# Patient Record
Sex: FEMALE | Race: ASIAN | NOT HISPANIC OR LATINO | URBAN - METROPOLITAN AREA
[De-identification: names, ages, dates, MRNs, and addresses within clinical notes are randomized per-mention and may not be internally consistent; named-entity substitution may affect disease eponyms.]

---

## 2022-11-11 ENCOUNTER — INPATIENT (INPATIENT)
Facility: HOSPITAL | Age: 67
LOS: 5 days | Discharge: HOME | End: 2022-11-17
Attending: INTERNAL MEDICINE | Admitting: INTERNAL MEDICINE

## 2022-11-11 VITALS
HEART RATE: 74 BPM | RESPIRATION RATE: 16 BRPM | OXYGEN SATURATION: 100 % | DIASTOLIC BLOOD PRESSURE: 58 MMHG | SYSTOLIC BLOOD PRESSURE: 166 MMHG

## 2022-11-11 LAB
ALBUMIN SERPL ELPH-MCNC: 4.2 G/DL — SIGNIFICANT CHANGE UP (ref 3.3–5)
ALBUMIN SERPL ELPH-MCNC: 4.5 G/DL — SIGNIFICANT CHANGE UP (ref 3.3–5)
ALP SERPL-CCNC: 76 U/L — SIGNIFICANT CHANGE UP (ref 40–120)
ALP SERPL-CCNC: 79 U/L — SIGNIFICANT CHANGE UP (ref 40–120)
ALT FLD-CCNC: 29 U/L — SIGNIFICANT CHANGE UP (ref 4–33)
ALT FLD-CCNC: 34 U/L — HIGH (ref 4–33)
ANION GAP SERPL CALC-SCNC: 12 MMOL/L — SIGNIFICANT CHANGE UP (ref 7–14)
ANION GAP SERPL CALC-SCNC: 13 MMOL/L — SIGNIFICANT CHANGE UP (ref 7–14)
ANION GAP SERPL CALC-SCNC: 16 MMOL/L — HIGH (ref 7–14)
AST SERPL-CCNC: 28 U/L — SIGNIFICANT CHANGE UP (ref 4–32)
AST SERPL-CCNC: 30 U/L — SIGNIFICANT CHANGE UP (ref 4–32)
BASE EXCESS BLDV CALC-SCNC: -7.6 MMOL/L — LOW (ref -2–3)
BASOPHILS # BLD AUTO: 0.03 K/UL — SIGNIFICANT CHANGE UP (ref 0–0.2)
BASOPHILS NFR BLD AUTO: 0.1 % — SIGNIFICANT CHANGE UP (ref 0–2)
BILIRUB SERPL-MCNC: 0.3 MG/DL — SIGNIFICANT CHANGE UP (ref 0.2–1.2)
BILIRUB SERPL-MCNC: 0.4 MG/DL — SIGNIFICANT CHANGE UP (ref 0.2–1.2)
BLD GP AB SCN SERPL QL: NEGATIVE — SIGNIFICANT CHANGE UP
BLOOD GAS VENOUS COMPREHENSIVE RESULT: SIGNIFICANT CHANGE UP
BUN SERPL-MCNC: 39 MG/DL — HIGH (ref 7–23)
BUN SERPL-MCNC: 39 MG/DL — HIGH (ref 7–23)
BUN SERPL-MCNC: 42 MG/DL — HIGH (ref 7–23)
CALCIUM SERPL-MCNC: 8.3 MG/DL — LOW (ref 8.4–10.5)
CALCIUM SERPL-MCNC: 8.6 MG/DL — SIGNIFICANT CHANGE UP (ref 8.4–10.5)
CALCIUM SERPL-MCNC: 8.8 MG/DL — SIGNIFICANT CHANGE UP (ref 8.4–10.5)
CHLORIDE BLDV-SCNC: 87 MMOL/L — LOW (ref 96–108)
CHLORIDE SERPL-SCNC: 83 MMOL/L — LOW (ref 98–107)
CHLORIDE SERPL-SCNC: 83 MMOL/L — LOW (ref 98–107)
CHLORIDE SERPL-SCNC: 84 MMOL/L — LOW (ref 98–107)
CO2 BLDV-SCNC: 18.3 MMOL/L — LOW (ref 22–26)
CO2 SERPL-SCNC: 15 MMOL/L — LOW (ref 22–31)
CO2 SERPL-SCNC: 16 MMOL/L — LOW (ref 22–31)
CO2 SERPL-SCNC: 17 MMOL/L — LOW (ref 22–31)
CREAT SERPL-MCNC: 1.42 MG/DL — HIGH (ref 0.5–1.3)
CREAT SERPL-MCNC: 1.44 MG/DL — HIGH (ref 0.5–1.3)
CREAT SERPL-MCNC: 1.53 MG/DL — HIGH (ref 0.5–1.3)
EGFR: 37 ML/MIN/1.73M2 — LOW
EGFR: 40 ML/MIN/1.73M2 — LOW
EGFR: 41 ML/MIN/1.73M2 — LOW
EOSINOPHIL # BLD AUTO: 0 K/UL — SIGNIFICANT CHANGE UP (ref 0–0.5)
EOSINOPHIL NFR BLD AUTO: 0 % — SIGNIFICANT CHANGE UP (ref 0–6)
GAS PNL BLDV: 114 MMOL/L — CRITICAL LOW (ref 136–145)
GAS PNL BLDV: SIGNIFICANT CHANGE UP
GLUCOSE BLDV-MCNC: 196 MG/DL — HIGH (ref 70–99)
GLUCOSE SERPL-MCNC: 196 MG/DL — HIGH (ref 70–99)
GLUCOSE SERPL-MCNC: 197 MG/DL — HIGH (ref 70–99)
GLUCOSE SERPL-MCNC: 213 MG/DL — HIGH (ref 70–99)
HCO3 BLDV-SCNC: 17 MMOL/L — LOW (ref 22–29)
HCT VFR BLD CALC: 28.8 % — LOW (ref 34.5–45)
HCT VFR BLDA CALC: 30 % — LOW (ref 34.5–46.5)
HGB BLD CALC-MCNC: 10.1 G/DL — LOW (ref 11.5–15.5)
HGB BLD-MCNC: 9.7 G/DL — LOW (ref 11.5–15.5)
IANC: 20.11 K/UL — HIGH (ref 1.8–7.4)
IMM GRANULOCYTES NFR BLD AUTO: 1.6 % — HIGH (ref 0–0.9)
LACTATE BLDV-MCNC: 3.5 MMOL/L — HIGH (ref 0.5–2)
LYMPHOCYTES # BLD AUTO: 2.05 K/UL — SIGNIFICANT CHANGE UP (ref 1–3.3)
LYMPHOCYTES # BLD AUTO: 8.4 % — LOW (ref 13–44)
MCHC RBC-ENTMCNC: 28 PG — SIGNIFICANT CHANGE UP (ref 27–34)
MCHC RBC-ENTMCNC: 33.7 GM/DL — SIGNIFICANT CHANGE UP (ref 32–36)
MCV RBC AUTO: 83 FL — SIGNIFICANT CHANGE UP (ref 80–100)
MONOCYTES # BLD AUTO: 1.97 K/UL — HIGH (ref 0–0.9)
MONOCYTES NFR BLD AUTO: 8 % — SIGNIFICANT CHANGE UP (ref 2–14)
NEUTROPHILS # BLD AUTO: 20.11 K/UL — HIGH (ref 1.8–7.4)
NEUTROPHILS NFR BLD AUTO: 81.9 % — HIGH (ref 43–77)
NRBC # BLD: 0 /100 WBCS — SIGNIFICANT CHANGE UP (ref 0–0)
NRBC # FLD: 0 K/UL — SIGNIFICANT CHANGE UP (ref 0–0)
OSMOLALITY SERPL: 267 MOSM/KG — LOW (ref 275–295)
PCO2 BLDV: 32 MMHG — LOW (ref 39–42)
PH BLDV: 7.34 — SIGNIFICANT CHANGE UP (ref 7.32–7.43)
PLATELET # BLD AUTO: 225 K/UL — SIGNIFICANT CHANGE UP (ref 150–400)
PO2 BLDV: 40 MMHG — SIGNIFICANT CHANGE UP
POTASSIUM BLDV-SCNC: 6.5 MMOL/L — CRITICAL HIGH (ref 3.5–5.1)
POTASSIUM SERPL-MCNC: 6.4 MMOL/L — CRITICAL HIGH (ref 3.5–5.3)
POTASSIUM SERPL-MCNC: 6.5 MMOL/L — CRITICAL HIGH (ref 3.5–5.3)
POTASSIUM SERPL-MCNC: 7.4 MMOL/L — CRITICAL HIGH (ref 3.5–5.3)
POTASSIUM SERPL-SCNC: 6.4 MMOL/L — CRITICAL HIGH (ref 3.5–5.3)
POTASSIUM SERPL-SCNC: 6.5 MMOL/L — CRITICAL HIGH (ref 3.5–5.3)
POTASSIUM SERPL-SCNC: 7.4 MMOL/L — CRITICAL HIGH (ref 3.5–5.3)
PROT SERPL-MCNC: 7.6 G/DL — SIGNIFICANT CHANGE UP (ref 6–8.3)
PROT SERPL-MCNC: 8 G/DL — SIGNIFICANT CHANGE UP (ref 6–8.3)
RBC # BLD: 3.47 M/UL — LOW (ref 3.8–5.2)
RBC # FLD: 13.2 % — SIGNIFICANT CHANGE UP (ref 10.3–14.5)
RH IG SCN BLD-IMP: POSITIVE — SIGNIFICANT CHANGE UP
SAO2 % BLDV: 66.9 % — SIGNIFICANT CHANGE UP
SODIUM SERPL-SCNC: 112 MMOL/L — CRITICAL LOW (ref 135–145)
SODIUM SERPL-SCNC: 113 MMOL/L — CRITICAL LOW (ref 135–145)
SODIUM SERPL-SCNC: 114 MMOL/L — CRITICAL LOW (ref 135–145)
TROPONIN T, HIGH SENSITIVITY RESULT: 27 NG/L — SIGNIFICANT CHANGE UP
TROPONIN T, HIGH SENSITIVITY RESULT: 30 NG/L — SIGNIFICANT CHANGE UP
WBC # BLD: 24.55 K/UL — HIGH (ref 3.8–10.5)
WBC # FLD AUTO: 24.55 K/UL — HIGH (ref 3.8–10.5)

## 2022-11-11 PROCEDURE — 71045 X-RAY EXAM CHEST 1 VIEW: CPT | Mod: 26

## 2022-11-11 PROCEDURE — 99291 CRITICAL CARE FIRST HOUR: CPT

## 2022-11-11 PROCEDURE — 93010 ELECTROCARDIOGRAM REPORT: CPT

## 2022-11-11 RX ORDER — ALBUTEROL 90 UG/1
10 AEROSOL, METERED ORAL ONCE
Refills: 0 | Status: DISCONTINUED | OUTPATIENT
Start: 2022-11-11 | End: 2022-11-11

## 2022-11-11 RX ORDER — SODIUM ZIRCONIUM CYCLOSILICATE 10 G/10G
10 POWDER, FOR SUSPENSION ORAL ONCE
Refills: 0 | Status: COMPLETED | OUTPATIENT
Start: 2022-11-11 | End: 2022-11-11

## 2022-11-11 RX ORDER — PIPERACILLIN AND TAZOBACTAM 4; .5 G/20ML; G/20ML
3.38 INJECTION, POWDER, LYOPHILIZED, FOR SOLUTION INTRAVENOUS ONCE
Refills: 0 | Status: DISCONTINUED | OUTPATIENT
Start: 2022-11-11 | End: 2022-11-11

## 2022-11-11 RX ORDER — SODIUM CHLORIDE 9 MG/ML
500 INJECTION INTRAMUSCULAR; INTRAVENOUS; SUBCUTANEOUS ONCE
Refills: 0 | Status: DISCONTINUED | OUTPATIENT
Start: 2022-11-11 | End: 2022-11-11

## 2022-11-11 RX ORDER — DEXTROSE 50 % IN WATER 50 %
50 SYRINGE (ML) INTRAVENOUS ONCE
Refills: 0 | Status: COMPLETED | OUTPATIENT
Start: 2022-11-11 | End: 2022-11-11

## 2022-11-11 RX ORDER — CALCIUM GLUCONATE 100 MG/ML
1 VIAL (ML) INTRAVENOUS ONCE
Refills: 0 | Status: DISCONTINUED | OUTPATIENT
Start: 2022-11-11 | End: 2022-11-11

## 2022-11-11 RX ORDER — MECLIZINE HCL 12.5 MG
25 TABLET ORAL ONCE
Refills: 0 | Status: COMPLETED | OUTPATIENT
Start: 2022-11-11 | End: 2022-11-11

## 2022-11-11 RX ORDER — INSULIN HUMAN 100 [IU]/ML
10 INJECTION, SOLUTION SUBCUTANEOUS ONCE
Refills: 0 | Status: COMPLETED | OUTPATIENT
Start: 2022-11-11 | End: 2022-11-11

## 2022-11-11 RX ORDER — PIPERACILLIN AND TAZOBACTAM 4; .5 G/20ML; G/20ML
3.38 INJECTION, POWDER, LYOPHILIZED, FOR SOLUTION INTRAVENOUS ONCE
Refills: 0 | Status: COMPLETED | OUTPATIENT
Start: 2022-11-11 | End: 2022-11-11

## 2022-11-11 RX ORDER — CALCIUM GLUCONATE 100 MG/ML
2 VIAL (ML) INTRAVENOUS ONCE
Refills: 0 | Status: COMPLETED | OUTPATIENT
Start: 2022-11-11 | End: 2022-11-11

## 2022-11-11 RX ORDER — SODIUM CHLORIDE 9 MG/ML
500 INJECTION, SOLUTION INTRAVENOUS ONCE
Refills: 0 | Status: COMPLETED | OUTPATIENT
Start: 2022-11-11 | End: 2022-11-11

## 2022-11-11 RX ORDER — VANCOMYCIN HCL 1 G
1000 VIAL (EA) INTRAVENOUS ONCE
Refills: 0 | Status: COMPLETED | OUTPATIENT
Start: 2022-11-11 | End: 2022-11-11

## 2022-11-11 RX ORDER — PIPERACILLIN AND TAZOBACTAM 4; .5 G/20ML; G/20ML
3.38 INJECTION, POWDER, LYOPHILIZED, FOR SOLUTION INTRAVENOUS ONCE
Refills: 0 | Status: DISCONTINUED | OUTPATIENT
Start: 2022-11-12 | End: 2022-11-12

## 2022-11-11 RX ORDER — SODIUM CHLORIDE 5 G/100ML
150 INJECTION, SOLUTION INTRAVENOUS
Refills: 0 | Status: DISCONTINUED | OUTPATIENT
Start: 2022-11-11 | End: 2022-11-12

## 2022-11-11 RX ORDER — FUROSEMIDE 40 MG
20 TABLET ORAL ONCE
Refills: 0 | Status: DISCONTINUED | OUTPATIENT
Start: 2022-11-11 | End: 2022-11-11

## 2022-11-11 RX ADMIN — Medication 25 MILLIGRAM(S): at 20:47

## 2022-11-11 RX ADMIN — INSULIN HUMAN 10 UNIT(S): 100 INJECTION, SOLUTION SUBCUTANEOUS at 22:35

## 2022-11-11 RX ADMIN — SODIUM ZIRCONIUM CYCLOSILICATE 10 GRAM(S): 10 POWDER, FOR SUSPENSION ORAL at 22:39

## 2022-11-11 RX ADMIN — Medication 200 GRAM(S): at 22:39

## 2022-11-11 RX ADMIN — PIPERACILLIN AND TAZOBACTAM 200 GRAM(S): 4; .5 INJECTION, POWDER, LYOPHILIZED, FOR SOLUTION INTRAVENOUS at 23:13

## 2022-11-11 RX ADMIN — Medication 50 MILLILITER(S): at 22:39

## 2022-11-11 RX ADMIN — SODIUM CHLORIDE 500 MILLILITER(S): 9 INJECTION, SOLUTION INTRAVENOUS at 20:47

## 2022-11-11 RX ADMIN — SODIUM CHLORIDE 450 MILLILITER(S): 5 INJECTION, SOLUTION INTRAVENOUS at 23:06

## 2022-11-11 NOTE — H&P ADULT - ASSESSMENT
ASSESSMENT  67y female PMHx HTN, HLD, DM2, iron deficiency anemia (s/p iron transfusion yesterday) presenting with generalized weakness, dizziness and lower extremity heaviness admitted to the MICU for electrolyte disturbances (Na 112, K 7.4, Cl 83).     PLAN  =====Neurologic=====  Patient is AOx3 at baseline    #Seizure?  Unsure if seizure activity, patient had witnessed arm and eyelid twitching with unresponsiveness for 1-2 minutes. Potentially 2/2 to electrolyte abnormalities  - No EEG needed at this time  - Neuro checks q4h  - Correct electrolytes    =====Pulmonary=====  Patient breathing comfortably on room air. No active issues. Reported SOB resolved, will monitor.     =====Cardiovascular=====  Patient does not currently require vasopressors and is normotensive. No active issues    =====GI=====  #GI Prophylaxis  - Protonix 40mg IV QD    #Diet  - Advance as tolerated    #Diarrhea  Largely resolved  - Monitor  - Consider stool studies if worsening of diarrhea    =====Renal/=====  #Electrolytes  - Maintain K>4, Phos>3, Mag>2, iCal>1    #Hyponatremia (Na 112, 11/12)  Likely 2/2 decreased PO intact, volume loss from diarrhea and worsening SIADH?  - Hypertonic saline (150 mL) given in ED   - Do not correct Na too fast  - BMP q4h  - Lao for urine output, strict monitor I/Os   - Nephrology consulted, pending recs  - Will sent TSH, cortisol  - Will send urine osmolality and urine lytes    #Hyperkalemia (K 7.4 11/12)  Likely contributed by MAGUE and CKD. Possible side effect of new med started 9/2022 Kerendia. No EKG changes.   - In ED: Insulin, calcium gluconate, albuterol and furosemide given (K improved to 6.4)  - Nephrology consulted, pending recs  - In AM, obtain patient records for baseline kidney function from PCP  - Hold Kerendia and losartan   - BMP q4h     #MAGUE/CKD  Presented with BUN 42 and Cr 1.53, BUN/Cr ratio 27. Likely 2/2 decreased PO intake and fluid loss from diarrhea.  - Will obtain records in AM for baseline BUN and Cr    =====Endocrine=====  #DM2  - While NPO, FSBG and KARISHMA q6h  - Will obtain A1C    #Hypothyroidism  - C/w current dose of synthroid  - Will obtain TSH and T4      =====Infectious Disease=====  Patient is afebrile. Patient has leukocytosis, unlikely from infectious etiology. Likely 2/2 inflammation and recent diarrhea.     =====Heme/Onc=====  #DVT Prophylaxis  - Heparin 5000 subq     #Iron deficiency anemia  - Obtain outpatient records in AM from PCP    =====Ethics=====  FULL CODE  Speaks Vietnamese

## 2022-11-11 NOTE — ED ADULT NURSE NOTE - SUICIDE SCREENING QUESTION 3
Psychotherapy Provided: Group Therapy     Length of time in session: 2 hrs minutes, follow up in 1 week    Goals addressed in session: Goal 1     Pain:      none    0    Current suicide risk : Low     Goal 1, Week 2  D:  Enedina Corral  attended anger management group  Topics were:  reviewed homework assignment, what do I do when I get angry, understanding anger:  how I get angry and the anger cycle and the 3 Rs   A: Mild progress on goal   The group appears to be forming as a group  P:  Homework:  to apply coping skills learned in group today  Behavioral Health Treatment Plan [facility]: Diagnosis and Treatment Plan explained to Jazz Danitza relates understanding diagnosis and is agreeable to Treatment Plan   Yes
No

## 2022-11-11 NOTE — H&P ADULT - NSHPREVIEWOFSYSTEMS_GEN_ALL_CORE
CONSTITUTIONAL: No fever, weight loss. + FATIGUE   EYES: No eye pain, visual disturbances, or discharge  ENMT:  No difficulty hearing, tinnitus, vertigo; No sinus or throat pain. +DIZZINESS  RESPIRATORY: No wheezing, chills or hemoptysis; + COUGH and SOB   CARDIOVASCULAR: No chest pain, palpitations, or leg swelling  GASTROINTESTINAL: No epigastric pain. No vomiting, or hematemesis; No constipation. No melena or hematochezia. + NAUSEA, ABDOMINAL CRAMPS, DIARRHEA, STOOL INCONTINENCE  GENITOURINARY: No dysuria, frequency, hematuria, or urinary incontinence  NEUROLOGICAL: No headaches, loss of strength or tremors + DIZZINESS, NUMBNESS AND TINGLING IN B/L LE  SKIN: No itching, burning, rashes, or lesions   LYMPH NODES: No enlarged glands  ENDOCRINE: No heat or cold intolerance; No polydipsia or polyuria  MUSCULOSKELETAL: No swelling; + LEG CRAMPING AND HEAVINESS  PSYCHIATRIC: Denies depression, anxiety  HEME/LYMPH: No easy bruising, or bleeding gums + ANEMIA  ALLERGY AND IMMUNOLOGIC: No hives or eczema

## 2022-11-11 NOTE — ED PROVIDER NOTE - NS ED ROS FT
CONST: no fevers, no chills, no trauma  EYES: no pain, no visual disturbances  ENT: no sore throat, no epistaxis, no rhinorrhea, no hearing changes  CV: no chest pain, no palpitations, no orthopnea, no extremity pain or swelling  RESP: no shortness of breath, no cough, no sputum, no pleurisy, no wheezing  ABD: no abdominal pain, no nausea, no vomiting, no diarrhea, no black or bloody stool  : no dysuria, no hematuria, no frequency, no urgency  MSK: no back pain, no neck pain, + extremity pain  NEURO: no headache, no sensory disturbances, no focal weakness, + dizziness  HEME: no easy bleeding or bruising  SKIN: no diaphoresis, no rash

## 2022-11-11 NOTE — H&P ADULT - NSHPPHYSICALEXAM_GEN_ALL_CORE
T(C): 36.7 (11-11-22 @ 23:00), Max: 36.7 (11-11-22 @ 23:00)  HR: 78 (11-11-22 @ 23:00) (74 - 78)  BP: 171/67 (11-11-22 @ 23:00) (166/58 - 171/67)  RR: 20 (11-11-22 @ 23:00) (16 - 20)  SpO2: 100% (11-11-22 @ 23:00) (100% - 100%)    CONSTITUTIONAL: Well groomed, no apparent distress  EYES: PERRLA and symmetric, EOMI, No conjunctival or scleral injection, non-icteric  ENMT: Dry oral mucosa with moist membranes. Normal dentition; no pharyngeal injection or exudates             NECK: Supple, symmetric and without tracheal deviation   RESP: No respiratory distress, no use of accessory muscles; CTA b/l, no WRR  CV: RRR, +S1S2, no MRG; no JVD; no peripheral edema  GI: Soft, NT, ND, no rebound, no guarding; no palpable masses;   MSK: ; No digital clubbing or cyanosis; examination of the (head/neck/spine/ribs/pelvis, RUE, LUE, RLE, LLE) without misalignment, unable to assess gait due to patient dizziness.            Normal ROM without pain, no spinal tenderness, normal muscle strength/tone  SKIN: No rashes or ulcers noted  NEURO: AOx3, sensation intact in upper and lower extremities  PSYCH: Appropriate insight/judgment; A+O x 3, mood and affect appropriate, recent/remote memory intact

## 2022-11-11 NOTE — H&P ADULT - NSHPLABSRESULTS_GEN_ALL_CORE
CBC:             9.7    24.55 )-----------( 225      (  @ 20:30 )             28.8     Amarilis %: 81.9  / Lym %: 8.4   / Eos %: 0.0   / Band %: 1.6      WBC 72h Trend: 24.55 ()  ---------------------------------------------------------------------------  Hemoglobin 24h Trend:  9.7 (20:30)  ---------------------------------------------------------------------------  CMP:  114<LL>  |  83<L>  |  39<H>  ----------------------------( 197<H>     11 @ 22:31  6.4<HH>   |  15<L>  |  1.44<H>    Ca: 8.6   Phos: 3.4   M.70    TPro: 7.6 / Alb: 4.2 /  TBili 0.3 / DBili x  /  AST 28 /  ALT 29 /  AlkPhos  76  ---------------------------------------------------------------------------  Creatinine 48h Trend:  1.44 (), 1.53 ()  ---------------------------------------------------------------------------  Coags:    ---------------------------------------------------------------------------  ABG:    ---------------------------------------------------------------------------  UA:    ---------------------------------------------------------------------------  CSF:    ---------------------------------------------------------------------------  MICROBIOLOGY:    ---------------------------------------------------------------------------  IMAGING/OTHER TESTING:  Labs, imaging, EKG personally reviewed

## 2022-11-11 NOTE — ED PROVIDER NOTE - OBJECTIVE STATEMENT
66 yo F hx HTN, HLD, DM2, iron deficiency anemia (s/p iron transfusion yesterday), pw c/o intermittent dizziness described as room spinning, and sensation of heaviness and cramping to b/l LE. Denies cp/sob. No abdominal pain, no nv, or urinary sx. Endorses intermittent diarrhea (chronic), no diarrhea today.

## 2022-11-11 NOTE — ED ADULT NURSE NOTE - OBJECTIVE STATEMENT
No note done prior report received from MD Wall. Pt received to room 19. A&Ox4, amb at baseline. Pmh of HTN, HLD, iron deficiency anemia with recent iron transfusion given. Pt brought in with family members at bedside c/o room spinning sensation and dizziness beginning today. Per pt's family members, pt has experienced this in the past, but symptoms have subsided. Pt not officially diagnosed with vertigo. Pt also c/o bilateral lower extremity weakness and heaviness. Pt ambulatory at baseline, but per family pt had experienced a seizure around 4pm. Pt also c/o chronic intermittent diarrhea nonbloody as well, MD made aware. Pt remembered episode and awake and alert during episode. Pt vitally stable, noted to have elevated BP, NSR on the monitor, 100% room air. Pt denies chest pain, SOB, n/v, numbness/tingling to hands/feet. Resp even unlabored, abd soft nontender, pedal pulses 2+ bilaterally. Bilateral 20G placed, labs sent, awaiting orders, pt medicated for electrolytes

## 2022-11-11 NOTE — ED PROVIDER NOTE - CLINICAL SUMMARY MEDICAL DECISION MAKING FREE TEXT BOX
68 yo F presenting with dizziness- r/o atypical ACS vs metabolic disturbance vs vertigo. Pt describes sx as room spinning typical of her vertiginous sx in past. Plan to tx with meclizine, 500 cc fluid bolus, reassess

## 2022-11-11 NOTE — ED PROVIDER NOTE - PHYSICAL EXAMINATION
VITALS: reviewed  GEN: NAD, A & O x 4  HEAD/EYES: NCAT, EOMI, anicteric sclerae, no nystagmus  ENT: mucus membranes moist, oropharynx WNL, trachea midline,  RESP: lungs CTA with equal breath sounds bilaterally, chest wall nontender and atraumatic  CV: heart with reg rhythm S1, S2, distal pulses intact and symmetric bilaterally  ABDOMEN: normoactive bowel sounds, soft, nondistended, nontender, no palpable masses  : no CVAT  MSK: extremities atraumatic and nontender, no edema, no asymmetry.  SKIN: warm, dry, no rash, no bruising, no cyanosis. color appropriate for ethnicity  NEURO: alert, mentating appropriately, no facial asymmetry. Strength and sensation intact, gait steady  PSYCH: Affect appropriate

## 2022-11-11 NOTE — H&P ADULT - HISTORY OF PRESENT ILLNESS
66 yo F hx HTN, HLD, DM2, iron deficiency anemia (s/p iron transfusion yesterday), pw c/o intermittent dizziness described as room spinning, and sensation of heaviness and cramping to bilateral lower extremities. Patient states leg heaviness has been going on for a few months. Patient states for 2 weeks developed diarrhea, 3-4 watery BMs per day. Diarrhea has mostly resolved in the last 2 days. She starting antidiarrhea and antispasmodic. Patient has been having worsening dizziness and weakness for the last 2 weeks. Patient states leg cramps have been getting worse for the last 5 days with intermittent numbness and tingling b/l. Earlier today, patient was eating and went unresponsive with associated arm and eye twitching for 1-2 minutes according to family. Patient was stool incontinent during episode but has hx of stool incontinence at times. Patient also complaining of intermittent SOB this morning that has since resolved. Denies history of seizures or seizure disorders. Patient endorses history of hyponatremia in past after diarrhea. Of note, patient also started on Kerendia in September for CKD, baseline renal function unknown. In addition, patient received iron transfusion yesterday for iron deficiency anemia diagnosed a few months ago, unknown etiology or baseline.     ([facility]  [id number])

## 2022-11-11 NOTE — H&P ADULT - NSHPSOCIALHISTORY_GEN_ALL_CORE
Lives with 2 daughters, has 3 children total. Primary contact is daughter Cecilia [phone]. Secondary contact [facility] [phone].

## 2022-11-11 NOTE — H&P ADULT - ATTENDING COMMENTS
Patient is a 68 yo F w/ HTN, HLD, T2DM, ZIGGY (s/p iron infusion 11/10) who p/w complaints of intermittent dizziness for several months as well as LE heaviness/cramps and diarrhea for the past 2 weeks. As per patient and daughters at bedside, endorses 3 - 4 watery BMs per day for the last 2 to 3 weeks, improved over the last 2 days accompanied with decreased PO intake ever since eating [employer] about 3 to 4 weeks ago. Family brought patient to ED after witness episode of unresponsiveness with body twitching for several minutes with noted fecal incontinence. Endorses prior episode of Hyponatremia in setting of diarrhea in 2015, requiring hospital admission. Denies any recent medication changes but noted to have been started on Finerenone 8 weeks PTA. Denies any fevers, chills, sick contacts.     Labs notable for leukocytosis to WBC 24, Na 112, K 7.4, Bicarb 16, Creatinine 1.5 (unknown baseline), Trop 30. CXR clear lungs    #Severe Hyponatremia - Na 112 on admission with possible seizure PTA at 11/11 4PM at home. Likely multifactorial in setting of recent diarrhea, decreased PO intake, SIADH and recently started Finerenone in setting of MAGUE on CKD. Received LR 500cc bolus in ED and 3% 150cc bolus.  - Lao catheter placement for strict i/os with hourly UOP  - Trend serum osm, BMP, urine osm/lytes q4h for now  - Na correction with IVF as needed based on labs  - Fluid restriction  - Hold ARB and Finerenone  - Check TSH, Am cortisol    #Hyperkalemia - Likely 2/2 MAGUE on CKD and Finerenone use. No EKG changes  - Hold ARB and Finerenone  - Medical management  - Monitor K    #MAGUE on CKD   - Obtain collateral  - Monitor BMP    #Leukocytosis - Likely stress reaction in setting of possible seizure. No signs or symptoms of infection currently  - Monitor    #Diarrhea - Reportedly improved  - Stool studies if continues    #POCUS - Not in shock state    #GOC - Full code    #DVT ppx - HSQ    Chidi Mora MD  Pulmonary & Critical Care

## 2022-11-11 NOTE — ED PROVIDER NOTE - CRITICAL CARE ATTENDING CONTRIBUTION TO CARE
66 yo F presenting with hyperkalemia and hyponatremia, requiring calcium, cardiac monitor, insulin/dextrose, albuterol, furosemide. Plan for seum osm/urine osm, nephroloy c/s, CT abd/pelv (leukocytosis 23), tba 68 yo F presenting with hyperkalemia and hyponatremia, requiring calcium, cardiac monitor, insulin/dextrose, albuterol,Plan for seum osm/urine osm, nephroloy c/s, CT abd/pelv (leukocytosis 23), tba

## 2022-11-11 NOTE — ED PROVIDER NOTE - PROGRESS NOTE DETAILS
[facility]: hyperkalemic to 7.5-- wrote for calcium, hyperkalemia cocktail, cardiac monitor, consulted nephrology, signed out to main ED-- Dr. Bolivar. MD CHO:  I received s/o on this pt from Dr. Wall.  Pt with seizure in context of diarrhea, na 112.  Non-hemolyzed hyper-k.  Pt remains drowsy.  I spoke with daughters who are aware of critical nature of pt's condition.  I spoke with MICU attg Dr. Thang Mora, he will see the pt, agrees with plan for hypertonic.

## 2022-11-12 DIAGNOSIS — N18.30 CHRONIC KIDNEY DISEASE, STAGE 3 UNSPECIFIED: ICD-10-CM

## 2022-11-12 DIAGNOSIS — N17.9 ACUTE KIDNEY FAILURE, UNSPECIFIED: ICD-10-CM

## 2022-11-12 DIAGNOSIS — E87.1 HYPO-OSMOLALITY AND HYPONATREMIA: ICD-10-CM

## 2022-11-12 DIAGNOSIS — R56.9 UNSPECIFIED CONVULSIONS: ICD-10-CM

## 2022-11-12 DIAGNOSIS — E87.5 HYPERKALEMIA: ICD-10-CM

## 2022-11-12 LAB
A1C WITH ESTIMATED AVERAGE GLUCOSE RESULT: 7.9 % — HIGH (ref 4–5.6)
ANION GAP SERPL CALC-SCNC: 13 MMOL/L — SIGNIFICANT CHANGE UP (ref 7–14)
ANION GAP SERPL CALC-SCNC: 14 MMOL/L — SIGNIFICANT CHANGE UP (ref 7–14)
ANION GAP SERPL CALC-SCNC: 14 MMOL/L — SIGNIFICANT CHANGE UP (ref 7–14)
ANION GAP SERPL CALC-SCNC: 15 MMOL/L — HIGH (ref 7–14)
ANION GAP SERPL CALC-SCNC: 16 MMOL/L — HIGH (ref 7–14)
ANION GAP SERPL CALC-SCNC: 17 MMOL/L — HIGH (ref 7–14)
APPEARANCE UR: CLEAR — SIGNIFICANT CHANGE UP
B PERT DNA SPEC QL NAA+PROBE: SIGNIFICANT CHANGE UP
B PERT+PARAPERT DNA PNL SPEC NAA+PROBE: SIGNIFICANT CHANGE UP
BACTERIA # UR AUTO: NEGATIVE — SIGNIFICANT CHANGE UP
BASE EXCESS BLDV CALC-SCNC: -6.5 MMOL/L — LOW (ref -2–3)
BASOPHILS # BLD AUTO: 0.01 K/UL — SIGNIFICANT CHANGE UP (ref 0–0.2)
BASOPHILS NFR BLD AUTO: 0.1 % — SIGNIFICANT CHANGE UP (ref 0–2)
BILIRUB UR-MCNC: NEGATIVE — SIGNIFICANT CHANGE UP
BLD GP AB SCN SERPL QL: NEGATIVE — SIGNIFICANT CHANGE UP
BLOOD GAS VENOUS COMPREHENSIVE RESULT: SIGNIFICANT CHANGE UP
BORDETELLA PARAPERTUSSIS (RAPRVP): SIGNIFICANT CHANGE UP
BUN SERPL-MCNC: 23 MG/DL — SIGNIFICANT CHANGE UP (ref 7–23)
BUN SERPL-MCNC: 24 MG/DL — HIGH (ref 7–23)
BUN SERPL-MCNC: 28 MG/DL — HIGH (ref 7–23)
BUN SERPL-MCNC: 29 MG/DL — HIGH (ref 7–23)
BUN SERPL-MCNC: 31 MG/DL — HIGH (ref 7–23)
BUN SERPL-MCNC: 34 MG/DL — HIGH (ref 7–23)
C PNEUM DNA SPEC QL NAA+PROBE: SIGNIFICANT CHANGE UP
CALCIUM SERPL-MCNC: 8.3 MG/DL — LOW (ref 8.4–10.5)
CALCIUM SERPL-MCNC: 8.4 MG/DL — SIGNIFICANT CHANGE UP (ref 8.4–10.5)
CALCIUM SERPL-MCNC: 8.5 MG/DL — SIGNIFICANT CHANGE UP (ref 8.4–10.5)
CALCIUM SERPL-MCNC: 8.8 MG/DL — SIGNIFICANT CHANGE UP (ref 8.4–10.5)
CALCIUM SERPL-MCNC: 9.3 MG/DL — SIGNIFICANT CHANGE UP (ref 8.4–10.5)
CALCIUM SERPL-MCNC: 9.8 MG/DL — SIGNIFICANT CHANGE UP (ref 8.4–10.5)
CHLORIDE BLDV-SCNC: 90 MMOL/L — LOW (ref 96–108)
CHLORIDE SERPL-SCNC: 83 MMOL/L — LOW (ref 98–107)
CHLORIDE SERPL-SCNC: 84 MMOL/L — LOW (ref 98–107)
CHLORIDE SERPL-SCNC: 85 MMOL/L — LOW (ref 98–107)
CHLORIDE SERPL-SCNC: 85 MMOL/L — LOW (ref 98–107)
CHLORIDE SERPL-SCNC: 87 MMOL/L — LOW (ref 98–107)
CHLORIDE SERPL-SCNC: 87 MMOL/L — LOW (ref 98–107)
CHLORIDE UR-SCNC: 56 MMOL/L — SIGNIFICANT CHANGE UP
CK SERPL-CCNC: 296 U/L — HIGH (ref 25–170)
CO2 BLDV-SCNC: 18.9 MMOL/L — LOW (ref 22–26)
CO2 SERPL-SCNC: 16 MMOL/L — LOW (ref 22–31)
CO2 SERPL-SCNC: 17 MMOL/L — LOW (ref 22–31)
CO2 SERPL-SCNC: 17 MMOL/L — LOW (ref 22–31)
CO2 SERPL-SCNC: 18 MMOL/L — LOW (ref 22–31)
CO2 SERPL-SCNC: 19 MMOL/L — LOW (ref 22–31)
CO2 SERPL-SCNC: 20 MMOL/L — LOW (ref 22–31)
COLOR SPEC: COLORLESS — SIGNIFICANT CHANGE UP
CREAT ?TM UR-MCNC: 11 MG/DL — SIGNIFICANT CHANGE UP
CREAT SERPL-MCNC: 1.07 MG/DL — SIGNIFICANT CHANGE UP (ref 0.5–1.3)
CREAT SERPL-MCNC: 1.12 MG/DL — SIGNIFICANT CHANGE UP (ref 0.5–1.3)
CREAT SERPL-MCNC: 1.2 MG/DL — SIGNIFICANT CHANGE UP (ref 0.5–1.3)
CREAT SERPL-MCNC: 1.31 MG/DL — HIGH (ref 0.5–1.3)
CREAT SERPL-MCNC: 1.43 MG/DL — HIGH (ref 0.5–1.3)
CREAT SERPL-MCNC: 1.45 MG/DL — HIGH (ref 0.5–1.3)
DIFF PNL FLD: ABNORMAL
EGFR: 40 ML/MIN/1.73M2 — LOW
EGFR: 40 ML/MIN/1.73M2 — LOW
EGFR: 45 ML/MIN/1.73M2 — LOW
EGFR: 50 ML/MIN/1.73M2 — LOW
EGFR: 54 ML/MIN/1.73M2 — LOW
EGFR: 57 ML/MIN/1.73M2 — LOW
EOSINOPHIL # BLD AUTO: 0 K/UL — SIGNIFICANT CHANGE UP (ref 0–0.5)
EOSINOPHIL NFR BLD AUTO: 0 % — SIGNIFICANT CHANGE UP (ref 0–6)
EPI CELLS # UR: 1 /HPF — SIGNIFICANT CHANGE UP (ref 0–5)
ESTIMATED AVERAGE GLUCOSE: 180 — SIGNIFICANT CHANGE UP
FLUAV SUBTYP SPEC NAA+PROBE: SIGNIFICANT CHANGE UP
FLUBV RNA SPEC QL NAA+PROBE: SIGNIFICANT CHANGE UP
GAS PNL BLDV: 117 MMOL/L — CRITICAL LOW (ref 136–145)
GLUCOSE BLDC GLUCOMTR-MCNC: 145 MG/DL — HIGH (ref 70–99)
GLUCOSE BLDC GLUCOMTR-MCNC: 149 MG/DL — HIGH (ref 70–99)
GLUCOSE BLDC GLUCOMTR-MCNC: 204 MG/DL — HIGH (ref 70–99)
GLUCOSE BLDC GLUCOMTR-MCNC: 222 MG/DL — HIGH (ref 70–99)
GLUCOSE BLDV-MCNC: 248 MG/DL — HIGH (ref 70–99)
GLUCOSE SERPL-MCNC: 137 MG/DL — HIGH (ref 70–99)
GLUCOSE SERPL-MCNC: 197 MG/DL — HIGH (ref 70–99)
GLUCOSE SERPL-MCNC: 226 MG/DL — HIGH (ref 70–99)
GLUCOSE SERPL-MCNC: 231 MG/DL — HIGH (ref 70–99)
GLUCOSE SERPL-MCNC: 232 MG/DL — HIGH (ref 70–99)
GLUCOSE SERPL-MCNC: 254 MG/DL — HIGH (ref 70–99)
GLUCOSE UR QL: NEGATIVE — SIGNIFICANT CHANGE UP
HADV DNA SPEC QL NAA+PROBE: SIGNIFICANT CHANGE UP
HCO3 BLDV-SCNC: 18 MMOL/L — LOW (ref 22–29)
HCOV 229E RNA SPEC QL NAA+PROBE: SIGNIFICANT CHANGE UP
HCOV HKU1 RNA SPEC QL NAA+PROBE: SIGNIFICANT CHANGE UP
HCOV NL63 RNA SPEC QL NAA+PROBE: SIGNIFICANT CHANGE UP
HCOV OC43 RNA SPEC QL NAA+PROBE: SIGNIFICANT CHANGE UP
HCT VFR BLD CALC: 28 % — LOW (ref 34.5–45)
HCT VFR BLDA CALC: 30 % — LOW (ref 34.5–46.5)
HCV AB S/CO SERPL IA: 0.06 S/CO — SIGNIFICANT CHANGE UP (ref 0–0.99)
HCV AB SERPL-IMP: SIGNIFICANT CHANGE UP
HGB BLD CALC-MCNC: 9.9 G/DL — LOW (ref 11.5–15.5)
HGB BLD-MCNC: 9.7 G/DL — LOW (ref 11.5–15.5)
HMPV RNA SPEC QL NAA+PROBE: SIGNIFICANT CHANGE UP
HPIV1 RNA SPEC QL NAA+PROBE: SIGNIFICANT CHANGE UP
HPIV2 RNA SPEC QL NAA+PROBE: SIGNIFICANT CHANGE UP
HPIV3 RNA SPEC QL NAA+PROBE: SIGNIFICANT CHANGE UP
HPIV4 RNA SPEC QL NAA+PROBE: SIGNIFICANT CHANGE UP
IANC: 14.22 K/UL — HIGH (ref 1.8–7.4)
IMM GRANULOCYTES NFR BLD AUTO: 1.3 % — HIGH (ref 0–0.9)
KETONES UR-MCNC: NEGATIVE — SIGNIFICANT CHANGE UP
LACTATE BLDV-MCNC: 3.7 MMOL/L — HIGH (ref 0.5–2)
LEUKOCYTE ESTERASE UR-ACNC: NEGATIVE — SIGNIFICANT CHANGE UP
LYMPHOCYTES # BLD AUTO: 1.9 K/UL — SIGNIFICANT CHANGE UP (ref 1–3.3)
LYMPHOCYTES # BLD AUTO: 10.5 % — LOW (ref 13–44)
M PNEUMO DNA SPEC QL NAA+PROBE: SIGNIFICANT CHANGE UP
MAGNESIUM SERPL-MCNC: 1.4 MG/DL — LOW (ref 1.6–2.6)
MAGNESIUM SERPL-MCNC: 1.7 MG/DL — SIGNIFICANT CHANGE UP (ref 1.6–2.6)
MAGNESIUM SERPL-MCNC: 1.7 MG/DL — SIGNIFICANT CHANGE UP (ref 1.6–2.6)
MAGNESIUM SERPL-MCNC: 1.9 MG/DL — SIGNIFICANT CHANGE UP (ref 1.6–2.6)
MAGNESIUM SERPL-MCNC: 1.9 MG/DL — SIGNIFICANT CHANGE UP (ref 1.6–2.6)
MAGNESIUM SERPL-MCNC: 2.1 MG/DL — SIGNIFICANT CHANGE UP (ref 1.6–2.6)
MCHC RBC-ENTMCNC: 28.1 PG — SIGNIFICANT CHANGE UP (ref 27–34)
MCHC RBC-ENTMCNC: 34.6 GM/DL — SIGNIFICANT CHANGE UP (ref 32–36)
MCV RBC AUTO: 81.2 FL — SIGNIFICANT CHANGE UP (ref 80–100)
MONOCYTES # BLD AUTO: 1.66 K/UL — HIGH (ref 0–0.9)
MONOCYTES NFR BLD AUTO: 9.2 % — SIGNIFICANT CHANGE UP (ref 2–14)
NEUTROPHILS # BLD AUTO: 14.22 K/UL — HIGH (ref 1.8–7.4)
NEUTROPHILS NFR BLD AUTO: 78.9 % — HIGH (ref 43–77)
NITRITE UR-MCNC: NEGATIVE — SIGNIFICANT CHANGE UP
NRBC # BLD: 0 /100 WBCS — SIGNIFICANT CHANGE UP (ref 0–0)
NRBC # FLD: 0 K/UL — SIGNIFICANT CHANGE UP (ref 0–0)
NT-PROBNP SERPL-SCNC: 1324 PG/ML — HIGH
OSMOLALITY SERPL: 257 MOSM/KG — LOW (ref 275–295)
OSMOLALITY SERPL: 263 MOSM/KG — LOW (ref 275–295)
OSMOLALITY SERPL: 271 MOSM/KG — LOW (ref 275–295)
OSMOLALITY SERPL: 272 MOSM/KG — LOW (ref 275–295)
OSMOLALITY UR: 209 MOSM/KG — SIGNIFICANT CHANGE UP (ref 50–1200)
OSMOLALITY UR: 396 MOSM/KG — SIGNIFICANT CHANGE UP (ref 50–1200)
OSMOLALITY UR: 543 MOSM/KG — SIGNIFICANT CHANGE UP (ref 50–1200)
OSMOLALITY UR: 544 MOSM/KG — SIGNIFICANT CHANGE UP (ref 50–1200)
PCO2 BLDV: 31 MMHG — LOW (ref 39–42)
PH BLDV: 7.37 — SIGNIFICANT CHANGE UP (ref 7.32–7.43)
PH UR: 6.5 — SIGNIFICANT CHANGE UP (ref 5–8)
PHOSPHATE SERPL-MCNC: 4.2 MG/DL — SIGNIFICANT CHANGE UP (ref 2.5–4.5)
PHOSPHATE SERPL-MCNC: 4.6 MG/DL — HIGH (ref 2.5–4.5)
PHOSPHATE SERPL-MCNC: 4.6 MG/DL — HIGH (ref 2.5–4.5)
PHOSPHATE SERPL-MCNC: 4.8 MG/DL — HIGH (ref 2.5–4.5)
PHOSPHATE SERPL-MCNC: 4.9 MG/DL — HIGH (ref 2.5–4.5)
PHOSPHATE SERPL-MCNC: 5 MG/DL — HIGH (ref 2.5–4.5)
PLATELET # BLD AUTO: 189 K/UL — SIGNIFICANT CHANGE UP (ref 150–400)
PO2 BLDV: 72 MMHG — SIGNIFICANT CHANGE UP
POTASSIUM BLDV-SCNC: 5.7 MMOL/L — HIGH (ref 3.5–5.1)
POTASSIUM SERPL-MCNC: 4.4 MMOL/L — SIGNIFICANT CHANGE UP (ref 3.5–5.3)
POTASSIUM SERPL-MCNC: 4.6 MMOL/L — SIGNIFICANT CHANGE UP (ref 3.5–5.3)
POTASSIUM SERPL-MCNC: 4.7 MMOL/L — SIGNIFICANT CHANGE UP (ref 3.5–5.3)
POTASSIUM SERPL-MCNC: 4.8 MMOL/L — SIGNIFICANT CHANGE UP (ref 3.5–5.3)
POTASSIUM SERPL-MCNC: 5.2 MMOL/L — SIGNIFICANT CHANGE UP (ref 3.5–5.3)
POTASSIUM SERPL-MCNC: 5.4 MMOL/L — HIGH (ref 3.5–5.3)
POTASSIUM SERPL-SCNC: 4.4 MMOL/L — SIGNIFICANT CHANGE UP (ref 3.5–5.3)
POTASSIUM SERPL-SCNC: 4.6 MMOL/L — SIGNIFICANT CHANGE UP (ref 3.5–5.3)
POTASSIUM SERPL-SCNC: 4.7 MMOL/L — SIGNIFICANT CHANGE UP (ref 3.5–5.3)
POTASSIUM SERPL-SCNC: 4.8 MMOL/L — SIGNIFICANT CHANGE UP (ref 3.5–5.3)
POTASSIUM SERPL-SCNC: 5.2 MMOL/L — SIGNIFICANT CHANGE UP (ref 3.5–5.3)
POTASSIUM SERPL-SCNC: 5.4 MMOL/L — HIGH (ref 3.5–5.3)
POTASSIUM UR-SCNC: 8.8 MMOL/L — SIGNIFICANT CHANGE UP
PROT UR-MCNC: NEGATIVE — SIGNIFICANT CHANGE UP
RAPID RVP RESULT: SIGNIFICANT CHANGE UP
RBC # BLD: 3.45 M/UL — LOW (ref 3.8–5.2)
RBC # FLD: 13.3 % — SIGNIFICANT CHANGE UP (ref 10.3–14.5)
RBC CASTS # UR COMP ASSIST: 16 /HPF — HIGH (ref 0–4)
RH IG SCN BLD-IMP: POSITIVE — SIGNIFICANT CHANGE UP
RSV RNA SPEC QL NAA+PROBE: SIGNIFICANT CHANGE UP
RV+EV RNA SPEC QL NAA+PROBE: SIGNIFICANT CHANGE UP
SAO2 % BLDV: 95.4 % — SIGNIFICANT CHANGE UP
SARS-COV-2 RNA SPEC QL NAA+PROBE: SIGNIFICANT CHANGE UP
SODIUM SERPL-SCNC: 116 MMOL/L — CRITICAL LOW (ref 135–145)
SODIUM SERPL-SCNC: 117 MMOL/L — CRITICAL LOW (ref 135–145)
SODIUM SERPL-SCNC: 118 MMOL/L — CRITICAL LOW (ref 135–145)
SODIUM SERPL-SCNC: 118 MMOL/L — CRITICAL LOW (ref 135–145)
SODIUM SERPL-SCNC: 119 MMOL/L — CRITICAL LOW (ref 135–145)
SODIUM SERPL-SCNC: 119 MMOL/L — CRITICAL LOW (ref 135–145)
SODIUM UR-SCNC: 101 MMOL/L — SIGNIFICANT CHANGE UP
SODIUM UR-SCNC: 106 MMOL/L — SIGNIFICANT CHANGE UP
SODIUM UR-SCNC: 122 MMOL/L — SIGNIFICANT CHANGE UP
SODIUM UR-SCNC: 130 MMOL/L — SIGNIFICANT CHANGE UP
SODIUM UR-SCNC: 54 MMOL/L — SIGNIFICANT CHANGE UP
SODIUM UR-SCNC: 64 MMOL/L — SIGNIFICANT CHANGE UP
SODIUM UR-SCNC: 73 MMOL/L — SIGNIFICANT CHANGE UP
SP GR SPEC: 1.02 — SIGNIFICANT CHANGE UP (ref 1.01–1.05)
T3 SERPL-MCNC: 84 NG/DL — SIGNIFICANT CHANGE UP (ref 80–200)
T4 AB SER-ACNC: 6.16 UG/DL — SIGNIFICANT CHANGE UP (ref 5.1–13)
T4 FREE SERPL-MCNC: 1.2 NG/DL — SIGNIFICANT CHANGE UP (ref 0.9–1.8)
T4 FREE SERPL-MCNC: 1.3 NG/DL — SIGNIFICANT CHANGE UP (ref 0.9–1.8)
TSH SERPL-MCNC: 13.13 UIU/ML — HIGH (ref 0.27–4.2)
TSH SERPL-MCNC: 8.25 UIU/ML — HIGH (ref 0.27–4.2)
UROBILINOGEN FLD QL: SIGNIFICANT CHANGE UP
UUN UR-MCNC: 163.8 MG/DL — SIGNIFICANT CHANGE UP
WBC # BLD: 18.02 K/UL — HIGH (ref 3.8–10.5)
WBC # FLD AUTO: 18.02 K/UL — HIGH (ref 3.8–10.5)
WBC UR QL: 1 /HPF — SIGNIFICANT CHANGE UP (ref 0–5)

## 2022-11-12 PROCEDURE — 99291 CRITICAL CARE FIRST HOUR: CPT | Mod: GC

## 2022-11-12 PROCEDURE — 70450 CT HEAD/BRAIN W/O DYE: CPT | Mod: 26

## 2022-11-12 PROCEDURE — 99223 1ST HOSP IP/OBS HIGH 75: CPT

## 2022-11-12 PROCEDURE — 74177 CT ABD & PELVIS W/CONTRAST: CPT | Mod: 26

## 2022-11-12 RX ORDER — INSULIN LISPRO 100/ML
VIAL (ML) SUBCUTANEOUS AT BEDTIME
Refills: 0 | Status: DISCONTINUED | OUTPATIENT
Start: 2022-11-12 | End: 2022-11-17

## 2022-11-12 RX ORDER — SODIUM CHLORIDE 9 MG/ML
1000 INJECTION, SOLUTION INTRAVENOUS
Refills: 0 | Status: DISCONTINUED | OUTPATIENT
Start: 2022-11-12 | End: 2022-11-17

## 2022-11-12 RX ORDER — LEVOTHYROXINE SODIUM 125 MCG
25 TABLET ORAL DAILY
Refills: 0 | Status: DISCONTINUED | OUTPATIENT
Start: 2022-11-12 | End: 2022-11-17

## 2022-11-12 RX ORDER — FOLIC ACID 0.8 MG
1 TABLET ORAL DAILY
Refills: 0 | Status: DISCONTINUED | OUTPATIENT
Start: 2022-11-12 | End: 2022-11-17

## 2022-11-12 RX ORDER — GLUCAGON INJECTION, SOLUTION 0.5 MG/.1ML
1 INJECTION, SOLUTION SUBCUTANEOUS ONCE
Refills: 0 | Status: DISCONTINUED | OUTPATIENT
Start: 2022-11-12 | End: 2022-11-17

## 2022-11-12 RX ORDER — CHLORHEXIDINE GLUCONATE 213 G/1000ML
1 SOLUTION TOPICAL
Refills: 0 | Status: DISCONTINUED | OUTPATIENT
Start: 2022-11-12 | End: 2022-11-17

## 2022-11-12 RX ORDER — METFORMIN HYDROCHLORIDE 850 MG/1
1 TABLET ORAL
Qty: 0 | Refills: 0 | DISCHARGE

## 2022-11-12 RX ORDER — INSULIN LISPRO 100/ML
VIAL (ML) SUBCUTANEOUS
Refills: 0 | Status: DISCONTINUED | OUTPATIENT
Start: 2022-11-12 | End: 2022-11-17

## 2022-11-12 RX ORDER — DEXTROSE 50 % IN WATER 50 %
25 SYRINGE (ML) INTRAVENOUS ONCE
Refills: 0 | Status: DISCONTINUED | OUTPATIENT
Start: 2022-11-12 | End: 2022-11-17

## 2022-11-12 RX ORDER — LANOLIN ALCOHOL/MO/W.PET/CERES
3 CREAM (GRAM) TOPICAL AT BEDTIME
Refills: 0 | Status: DISCONTINUED | OUTPATIENT
Start: 2022-11-12 | End: 2022-11-12

## 2022-11-12 RX ORDER — DEXTROSE 50 % IN WATER 50 %
12.5 SYRINGE (ML) INTRAVENOUS ONCE
Refills: 0 | Status: DISCONTINUED | OUTPATIENT
Start: 2022-11-12 | End: 2022-11-17

## 2022-11-12 RX ORDER — SODIUM CHLORIDE 9 MG/ML
1000 INJECTION, SOLUTION INTRAVENOUS
Refills: 0 | Status: DISCONTINUED | OUTPATIENT
Start: 2022-11-12 | End: 2022-11-12

## 2022-11-12 RX ORDER — MAGNESIUM SULFATE 500 MG/ML
2 VIAL (ML) INJECTION ONCE
Refills: 0 | Status: COMPLETED | OUTPATIENT
Start: 2022-11-12 | End: 2022-11-12

## 2022-11-12 RX ORDER — DESMOPRESSIN ACETATE 0.1 MG/1
1 TABLET ORAL ONCE
Refills: 0 | Status: COMPLETED | OUTPATIENT
Start: 2022-11-12 | End: 2022-11-12

## 2022-11-12 RX ORDER — MAGNESIUM SULFATE 500 MG/ML
2 VIAL (ML) INJECTION ONCE
Refills: 0 | Status: DISCONTINUED | OUTPATIENT
Start: 2022-11-12 | End: 2022-11-12

## 2022-11-12 RX ORDER — SODIUM CHLORIDE 9 MG/ML
500 INJECTION, SOLUTION INTRAVENOUS
Refills: 0 | Status: DISCONTINUED | OUTPATIENT
Start: 2022-11-12 | End: 2022-11-12

## 2022-11-12 RX ORDER — ATENOLOL 25 MG/1
50 TABLET ORAL DAILY
Refills: 0 | Status: DISCONTINUED | OUTPATIENT
Start: 2022-11-12 | End: 2022-11-17

## 2022-11-12 RX ORDER — SIMVASTATIN 20 MG/1
40 TABLET, FILM COATED ORAL AT BEDTIME
Refills: 0 | Status: DISCONTINUED | OUTPATIENT
Start: 2022-11-12 | End: 2022-11-17

## 2022-11-12 RX ORDER — ROPINIROLE 8 MG/1
1 TABLET, FILM COATED, EXTENDED RELEASE ORAL
Qty: 0 | Refills: 0 | DISCHARGE

## 2022-11-12 RX ORDER — LANOLIN ALCOHOL/MO/W.PET/CERES
9 CREAM (GRAM) TOPICAL AT BEDTIME
Refills: 0 | Status: DISCONTINUED | OUTPATIENT
Start: 2022-11-12 | End: 2022-11-17

## 2022-11-12 RX ORDER — DEXTROSE 50 % IN WATER 50 %
15 SYRINGE (ML) INTRAVENOUS ONCE
Refills: 0 | Status: DISCONTINUED | OUTPATIENT
Start: 2022-11-12 | End: 2022-11-17

## 2022-11-12 RX ORDER — HEPARIN SODIUM 5000 [USP'U]/ML
5000 INJECTION INTRAVENOUS; SUBCUTANEOUS EVERY 8 HOURS
Refills: 0 | Status: DISCONTINUED | OUTPATIENT
Start: 2022-11-12 | End: 2022-11-17

## 2022-11-12 RX ADMIN — HEPARIN SODIUM 5000 UNIT(S): 5000 INJECTION INTRAVENOUS; SUBCUTANEOUS at 22:03

## 2022-11-12 RX ADMIN — Medication 25 MICROGRAM(S): at 04:33

## 2022-11-12 RX ADMIN — Medication 9 MILLIGRAM(S): at 03:23

## 2022-11-12 RX ADMIN — SODIUM CHLORIDE 100 MILLILITER(S): 9 INJECTION, SOLUTION INTRAVENOUS at 12:53

## 2022-11-12 RX ADMIN — Medication 2: at 17:28

## 2022-11-12 RX ADMIN — Medication 2: at 08:14

## 2022-11-12 RX ADMIN — Medication 250 MILLIGRAM(S): at 00:57

## 2022-11-12 RX ADMIN — SODIUM CHLORIDE 100 MILLILITER(S): 9 INJECTION, SOLUTION INTRAVENOUS at 06:32

## 2022-11-12 RX ADMIN — Medication 1 TABLET(S): at 12:53

## 2022-11-12 RX ADMIN — Medication 1 MILLIGRAM(S): at 12:53

## 2022-11-12 RX ADMIN — HEPARIN SODIUM 5000 UNIT(S): 5000 INJECTION INTRAVENOUS; SUBCUTANEOUS at 04:42

## 2022-11-12 RX ADMIN — SODIUM CHLORIDE 100 MILLILITER(S): 9 INJECTION, SOLUTION INTRAVENOUS at 08:28

## 2022-11-12 RX ADMIN — CHLORHEXIDINE GLUCONATE 1 APPLICATION(S): 213 SOLUTION TOPICAL at 05:28

## 2022-11-12 RX ADMIN — SODIUM CHLORIDE 75 MILLILITER(S): 9 INJECTION, SOLUTION INTRAVENOUS at 19:52

## 2022-11-12 RX ADMIN — HEPARIN SODIUM 5000 UNIT(S): 5000 INJECTION INTRAVENOUS; SUBCUTANEOUS at 13:05

## 2022-11-12 RX ADMIN — DESMOPRESSIN ACETATE 1 MICROGRAM(S): 0.1 TABLET ORAL at 04:33

## 2022-11-12 RX ADMIN — Medication 25 GRAM(S): at 12:53

## 2022-11-12 RX ADMIN — SODIUM CHLORIDE 500 MILLILITER(S): 9 INJECTION, SOLUTION INTRAVENOUS at 04:37

## 2022-11-12 RX ADMIN — Medication 9 MILLIGRAM(S): at 22:04

## 2022-11-12 RX ADMIN — SIMVASTATIN 40 MILLIGRAM(S): 20 TABLET, FILM COATED ORAL at 22:03

## 2022-11-12 NOTE — CONSULT NOTE ADULT - PROBLEM SELECTOR RECOMMENDATION 2
Pt with hyperkalemia in the setting of CKD, losartan, fineronone, and hyperglycemia. Upon review of [facility], serum potassium was initially 7.4 on 11/11. Pt received calcium gluconate, Insulin/D50, and Lokelma. Serum potassium is elevated/improved to 5.4 this morning. Recommend to repeat BMP. Consider and additional dose of Lokelma if potassium remains elevated. Continue to hold losartan and fineronone. Hyperglycemia management per primary team Monitor serum potassium. Pt with hyperkalemia in the setting of CKD, Losartan, Fineronone, hyperglycemia and likely urine retention. Serum potassium elevated at 7.4 on 11/11. Pt received calcium gluconate, Insulin/D50, and Lokelma. Serum potassium is elevated/improved to 5.4 this morning. Recommend to repeat BMP. Consider and additional dose of Lokelma if potassium remains elevated. Continue to hold Losartan and Fineronone. Hyperglycemia management per primary team Monitor serum potassium.

## 2022-11-12 NOTE — PROGRESS NOTE ADULT - SUBJECTIVE AND OBJECTIVE BOX
Patient is a 67y old  Female who presents with a chief complaint of Hyponatremia (2022 06:04)      INTERVAL HPI/OVERNIGHT EVENTS:   No overnight events   Afebrile, hemodynamically stable     ICU Vital Signs Last 24 Hrs  T(C): 36.2 (2022 04:00), Max: 36.7 (2022 23:00)  T(F): 97.1 (2022 04:00), Max: 98 (2022 23:00)  HR: 65 (:00) (60 - 78)  BP: 113/51 (:) (112/47 - 171/67)  BP(mean): 63 (:00) (58 - 80)  ABP: --  ABP(mean): --  RR: 15 (:00) (12 - 20)  SpO2: 100% (:) (99% - 100%)    O2 Parameters below as of 2022 06:00  Patient On (Oxygen Delivery Method): room air          I&O's Summary    2022 07:01  -  2022 07:00  --------------------------------------------------------  IN: 600 mL / OUT: 2675 mL / NET: -2075 mL          LABS:                        9.7    18.02 )-----------( 189      ( 2022 02:40 )             28.0     12    x   |  x   |  31<H>  ----------------------------<  231<H>  x    |  17<L>  |  1.43<H>    Ca    9.3      2022 06:20  Phos  4.9     12  Mg     1.70     12    TPro  7.6  /  Alb  4.2  /  TBili  0.3  /  DBili  x   /  AST  28  /  ALT  29  /  AlkPhos  76  11-11      Urinalysis Basic - ( 2022 02:45 )    Color: Colorless / Appearance: Clear / S.018 / pH: x  Gluc: x / Ketone: Negative  / Bili: Negative / Urobili: <2 mg/dL   Blood: x / Protein: Negative / Nitrite: Negative   Leuk Esterase: Negative / RBC: 16 /HPF / WBC 1 /HPF   Sq Epi: x / Non Sq Epi: 1 /HPF / Bacteria: Negative      CAPILLARY BLOOD GLUCOSE      POCT Blood Glucose.: 243 mg/dL (2022 17:02)        RADIOLOGY & ADDITIONAL TESTS:    Consultant(s) Notes Reviewed:  [x ] YES  [ ] NO    MEDICATIONS  (STANDING):  atenolol  Tablet 50 milliGRAM(s) Oral daily  chlorhexidine 2% Cloths 1 Application(s) Topical <User Schedule>  dextrose 5%. 500 milliLiter(s) (500 mL/Hr) IV Continuous <Continuous>  dextrose 5%. 1000 milliLiter(s) (50 mL/Hr) IV Continuous <Continuous>  dextrose 5%. 1000 milliLiter(s) (100 mL/Hr) IV Continuous <Continuous>  dextrose 5%. 1000 milliLiter(s) (100 mL/Hr) IV Continuous <Continuous>  dextrose 50% Injectable 25 Gram(s) IV Push once  dextrose 50% Injectable 12.5 Gram(s) IV Push once  dextrose 50% Injectable 25 Gram(s) IV Push once  folic acid 1 milliGRAM(s) Oral daily  glucagon  Injectable 1 milliGRAM(s) IntraMuscular once  heparin   Injectable 5000 Unit(s) SubCutaneous every 8 hours  insulin lispro (ADMELOG) corrective regimen sliding scale   SubCutaneous three times a day before meals  insulin lispro (ADMELOG) corrective regimen sliding scale   SubCutaneous at bedtime  levothyroxine 25 MICROGram(s) Oral daily  melatonin 9 milliGRAM(s) Oral at bedtime  multivitamin 1 Tablet(s) Oral daily  simvastatin 40 milliGRAM(s) Oral at bedtime    MEDICATIONS  (PRN):  dextrose Oral Gel 15 Gram(s) Oral once PRN Blood Glucose LESS THAN 70 milliGRAM(s)/deciliter      PHYSICAL EXAM:  GENERAL:   HEAD:  Atraumatic, Normocephalic  EYES: EOMI, PERRLA, conjunctiva and sclera clear  NECK: Supple, No JVD, Normal thyroid, no enlarged nodes  NERVOUS SYSTEM:  Alert & Awake.   CHEST/LUNG: B/L good air entry; No rales, rhonchi, or wheezing  HEART: S1S2 normal, no S3, Regular rate and rhythm; No murmurs  ABDOMEN: Soft, Nontender, Nondistended; Bowel sounds present  EXTREMITIES:  2+ Peripheral Pulses, No clubbing, cyanosis, or edema  LYMPH: No lymphadenopathy noted  SKIN: No rashes or lesions    Care Discussed with Consultants/Other Providers [ x] YES  [ ] NO Patient is a 67y old  Female who presents with a chief complaint of Hyponatremia (2022 06:04)      INTERVAL HPI/OVERNIGHT EVENTS:   Given 3% NaCl bolus O/N, then D5 and Desmopressin to halt rapid correction. Patient examined with family bedside, no complaints today.     ICU Vital Signs Last 24 Hrs  T(C): 36.2 (2022 04:00), Max: 36.7 (2022 23:00)  T(F): 97.1 (2022 04:00), Max: 98 (2022 23:00)  HR: 65 (:) (60 - 78)  BP: 113/51 (:) (112/47 - 171/67)  BP(mean): 63 (:) (58 - 80)  ABP: --  ABP(mean): --  RR: 15 (:) (12 - 20)  SpO2: 100% (:) (99% - 100%)    O2 Parameters below as of 2022 06:00  Patient On (Oxygen Delivery Method): room air          I&O's Summary    2022 07:01  -  2022 07:00  --------------------------------------------------------  IN: 600 mL / OUT: 2675 mL / NET: -2075 mL          LABS:                        9.7    18.02 )-----------( 189      ( 2022 02:40 )             28.0     12    x   |  x   |  31<H>  ----------------------------<  231<H>  x    |  17<L>  |  1.43<H>    Ca    9.3      2022 06:20  Phos  4.9     -12  Mg     1.70     12    TPro  7.6  /  Alb  4.2  /  TBili  0.3  /  DBili  x   /  AST  28  /  ALT  29  /  AlkPhos  76  11-11      Urinalysis Basic - ( 2022 02:45 )    Color: Colorless / Appearance: Clear / S.018 / pH: x  Gluc: x / Ketone: Negative  / Bili: Negative / Urobili: <2 mg/dL   Blood: x / Protein: Negative / Nitrite: Negative   Leuk Esterase: Negative / RBC: 16 /HPF / WBC 1 /HPF   Sq Epi: x / Non Sq Epi: 1 /HPF / Bacteria: Negative      CAPILLARY BLOOD GLUCOSE      POCT Blood Glucose.: 243 mg/dL (2022 17:02)        RADIOLOGY & ADDITIONAL TESTS:    Consultant(s) Notes Reviewed:  [x ] YES  [ ] NO    MEDICATIONS  (STANDING):  atenolol  Tablet 50 milliGRAM(s) Oral daily  chlorhexidine 2% Cloths 1 Application(s) Topical <User Schedule>  dextrose 5%. 500 milliLiter(s) (500 mL/Hr) IV Continuous <Continuous>  dextrose 5%. 1000 milliLiter(s) (50 mL/Hr) IV Continuous <Continuous>  dextrose 5%. 1000 milliLiter(s) (100 mL/Hr) IV Continuous <Continuous>  dextrose 5%. 1000 milliLiter(s) (100 mL/Hr) IV Continuous <Continuous>  dextrose 50% Injectable 25 Gram(s) IV Push once  dextrose 50% Injectable 12.5 Gram(s) IV Push once  dextrose 50% Injectable 25 Gram(s) IV Push once  folic acid 1 milliGRAM(s) Oral daily  glucagon  Injectable 1 milliGRAM(s) IntraMuscular once  heparin   Injectable 5000 Unit(s) SubCutaneous every 8 hours  insulin lispro (ADMELOG) corrective regimen sliding scale   SubCutaneous three times a day before meals  insulin lispro (ADMELOG) corrective regimen sliding scale   SubCutaneous at bedtime  levothyroxine 25 MICROGram(s) Oral daily  melatonin 9 milliGRAM(s) Oral at bedtime  multivitamin 1 Tablet(s) Oral daily  simvastatin 40 milliGRAM(s) Oral at bedtime    MEDICATIONS  (PRN):  dextrose Oral Gel 15 Gram(s) Oral once PRN Blood Glucose LESS THAN 70 milliGRAM(s)/deciliter      PHYSICAL EXAM:  GENERAL:   HEAD:  Atraumatic, Normocephalic  EYES: EOMI, PERRLA, conjunctiva and sclera clear  NECK: Supple, No JVD, Normal thyroid, no enlarged nodes  NERVOUS SYSTEM:  Alert & Awake.   CHEST/LUNG: B/L good air entry; No rales, rhonchi, or wheezing  HEART: S1S2 normal, no S3, Regular rate and rhythm; No murmurs  ABDOMEN: Soft, Nontender, Nondistended; Bowel sounds present  EXTREMITIES:  2+ Peripheral Pulses, No clubbing, cyanosis, or edema  LYMPH: No lymphadenopathy noted  SKIN: No rashes or lesions    Care Discussed with Consultants/Other Providers [ x] YES  [ ] NO

## 2022-11-12 NOTE — CONSULT NOTE ADULT - PROBLEM SELECTOR RECOMMENDATION 3
Pt with likely CKD. [facility] [place] was intially 1.53 on 11/11, and improved to 1.45 today. No prior labs available for review. Exact duration of SCr elevation remains unclear although pt was told of kidney injury by PCP ~2 months ago and is currently on fineronone, and losartan. Pt is polyuric, documented urine output is 2.6L cc over the past 24 hours. UA showing hematuria. Obtain records for baseline SCr. Obtain renal US. Monitor labs and urine output. Avoid nephrotoxins. Dose medications as per eGFR.    If you have any questions, please feel free to contact me  Mark Valdez  Nephrology Fellow  [phone] / [employer] Teams(Preferred)  (After 5pm or on weekends please page the on-call fellow) Pt. with history of CKD of unknown duration. Upon review of [place], Scr was 1.53 on 11/11, and improved to 1.45 today. No prior labs available for review. Pt with increased UOP after Lao catheter placement, suggesting of urine retention. Pt. with MAGUE on CKD in setting of recent diarrhea and likely urine retention. Obtain outpatient Scr level from PCP's office. Check renal US. Monitor labs and urine output. Avoid nephrotoxins. Dose medications as per eGFR.    If you have any questions, please feel free to contact me  Mark Valdez  Nephrology Fellow  [phone] / [employer] Teams(Preferred)  (After 5pm or on weekends please page the on-call fellow)

## 2022-11-12 NOTE — CONSULT NOTE ADULT - PROBLEM SELECTOR RECOMMENDATION 9
Pt with symptomatic hypo-osmolar euvolemic hyponatremia in the setting of diarrhea. Pt received 150 cc bolus of 3% hypertonic saline on 11/11. SNa remains low but improved to 116 today. Pt received desmopressin and D50 by primary team for high urine output. Pt with known prior h/o hyponatremia, was admitted to [facility] in ~2015 for hyponatremia. No prior labs available for review. Exact duration of hyponatremia remains unclear. Serum osm is 267, urine osm is 209, and urine Na is 54. Pt with hyponatremia likely due to GI losses vs SIADH. Recommend to recheck BMP now. Pt may require additional hypertonic saline. Monitor SNa q2h. Avoid overcorrection. Goal for SNa increase is 6-8 mEq/L within the first 24 hours and 12-14 mEq/L within the first 48 hours. Pt with symptomatic hypo-osmolar euvolemic hyponatremia in the setting of diarrhea and ? urine retention. Pt. received 150 cc bolus of 3% hypertonic saline on 11/11. SNa remains low but improved to 116 today. Pt. received desmopressin and D50 by primary medical/ICU team for high urine output. Pt. with history of hyponatremia in the past (2015). No prior labs available for review. Exact duration of hyponatremia remains unclear. Serum osm is 267, urine osm is 209, and urine Na is 54. Pt with increased UOP after Lao catheter placement, suggesting of urine retention. Recommend to recheck BMP now. Pt may require additional hypertonic saline. Monitor SNa q2h. Avoid overcorrection (not more than 6-8 mEq/L in 24 hours).

## 2022-11-12 NOTE — PROGRESS NOTE ADULT - ATTENDING COMMENTS
67 F with htn, hld, DM2 here with weakness, ?seizure found to have acute hyponatremia and hyperkalemia    no seizures this AM, no shaking    # acute hyponatremia  # acute metabolic encephalopathy  # MAGUE  - unclear etiology, could be due to urinary retention vs medication use  - goal Na today is ~119, will c/w d5w IV for now, no more need for DDAVP  - serial BMPs, urine osms/na  - monitor uop for MAGUE  - patient more alert    high risk for decompensation given acute hyponatremia    DVT ppx: hsq  GOC: full code as per daughters at bedside    Critically ill patient requiring frequent bedside visits with therapy changes.

## 2022-11-12 NOTE — CONSULT NOTE ADULT - SUBJECTIVE AND OBJECTIVE BOX
[facility] DIVISION OF KIDNEY DISEASES AND HYPERTENSION -- [phone]  -- INITIAL CONSULT NOTE  --------------------------------------------------------------------------------  HPI: 66 yo F with h/o ?CKD, HTN, DM, HLD, and iron deficiency anemia presented with weakness and dizziness. Nephrology consulted for hyponatremia. Upon review of [place]IE, SNa was initially 112 on 11/11. Pt received 150 cc bolus of 3% hypertonic saline on 11/11. SNa remains low but improved to 116 today. Pt received desmopressin and D50 by primary team for high urine output.    Pt seen and evaluated with daughters bedside in the MICU. Pt reports having watery diarrhea for the past ~2 weeks with 3-4 BM per day. Diarrhea improved over the past 2 days with anti-diarrheal medication. Pt has not been eating or drinking much over the past 2 weeks. Pt endorses intermittent dizziness and BL LE heaviness during this 2 week period. Yesterday (11/11), pt had an episode of unresponsiveness with fecal incontinence which prompted family to bring her to the ER. Pt was admitted ~2015 to [facility] for hyponatremia. Family states that pt was told that she has baseline kidney damage ~2 months ago by PCP. Family is not aware of baseline SCr and pt has never seen a nephrologist before. Denies any headaches, nausea, vomiting, fevers/chills, chest pain, palpitations, SOB, abdominal pain, and leg swelling.     PAST HISTORY  --------------------------------------------------------------------------------  PAST MEDICAL & SURGICAL HISTORY:    FAMILY HISTORY: No significant family history    PAST SOCIAL HISTORY: Denies ETOH and tobacco use    ALLERGIES & MEDICATIONS  --------------------------------------------------------------------------------  Allergies    No Known Allergies    Intolerances      Standing Inpatient Medications  chlorhexidine 2% Cloths 1 Application(s) Topical <User Schedule>  dextrose 5%. 500 milliLiter(s) IV Continuous <Continuous>  dextrose 5%. 1000 milliLiter(s) IV Continuous <Continuous>  dextrose 5%. 1000 milliLiter(s) IV Continuous <Continuous>  dextrose 50% Injectable 25 Gram(s) IV Push once  dextrose 50% Injectable 12.5 Gram(s) IV Push once  dextrose 50% Injectable 25 Gram(s) IV Push once  folic acid 1 milliGRAM(s) Oral daily  glucagon  Injectable 1 milliGRAM(s) IntraMuscular once  heparin   Injectable 5000 Unit(s) SubCutaneous every 8 hours  insulin lispro (ADMELOG) corrective regimen sliding scale   SubCutaneous three times a day before meals  insulin lispro (ADMELOG) corrective regimen sliding scale   SubCutaneous at bedtime  levothyroxine 25 MICROGram(s) Oral daily  melatonin 9 milliGRAM(s) Oral at bedtime  multivitamin 1 Tablet(s) Oral daily  simvastatin 40 milliGRAM(s) Oral at bedtime    PRN Inpatient Medications  dextrose Oral Gel 15 Gram(s) Oral once PRN      REVIEW OF SYSTEMS  --------------------------------------------------------------------------------  Gen: +weakness  Skin: No rashes  Head/Eyes/Ears/Mouth: No headache  Respiratory: No dyspnea  CV: No chest pain  GI: +diarrhea and decreases PO intake  : No increased frequency  MSK: No joint pain/swelling; no edema  Neuro: +dizziness and episode of unresponsiveness    All other systems were reviewed and are negative, except as noted.     VITALS/PHYSICAL EXAM  --------------------------------------------------------------------------------  T(C): 36.2 (11-12-22 @ 04:00), Max: 36.7 (11-11-22 @ 23:00)  HR: 60 (11-12-22 @ 05:00) (60 - 78)  BP: 112/47 (11-12-22 @ 05:00) (112/47 - 171/67)  RR: 17 (11-12-22 @ 05:00) (12 - 20)  SpO2: 100% (11-12-22 @ 05:00) (99% - 100%)  Wt(kg): --  Height (cm): 152.4 (11-12-22 @ 02:08)  Weight (kg): 60.2 (11-12-22 @ 02:08)  BMI (kg/m2): 25.9 (11-12-22 @ 02:08)  BSA (m2): 1.57 (11-12-22 @ 02:08)      11-11-22 @ 07:01  -  11-12-22 @ 06:05  --------------------------------------------------------  IN: 500 mL / OUT: 2350 mL / NET: -1850 mL      Physical Exam:  Gen: NAD  HEENT: MMM  Pulm: CTA B/L  CV: S1S2  Abd: Soft, +BS   Ext: No LE edema B/L  Neuro: Awake  Skin: Warm and dry  Vascular access: Peripheral IV    LABS/STUDIES  --------------------------------------------------------------------------------              9.7    18.02 >-----------<  189      [11-12-22 @ 02:40]              28.0     116  |  83  |  34  ----------------------------<  232      [11-12-22 @ 02:40]  5.4   |  16  |  1.45        Ca     9.8     [11-12-22 @ 02:40]      Mg     1.70     [11-12-22 @ 02:40]      Phos  4.6     [11-12-22 @ 02:40]    TPro  7.6  /  Alb  4.2  /  TBili  0.3  /  DBili  x   /  AST  28  /  ALT  29  /  AlkPhos  76  [11-11-22 @ 22:31]            [11-12-22 @ 02:40]  Serum Osmolality 267      [11-11-22 @ 22:31]    Creatinine Trend:  SCr 1.45 [11-12 @ 02:40]  SCr 1.44 [11-11 @ 22:31]  SCr 1.53 [11-11 @ 20:30]    Urinalysis - [11-12-22 @ 02:45]      Color Colorless / Appearance Clear / SG 1.018 / pH 6.5      Gluc Negative / Ketone Negative  / Bili Negative / Urobili <2 mg/dL       Blood Small / Protein Negative / Leuk Est Negative / Nitrite Negative      RBC 16 / WBC 1 / Hyaline  / Gran  / Sq Epi  / Non Sq Epi 1 / Bacteria Negative    Urine Creatinine 11      [11-12-22 @ 02:45]  Urine Sodium 54      [11-12-22 @ 02:45]  Urine Urea Nitrogen 163.8      [11-12-22 @ 02:45]  Urine Potassium 8.8      [11-11-22 @ 23:40]  Urine Chloride 56      [11-11-22 @ 23:40]  Urine Osmolality 209      [11-12-22 @ 02:45]    TSH 13.13      [11-12-22 @ 02:40] [facility] DIVISION OF KIDNEY DISEASES AND HYPERTENSION -- [phone]  -- INITIAL CONSULT NOTE  --------------------------------------------------------------------------------  HPI: 67-year-old female with history of CKD, HTN, DM, HLD, and iron deficiency anemia presented with weakness and dizziness. Nephrology consulted for hyponatremia, hyperkalemia and elevated Scr level. Upon review of labs on [place], SNa was initially 112 on 11/11. Pt received 150 cc bolus of 3% hypertonic saline on 11/11. SNa improved to 116 today. Pt. received desmopressin and D50 by primary medical team for high urine output.    Pt. seen and evaluated in MICU with family/daughters at bedside. Pt reports having watery diarrhea for the past ~2 weeks with 3-4 BMs per day. Diarrhea improved over the past 2 days with anti-diarrheal medication. Pt has not been eating or drinking much over the past 2 weeks. Pt endorses intermittent dizziness and B/L LE heaviness during this 2 week period. As per daughter, pt. with decreased UOP since last 2 days. Yesterday (11/11), pt had an episode of unresponsiveness with fecal incontinence which prompted family to bring her to the ER. Pt. was admitted to [facility] in 2015 for hyponatremia (as per family). As per daughters, pt. was made aware of her kidney disease by her PCP since last ~2 months. Baseline Scr level however unknown and pt has not seen a nephrologist as outpatient in the past. No fever, chest pain, SOB, abdominal pain or HA at time of evaluation.     PAST HISTORY  --------------------------------------------------------------------------------  PAST MEDICAL & SURGICAL HISTORY:    FAMILY HISTORY: No significant family history    PAST SOCIAL HISTORY: Denies ETOH and tobacco use    ALLERGIES & MEDICATIONS  --------------------------------------------------------------------------------  Allergies    No Known Allergies    Intolerances    Standing Inpatient Medications  chlorhexidine 2% Cloths 1 Application(s) Topical <User Schedule>  dextrose 5%. 500 milliLiter(s) IV Continuous <Continuous>  dextrose 5%. 1000 milliLiter(s) IV Continuous <Continuous>  dextrose 5%. 1000 milliLiter(s) IV Continuous <Continuous>  dextrose 50% Injectable 25 Gram(s) IV Push once  dextrose 50% Injectable 12.5 Gram(s) IV Push once  dextrose 50% Injectable 25 Gram(s) IV Push once  folic acid 1 milliGRAM(s) Oral daily  glucagon  Injectable 1 milliGRAM(s) IntraMuscular once  heparin   Injectable 5000 Unit(s) SubCutaneous every 8 hours  insulin lispro (ADMELOG) corrective regimen sliding scale   SubCutaneous three times a day before meals  insulin lispro (ADMELOG) corrective regimen sliding scale   SubCutaneous at bedtime  levothyroxine 25 MICROGram(s) Oral daily  melatonin 9 milliGRAM(s) Oral at bedtime  multivitamin 1 Tablet(s) Oral daily  simvastatin 40 milliGRAM(s) Oral at bedtime    PRN Inpatient Medications  dextrose Oral Gel 15 Gram(s) Oral once PRN    REVIEW OF SYSTEMS  --------------------------------------------------------------------------------  Gen: See HPI, generalized weakness, decreased PO intake  Skin: No rash  Head/Eyes/Ears/Mouth: No headache  Respiratory: No dyspnea  CV: No chest pain  GI: See HPI, +diarrhea   : See HPI, decreased UOP last 2 days  MSK: No joint pain/swelling; no edema  Neuro: See HPI, +dizziness and episode of unresponsiveness    All other systems were reviewed and are negative, except as noted.     VITALS/PHYSICAL EXAM  --------------------------------------------------------------------------------  T(C): 36.2 (11-12-22 @ 04:00), Max: 36.7 (11-11-22 @ 23:00)  HR: 60 (11-12-22 @ 05:00) (60 - 78)  BP: 112/47 (11-12-22 @ 05:00) (112/47 - 171/67)  RR: 17 (11-12-22 @ 05:00) (12 - 20)  SpO2: 100% (11-12-22 @ 05:00) (99% - 100%)  Wt(kg): --  Height (cm): 152.4 (11-12-22 @ 02:08)  Weight (kg): 60.2 (11-12-22 @ 02:08)  BMI (kg/m2): 25.9 (11-12-22 @ 02:08)  BSA (m2): 1.57 (11-12-22 @ 02:08)    11-11-22 @ 07:01  -  11-12-22 @ 06:05  --------------------------------------------------------  IN: 500 mL / OUT: 2350 mL / NET: -1850 mL    Physical Exam:    Gen: resting  HEENT: MMM  Pulm: CTA B/L  CV: S1S2+  Abd: Soft, +BS   Ext: No LE edema B/L  Neuro: Awake  Skin: Warm and dry  Vascular access: Peripheral IV line    LABS/STUDIES  --------------------------------------------------------------------------------              9.7    18.02 >-----------<  189      [11-12-22 @ 02:40]              28.0     116  |  83  |  34  ----------------------------<  232      [11-12-22 @ 02:40]  5.4   |  16  |  1.45        Ca     9.8     [11-12-22 @ 02:40]      Mg     1.70     [11-12-22 @ 02:40]      Phos  4.6     [11-12-22 @ 02:40]    TPro  7.6  /  Alb  4.2  /  TBili  0.3  /  DBili  x   /  AST  28  /  ALT  29  /  AlkPhos  76  [11-11-22 @ 22:31]    11-12    119<LL>  |  87<L>  |  29<H>  ----------------------------<  197<H>  4.4   |  18<L>  |  1.31<H>    Ca    8.8      12 Nov 2022 10:35  Phos  4.8     11-12  Mg     1.40     11-12    TPro  7.6  /  Alb  4.2  /  TBili  0.3  /  DBili  x   /  AST  28  /  ALT  29  /  AlkPhos  76  11-11          [11-12-22 @ 02:40]  Serum Osmolality 267      [11-11-22 @ 22:31]    Creatinine Trend:  SCr 1.45 [11-12 @ 02:40]  SCr 1.44 [11-11 @ 22:31]  SCr 1.53 [11-11 @ 20:30]    Urinalysis - [11-12-22 @ 02:45]      Color Colorless / Appearance Clear / SG 1.018 / pH 6.5      Gluc Negative / Ketone Negative  / Bili Negative / Urobili <2 mg/dL       Blood Small / Protein Negative / Leuk Est Negative / Nitrite Negative      RBC 16 / WBC 1 / Hyaline  / Gran  / Sq Epi  / Non Sq Epi 1 / Bacteria Negative    Urine Creatinine 11      [11-12-22 @ 02:45]  Urine Sodium 54      [11-12-22 @ 02:45]  Urine Urea Nitrogen 163.8      [11-12-22 @ 02:45]  Urine Potassium 8.8      [11-11-22 @ 23:40]  Urine Chloride 56      [11-11-22 @ 23:40]  Urine Osmolality 209      [11-12-22 @ 02:45]    TSH 13.13      [11-12-22 @ 02:40]

## 2022-11-12 NOTE — PATIENT PROFILE ADULT - FALL HARM RISK - HARM RISK INTERVENTIONS
Assistance with ambulation/Assistance OOB with selected safe patient handling equipment/Communicate Risk of Fall with Harm to all staff/Discuss with provider need for PT consult/Monitor gait and stability/Reinforce activity limits and safety measures with patient and family/Tailored Fall Risk Interventions/Visual Cue: Yellow wristband and red socks/Bed in lowest position, wheels locked, appropriate side rails in place/Call bell, personal items and telephone in reach/Instruct patient to call for assistance before getting out of bed or chair/Non-slip footwear when patient is out of bed/[place] to call system/Physically safe environment - no spills, clutter or unnecessary equipment/Purposeful Proactive Rounding/Room/bathroom lighting operational, light cord in reach

## 2022-11-12 NOTE — CHART NOTE - NSCHARTNOTEFT_GEN_A_CORE
:  Oral Aguirre Fellow    INDICATION:    [x] Acute Hyponatremia    [ ] Other:       PROCEDURE:    [x] LIMITED ECHO    [x] LIMITED CHEST    [ ] LIMITED ABDOMINAL    [ ] OTHER      FINDINGS:  Cardiac:   LV: Grossly Normal LV Function.   RV: Normal RV size.   Pericardium: No pericardial effusion.      Pulmonary: A lines throughout. No pleural Effusion     INTERPRETATION:  Grossly Normal cardiac function.   Normal aeration pattern of the lung.    Images stored on [facility]

## 2022-11-12 NOTE — CONSULT NOTE ADULT - ATTENDING COMMENTS
Pt. with hyponatremia, hyperkalemia and MAGUE on CKD. Last SNa increased to 119. Pt. currently on IV D5W for overcorrection. Scr decreased to 1.31 today. Hyperkalemia resolved with medical management. Assessment and plan for hyponatremia, hyperkalemia and MAGUE on CKD as outlined above. Monitor labs and urine output. Avoid any potential nephrotoxins. Dose medications as per eGFR. Avoid overcorrection of SNa.

## 2022-11-13 LAB
ANION GAP SERPL CALC-SCNC: 11 MMOL/L — SIGNIFICANT CHANGE UP (ref 7–14)
ANION GAP SERPL CALC-SCNC: 14 MMOL/L — SIGNIFICANT CHANGE UP (ref 7–14)
ANION GAP SERPL CALC-SCNC: 14 MMOL/L — SIGNIFICANT CHANGE UP (ref 7–14)
ANION GAP SERPL CALC-SCNC: 15 MMOL/L — HIGH (ref 7–14)
BUN SERPL-MCNC: 16 MG/DL — SIGNIFICANT CHANGE UP (ref 7–23)
BUN SERPL-MCNC: 16 MG/DL — SIGNIFICANT CHANGE UP (ref 7–23)
BUN SERPL-MCNC: 18 MG/DL — SIGNIFICANT CHANGE UP (ref 7–23)
BUN SERPL-MCNC: 20 MG/DL — SIGNIFICANT CHANGE UP (ref 7–23)
BUN SERPL-MCNC: 22 MG/DL — SIGNIFICANT CHANGE UP (ref 7–23)
CALCIUM SERPL-MCNC: 8 MG/DL — LOW (ref 8.4–10.5)
CALCIUM SERPL-MCNC: 8.2 MG/DL — LOW (ref 8.4–10.5)
CALCIUM SERPL-MCNC: 8.3 MG/DL — LOW (ref 8.4–10.5)
CALCIUM SERPL-MCNC: 8.3 MG/DL — LOW (ref 8.4–10.5)
CALCIUM SERPL-MCNC: 8.6 MG/DL — SIGNIFICANT CHANGE UP (ref 8.4–10.5)
CHLORIDE SERPL-SCNC: 83 MMOL/L — LOW (ref 98–107)
CHLORIDE SERPL-SCNC: 84 MMOL/L — LOW (ref 98–107)
CHLORIDE SERPL-SCNC: 85 MMOL/L — LOW (ref 98–107)
CHLORIDE SERPL-SCNC: 85 MMOL/L — LOW (ref 98–107)
CO2 SERPL-SCNC: 18 MMOL/L — LOW (ref 22–31)
CO2 SERPL-SCNC: 18 MMOL/L — LOW (ref 22–31)
CO2 SERPL-SCNC: 19 MMOL/L — LOW (ref 22–31)
CO2 SERPL-SCNC: 20 MMOL/L — LOW (ref 22–31)
CO2 SERPL-SCNC: 21 MMOL/L — LOW (ref 22–31)
CREAT SERPL-MCNC: 0.98 MG/DL — SIGNIFICANT CHANGE UP (ref 0.5–1.3)
CREAT SERPL-MCNC: 1.02 MG/DL — SIGNIFICANT CHANGE UP (ref 0.5–1.3)
CREAT SERPL-MCNC: 1.06 MG/DL — SIGNIFICANT CHANGE UP (ref 0.5–1.3)
CREAT SERPL-MCNC: 1.06 MG/DL — SIGNIFICANT CHANGE UP (ref 0.5–1.3)
CREAT SERPL-MCNC: 1.08 MG/DL — SIGNIFICANT CHANGE UP (ref 0.5–1.3)
CULTURE RESULTS: NO GROWTH — SIGNIFICANT CHANGE UP
EGFR: 56 ML/MIN/1.73M2 — LOW
EGFR: 58 ML/MIN/1.73M2 — LOW
EGFR: 58 ML/MIN/1.73M2 — LOW
EGFR: 63 ML/MIN/1.73M2 — SIGNIFICANT CHANGE UP
GLUCOSE BLDC GLUCOMTR-MCNC: 136 MG/DL — HIGH (ref 70–99)
GLUCOSE BLDC GLUCOMTR-MCNC: 140 MG/DL — HIGH (ref 70–99)
GLUCOSE BLDC GLUCOMTR-MCNC: 154 MG/DL — HIGH (ref 70–99)
GLUCOSE BLDC GLUCOMTR-MCNC: 204 MG/DL — HIGH (ref 70–99)
GLUCOSE SERPL-MCNC: 132 MG/DL — HIGH (ref 70–99)
GLUCOSE SERPL-MCNC: 139 MG/DL — HIGH (ref 70–99)
GLUCOSE SERPL-MCNC: 154 MG/DL — HIGH (ref 70–99)
GLUCOSE SERPL-MCNC: 166 MG/DL — HIGH (ref 70–99)
GLUCOSE SERPL-MCNC: 224 MG/DL — HIGH (ref 70–99)
HCT VFR BLD CALC: 26.6 % — LOW (ref 34.5–45)
HGB BLD-MCNC: 9.2 G/DL — LOW (ref 11.5–15.5)
MAGNESIUM SERPL-MCNC: 1.9 MG/DL — SIGNIFICANT CHANGE UP (ref 1.6–2.6)
MAGNESIUM SERPL-MCNC: 1.9 MG/DL — SIGNIFICANT CHANGE UP (ref 1.6–2.6)
MAGNESIUM SERPL-MCNC: 2 MG/DL — SIGNIFICANT CHANGE UP (ref 1.6–2.6)
MAGNESIUM SERPL-MCNC: 2.1 MG/DL — SIGNIFICANT CHANGE UP (ref 1.6–2.6)
MAGNESIUM SERPL-MCNC: 2.2 MG/DL — SIGNIFICANT CHANGE UP (ref 1.6–2.6)
MCHC RBC-ENTMCNC: 28.2 PG — SIGNIFICANT CHANGE UP (ref 27–34)
MCHC RBC-ENTMCNC: 34.6 GM/DL — SIGNIFICANT CHANGE UP (ref 32–36)
MCV RBC AUTO: 81.6 FL — SIGNIFICANT CHANGE UP (ref 80–100)
NRBC # BLD: 0 /100 WBCS — SIGNIFICANT CHANGE UP (ref 0–0)
NRBC # FLD: 0 K/UL — SIGNIFICANT CHANGE UP (ref 0–0)
OSMOLALITY SERPL: 257 MOSM/KG — LOW (ref 275–295)
OSMOLALITY SERPL: 258 MOSM/KG — LOW (ref 275–295)
OSMOLALITY SERPL: 259 MOSM/KG — LOW (ref 275–295)
OSMOLALITY SERPL: 259 MOSM/KG — LOW (ref 275–295)
OSMOLALITY SERPL: 260 MOSM/KG — LOW (ref 275–295)
OSMOLALITY UR: 241 MOSM/KG — SIGNIFICANT CHANGE UP (ref 50–1200)
OSMOLALITY UR: 354 MOSM/KG — SIGNIFICANT CHANGE UP (ref 50–1200)
OSMOLALITY UR: 518 MOSM/KG — SIGNIFICANT CHANGE UP (ref 50–1200)
OSMOLALITY UR: 521 MOSM/KG — SIGNIFICANT CHANGE UP (ref 50–1200)
OSMOLALITY UR: 560 MOSM/KG — SIGNIFICANT CHANGE UP (ref 50–1200)
PHOSPHATE SERPL-MCNC: 4.1 MG/DL — SIGNIFICANT CHANGE UP (ref 2.5–4.5)
PHOSPHATE SERPL-MCNC: 4.5 MG/DL — SIGNIFICANT CHANGE UP (ref 2.5–4.5)
PHOSPHATE SERPL-MCNC: 4.5 MG/DL — SIGNIFICANT CHANGE UP (ref 2.5–4.5)
PHOSPHATE SERPL-MCNC: 4.8 MG/DL — HIGH (ref 2.5–4.5)
PHOSPHATE SERPL-MCNC: 5 MG/DL — HIGH (ref 2.5–4.5)
PLATELET # BLD AUTO: 194 K/UL — SIGNIFICANT CHANGE UP (ref 150–400)
POTASSIUM SERPL-MCNC: 4.3 MMOL/L — SIGNIFICANT CHANGE UP (ref 3.5–5.3)
POTASSIUM SERPL-MCNC: 4.7 MMOL/L — SIGNIFICANT CHANGE UP (ref 3.5–5.3)
POTASSIUM SERPL-MCNC: 4.9 MMOL/L — SIGNIFICANT CHANGE UP (ref 3.5–5.3)
POTASSIUM SERPL-MCNC: 4.9 MMOL/L — SIGNIFICANT CHANGE UP (ref 3.5–5.3)
POTASSIUM SERPL-SCNC: 4.3 MMOL/L — SIGNIFICANT CHANGE UP (ref 3.5–5.3)
POTASSIUM SERPL-SCNC: 4.7 MMOL/L — SIGNIFICANT CHANGE UP (ref 3.5–5.3)
POTASSIUM SERPL-SCNC: 4.9 MMOL/L — SIGNIFICANT CHANGE UP (ref 3.5–5.3)
POTASSIUM SERPL-SCNC: 4.9 MMOL/L — SIGNIFICANT CHANGE UP (ref 3.5–5.3)
RBC # BLD: 3.26 M/UL — LOW (ref 3.8–5.2)
RBC # FLD: 13.2 % — SIGNIFICANT CHANGE UP (ref 10.3–14.5)
SODIUM SERPL-SCNC: 116 MMOL/L — CRITICAL LOW (ref 135–145)
SODIUM SERPL-SCNC: 117 MMOL/L — CRITICAL LOW (ref 135–145)
SODIUM SERPL-SCNC: 117 MMOL/L — CRITICAL LOW (ref 135–145)
SODIUM SERPL-SCNC: 118 MMOL/L — CRITICAL LOW (ref 135–145)
SODIUM UR-SCNC: 122 MMOL/L — SIGNIFICANT CHANGE UP
SODIUM UR-SCNC: 139 MMOL/L — SIGNIFICANT CHANGE UP
SODIUM UR-SCNC: 51 MMOL/L — SIGNIFICANT CHANGE UP
SPECIMEN SOURCE: SIGNIFICANT CHANGE UP
WBC # BLD: 11.9 K/UL — HIGH (ref 3.8–10.5)
WBC # FLD AUTO: 11.9 K/UL — HIGH (ref 3.8–10.5)

## 2022-11-13 PROCEDURE — 99291 CRITICAL CARE FIRST HOUR: CPT | Mod: GC

## 2022-11-13 PROCEDURE — 99233 SBSQ HOSP IP/OBS HIGH 50: CPT | Mod: GC

## 2022-11-13 RX ORDER — ACETAMINOPHEN 500 MG
1000 TABLET ORAL ONCE
Refills: 0 | Status: COMPLETED | OUTPATIENT
Start: 2022-11-13 | End: 2022-11-13

## 2022-11-13 RX ADMIN — Medication 2: at 12:03

## 2022-11-13 RX ADMIN — CHLORHEXIDINE GLUCONATE 1 APPLICATION(S): 213 SOLUTION TOPICAL at 05:14

## 2022-11-13 RX ADMIN — HEPARIN SODIUM 5000 UNIT(S): 5000 INJECTION INTRAVENOUS; SUBCUTANEOUS at 05:12

## 2022-11-13 RX ADMIN — HEPARIN SODIUM 5000 UNIT(S): 5000 INJECTION INTRAVENOUS; SUBCUTANEOUS at 21:53

## 2022-11-13 RX ADMIN — Medication 1 TABLET(S): at 12:06

## 2022-11-13 RX ADMIN — HEPARIN SODIUM 5000 UNIT(S): 5000 INJECTION INTRAVENOUS; SUBCUTANEOUS at 13:43

## 2022-11-13 RX ADMIN — Medication 400 MILLIGRAM(S): at 23:52

## 2022-11-13 RX ADMIN — SIMVASTATIN 40 MILLIGRAM(S): 20 TABLET, FILM COATED ORAL at 21:53

## 2022-11-13 RX ADMIN — Medication 25 MICROGRAM(S): at 05:12

## 2022-11-13 RX ADMIN — Medication 1 MILLIGRAM(S): at 12:05

## 2022-11-13 NOTE — PROGRESS NOTE ADULT - ASSESSMENT
Pt with hyponatremia, hyperkalemia, and MAGUE on CKD. Pt. with hyponatremia, hyperkalemia, and MAGUE on CKD.

## 2022-11-13 NOTE — PROGRESS NOTE ADULT - PROBLEM SELECTOR PLAN 1
Pt with symptomatic hypo-osmolar euvolemic hyponatremia in the setting of diarrhea and ? urine retention. Pt. received 150 cc bolus of 3% hypertonic saline on 11/11. SNa remains low but improved to 116 on AM labs done on 11/12/22 Pt. received desmopressin and D50 by primary medical/ICU team for high urine output. Pt. with history of hyponatremia in the past (2015). No prior labs available for review. Exact duration of hyponatremia remains unclear. Serum osm is 267, urine osm is 209, and urine Na is 54. Pt with increased UOP after Lao catheter placement, suggesting of urine retention. Pt received D5W on 11/12/22 with concerns for rapid correction. SNa low at 118 on today's labs. Recommend 2% HTS at 30 cc/hr x 4 hours. Monitor SNa q8 hours. Avoid hyptonic fluids. Fluid restriction < 1L. Avoid overcorrection (not more than 6-8 mEq/L in 24 hours). Pt admitted with symptomatic hypo-osmolar euvolemic hyponatremia in the setting of diarrhea and urine retention. Pt. received IV HTS 3% on 11/11. SNa improved to 116 on AM labs done on 11/12/22. Pt. received desmopressin by primary medical/ICU team for high urine output (after Lao catheter placement). Pt. with history of hyponatremia in the past (2015). SNa however WNL on outpatient labs done in May and August 2022. SOsm was low at 267, UOsm was 209, and [place] inappropriately elevated at 54. Pt with increased UOP after Lao catheter placement, suggesting of urine retention. Pt received D5W on 11/12/22 with concerns for rapid correction. SNa low at 118 on today's labs. Recommend 2% HTS at 30 cc/hr x 4 hours. Monitor SNa q8 hours. Avoid hypotonic fluids. Fluid restriction < 1L. Avoid overcorrection (not more than 6-8 mEq/L in 24 hours).

## 2022-11-13 NOTE — PROGRESS NOTE ADULT - SUBJECTIVE AND OBJECTIVE BOX
Alfonso Blackman MD  Internal Medicine   PGY2     OVERNIGHT EVENTS:    SUBJECTIVE: Patient seen and examined at bedside. Patient denies fever, chills, SOB, chest pain, N/V/D.    OBJECTIVE:    VITAL SIGNS:  ICU Vital Signs Last 24 Hrs  T(C): 36.3 (2022 04:00), Max: 36.4 (2022 12:00)  T(F): 97.4 (2022 04:00), Max: 97.6 (2022 12:00)  HR: 63 (2022 06:00) (52 - 74)  BP: 140/68 (2022 06:00) (99/52 - 143/60)  BP(mean): 85 (2022 06:00) (54 - 101)  ABP: --  ABP(mean): --  RR: 20 (2022 06:00) (12 - 21)  SpO2: 100% (2022 06:00) (99% - 100%)    O2 Parameters below as of 2022 04:00  Patient On (Oxygen Delivery Method): room air              -12 @ 07:01  -   @ 07:00  --------------------------------------------------------  IN: 1075 mL / OUT: 1600 mL / NET: -525 mL        =================PHYSICAL EXAM=================    GENERAL: Laying comfortably, NAD  EYES: EOMI, PERRL, no scleral icterus  NECK: No JVD  LUNG: Clear to auscultation bilaterally; No wheeze, crackles or rhonci  HEART: Regular rate and rhythm; No murmurs, rubs, or gallops  ABDOMEN: Soft, Nontender, Nondistended  EXTREMITIES:  No LE edema, 2+ Peripheral Pulses, No clubbing, cyanosis, or edema  PSYCH: AAOx3  NEUROLOGY: non-focal, strength 5/5 in all extremities, sensation intact  SKIN: No rashes or lesions    =================================================    LABS:                        9.2    11.90 )-----------( 194      ( 2022 02:15 )             26.6         118<LL>  |  84<L>  |  20  ----------------------------<  132<H>  4.7   |  20<L>  |  1.08    Ca    8.3<L>      2022 05:30  Phos  4.5     -  Mg     2.10         TPro  7.6  /  Alb  4.2  /  TBili  0.3  /  DBili  x   /  AST  28  /  ALT  29  /  AlkPhos  76  11-11      Urinalysis Basic - ( 2022 02:45 )    Color: Colorless / Appearance: Clear / S.018 / pH: x  Gluc: x / Ketone: Negative  / Bili: Negative / Urobili: <2 mg/dL   Blood: x / Protein: Negative / Nitrite: Negative   Leuk Esterase: Negative / RBC: 16 /HPF / WBC 1 /HPF   Sq Epi: x / Non Sq Epi: 1 /HPF / Bacteria: Negative        CAPILLARY BLOOD GLUCOSE      POCT Blood Glucose.: 145 mg/dL (2022 22:12)    CARDIAC MARKERS ( 2022 02:40 )  x     / x     / 296 U/L / x     / x                   MEDICATIONS:  MEDICATIONS  (STANDING):  atenolol  Tablet 50 milliGRAM(s) Oral daily  chlorhexidine 2% Cloths 1 Application(s) Topical <User Schedule>  dextrose 5%. 1000 milliLiter(s) (50 mL/Hr) IV Continuous <Continuous>  dextrose 5%. 1000 milliLiter(s) (100 mL/Hr) IV Continuous <Continuous>  dextrose 50% Injectable 25 Gram(s) IV Push once  dextrose 50% Injectable 12.5 Gram(s) IV Push once  dextrose 50% Injectable 25 Gram(s) IV Push once  folic acid 1 milliGRAM(s) Oral daily  glucagon  Injectable 1 milliGRAM(s) IntraMuscular once  heparin   Injectable 5000 Unit(s) SubCutaneous every 8 hours  insulin lispro (ADMELOG) corrective regimen sliding scale   SubCutaneous three times a day before meals  insulin lispro (ADMELOG) corrective regimen sliding scale   SubCutaneous at bedtime  levothyroxine 25 MICROGram(s) Oral daily  melatonin 9 milliGRAM(s) Oral at bedtime  multivitamin 1 Tablet(s) Oral daily  simvastatin 40 milliGRAM(s) Oral at bedtime    MEDICATIONS  (PRN):  dextrose Oral Gel 15 Gram(s) Oral once PRN Blood Glucose LESS THAN 70 milliGRAM(s)/deciliter      ALLERGIES:  Allergies    No Known Allergies    Intolerances            RADIOLOGY & ADDITIONAL TESTS: Reviewed. Alfonso Blackman MD  Internal Medicine   PGY2     OVERNIGHT EVENTS:  - no overnight events     SUBJECTIVE: Patient seen and examined at bedside, No acute complaints, at baseline mental status. She is eating.     OBJECTIVE:    VITAL SIGNS:  ICU Vital Signs Last 24 Hrs  T(C): 36.3 (2022 04:00), Max: 36.4 (2022 12:00)  T(F): 97.4 (2022 04:00), Max: 97.6 (2022 12:00)  HR: 63 (2022 06:00) (52 - 74)  BP: 140/68 (2022 06:00) (99/52 - 143/60)  BP(mean): 85 (2022 06:00) (54 - 101)  ABP: --  ABP(mean): --  RR: 20 (2022 06:00) (12 - 21)  SpO2: 100% (2022 06:00) (99% - 100%)    O2 Parameters below as of 2022 04:00  Patient On (Oxygen Delivery Method): room air              -12 @ 07:01  -  13 @ 07:00  --------------------------------------------------------  IN: 1075 mL / OUT: 1600 mL / NET: -525 mL        =================PHYSICAL EXAM=================    Constitutional: NAD; at baseline mental status   HEAD:  Atraumatic, Normocephalic  EYES: EOMI, PERRLA, conjunctiva and sclera clear  NECK: Supple, No JVD, Normal thyroid, no enlarged nodes  NERVOUS SYSTEM:  Alert & Awake.   CHEST/LUNG: B/L good air entry; No rales, rhonchi, or wheezing  HEART: S1S2 normal, no S3, Regular rate and rhythm; No murmurs  ABDOMEN: Soft, Nontender, Nondistended; Bowel sounds present  EXTREMITIES:  2+ Peripheral Pulses, No clubbing, cyanosis, or edema  LYMPH: No lymphadenopathy noted  SKIN: No rashes or lesions    =================================================    LABS:                        9.2    11.90 )-----------( 194      ( 2022 02:15 )             26.6     11-    118<LL>  |  84<L>  |  20  ----------------------------<  132<H>  4.7   |  20<L>  |  1.08    Ca    8.3<L>      2022 05:30  Phos  4.5       Mg     2.10         TPro  7.6  /  Alb  4.2  /  TBili  0.3  /  DBili  x   /  AST  28  /  ALT  29  /  AlkPhos  76  11-      Urinalysis Basic - ( 2022 02:45 )    Color: Colorless / Appearance: Clear / S.018 / pH: x  Gluc: x / Ketone: Negative  / Bili: Negative / Urobili: <2 mg/dL   Blood: x / Protein: Negative / Nitrite: Negative   Leuk Esterase: Negative / RBC: 16 /HPF / WBC 1 /HPF   Sq Epi: x / Non Sq Epi: 1 /HPF / Bacteria: Negative        CAPILLARY BLOOD GLUCOSE      POCT Blood Glucose.: 145 mg/dL (2022 22:12)    CARDIAC MARKERS ( 2022 02:40 )  x     / x     / 296 U/L / x     / x                   MEDICATIONS:  MEDICATIONS  (STANDING):  atenolol  Tablet 50 milliGRAM(s) Oral daily  chlorhexidine 2% Cloths 1 Application(s) Topical <User Schedule>  dextrose 5%. 1000 milliLiter(s) (50 mL/Hr) IV Continuous <Continuous>  dextrose 5%. 1000 milliLiter(s) (100 mL/Hr) IV Continuous <Continuous>  dextrose 50% Injectable 25 Gram(s) IV Push once  dextrose 50% Injectable 12.5 Gram(s) IV Push once  dextrose 50% Injectable 25 Gram(s) IV Push once  folic acid 1 milliGRAM(s) Oral daily  glucagon  Injectable 1 milliGRAM(s) IntraMuscular once  heparin   Injectable 5000 Unit(s) SubCutaneous every 8 hours  insulin lispro (ADMELOG) corrective regimen sliding scale   SubCutaneous three times a day before meals  insulin lispro (ADMELOG) corrective regimen sliding scale   SubCutaneous at bedtime  levothyroxine 25 MICROGram(s) Oral daily  melatonin 9 milliGRAM(s) Oral at bedtime  multivitamin 1 Tablet(s) Oral daily  simvastatin 40 milliGRAM(s) Oral at bedtime    MEDICATIONS  (PRN):  dextrose Oral Gel 15 Gram(s) Oral once PRN Blood Glucose LESS THAN 70 milliGRAM(s)/deciliter      ALLERGIES:  Allergies    No Known Allergies    Intolerances            RADIOLOGY & ADDITIONAL TESTS: Reviewed.

## 2022-11-13 NOTE — PROGRESS NOTE ADULT - SUBJECTIVE AND OBJECTIVE BOX
Problem: Skin Integrity:  Goal: Will show no infection signs and symptoms  Description: Will show no infection signs and symptoms  4/3/2021 1447 by Phoebe Blood RN  Outcome: Ongoing  Note: Will monitor skin and mucous membranes. Will turn patient every 2 hours, monitor for friction and sheering, and change dressings as needed. Will preform skin assessment every shift. Electronically signed by Phoebe Blood RN on 4/3/2021 at 2:47 PM      Problem: Skin Integrity:  Goal: Absence of new skin breakdown  Description: Absence of new skin breakdown  Outcome: Ongoing  Note: J Carlos score assessed. . Repositioned patient Q2H and assessed skin. Educated patient on importance of repositioning to prevent skin issues. Problem: Falls - Risk of:  Goal: Will remain free from falls  Description: Will remain free from falls  4/3/2021 2016 by Corinne Paredes RN  Outcome: Ongoing  Note: Patient educated on fall prevention. Call light is within reach, bed locked in lowest position, personal items within reach, and bed alarm is on. Will round on patient per unit guidelines. Problem: Falls - Risk of:  Goal: Absence of physical injury  Description: Absence of physical injury  Outcome: Ongoing  Note: Pt is free of injury. No injury noted. Fall precautions in place. Call light within reach. Will monitor.         Problem: OXYGENATION/RESPIRATORY FUNCTION  Goal: Patient will maintain patent airway  4/3/2021 2016 by Corinne Paredes RN  Outcome: Ongoing     Problem: OXYGENATION/RESPIRATORY FUNCTION  Goal: Patient will achieve/maintain normal respiratory rate/effort  Description: Respiratory rate and effort will be within normal limits for the patient  Outcome: Ongoing     Problem: HEMODYNAMIC STATUS  Goal: Patient has stable vital signs and fluid balance  4/3/2021 2016 by Corinne Paredes RN  Outcome: Ongoing     Problem: FLUID AND ELECTROLYTE IMBALANCE  Goal: Fluid and electrolyte balance are achieved/maintained  4/3/2021 2016 by Molly Soto RN  Outcome: Ongoing     Problem: ACTIVITY INTOLERANCE/IMPAIRED MOBILITY  Goal: Mobility/activity is maintained at optimum level for patient  Outcome: Ongoing     Problem: Nutrition  Goal: Optimal nutrition therapy  Outcome: Ongoing [facility] DIVISION OF KIDNEY DISEASES AND HYPERTENSION   FOLLOW UP NOTE    --------------------------------------------------------------------------------  HPI: 67-year-old female with history of CKD, HTN, DM, HLD, and iron deficiency anemia presented with weakness and dizziness. Pt being seen for hyponatremia, hyperkalemia and elevated Scr level.     Upon review of labs on [place], SNa was initially 112 on 11/11. Pt received 150 cc bolus of 3% hypertonic saline on 11/11. SNa improved to 116 on early AM labs done on 11/12/22. Pt. received desmopressin and D50 by primary medical team for high urine output.     Pt admitted with diarrhea, decreased PO intake, intermittent dizziness on presentation. As per review of records on daughter phone, Scr was 1.2 on 5/5/22 and was 1.21 on 8/18/22. SNa was WNLa t 140 on 8/18/22. Pt has had hx of hyponatremia in 2015 during her hospitalization at [facility]. As per daughters, pt. was made aware of her kidney disease by her PCP since last ~2 months. Baseline Scr level however unknown and pt has not seen a nephrologist as outpatient in the past. Pt received hypotonic fluids (D5W) on 11/12/22.     Pt seen and examined today in MICU, daughter at bedside. Pt complaining of sleep problems overnight and decreased Po intake. No fever, chest pain, SOB, abdominal pain or HA at time of evaluation.     PAST HISTORY  --------------------------------------------------------------------------------  No significant changes to PMH, PSH, FHx, SHx, unless otherwise noted    ALLERGIES & MEDICATIONS  --------------------------------------------------------------------------------  Allergies  No Known Allergies    Intolerances    Standing Inpatient Medications  atenolol  Tablet 50 milliGRAM(s) Oral daily  chlorhexidine 2% Cloths 1 Application(s) Topical <User Schedule>  dextrose 5%. 1000 milliLiter(s) IV Continuous <Continuous>  dextrose 5%. 1000 milliLiter(s) IV Continuous <Continuous>  dextrose 50% Injectable 25 Gram(s) IV Push once  dextrose 50% Injectable 12.5 Gram(s) IV Push once  dextrose 50% Injectable 25 Gram(s) IV Push once  folic acid 1 milliGRAM(s) Oral daily  glucagon  Injectable 1 milliGRAM(s) IntraMuscular once  heparin   Injectable 5000 Unit(s) SubCutaneous every 8 hours  insulin lispro (ADMELOG) corrective regimen sliding scale   SubCutaneous three times a day before meals  insulin lispro (ADMELOG) corrective regimen sliding scale   SubCutaneous at bedtime  levothyroxine 25 MICROGram(s) Oral daily  melatonin 9 milliGRAM(s) Oral at bedtime  multivitamin 1 Tablet(s) Oral daily  simvastatin 40 milliGRAM(s) Oral at bedtime    PRN Inpatient Medications  dextrose Oral Gel 15 Gram(s) Oral once PRN    REVIEW OF SYSTEMS  --------------------------------------------------------------------------------  Gen: See HPI, generalized weakness,   Skin: No rash  Head/Eyes/Ears/Mouth: No headache  Respiratory: No dyspnea  CV: No chest pain  GI: See HPI, decreased PO intake  : See HPI,  MSK: No joint pain/swelling; no edema  Neuro: See HPI,     All other systems were reviewed and are negative, except as noted.    VITALS/PHYSICAL EXAM  --------------------------------------------------------------------------------  T(C): 36.3 (11-13-22 @ 04:00), Max: 36.4 (11-12-22 @ 12:00)  HR: 66 (11-13-22 @ 09:00) (52 - 66)  BP: 113/50 (11-13-22 @ 09:00) (99/52 - 143/60)  RR: 21 (11-13-22 @ 09:00) (12 - 21)  SpO2: 100% (11-13-22 @ 09:00) (99% - 100%)  Wt(kg): --  Height (cm): 152.4 (11-12-22 @ 02:08)  Weight (kg): 60.2 (11-12-22 @ 02:08)  BMI (kg/m2): 25.9 (11-12-22 @ 02:08)  BSA (m2): 1.57 (11-12-22 @ 02:08)      11-12-22 @ 07:01  -  11-13-22 @ 07:00  --------------------------------------------------------  IN: 1075 mL / OUT: 1600 mL / NET: -525 mL    11-13-22 @ 07:01  -  11-13-22 @ 09:44  --------------------------------------------------------  IN: 0 mL / OUT: 75 mL / NET: -75 mL      Physical Exam:  Gen: resting  HEENT: MMM  Pulm: CTA B/L  CV: S1S2+  Abd: Soft, +BS   Ext: No LE edema B/L  Neuro: Awake  : Lao catheter with dark urine+  Skin: Warm and dry    LABS/STUDIES  --------------------------------------------------------------------------------              9.2    11.90 >-----------<  194      [11-13-22 @ 02:15]              26.6     118  |  84  |  20  ----------------------------<  132      [11-13-22 @ 05:30]  4.7   |  20  |  1.08        Ca     8.3     [11-13-22 @ 05:30]      Mg     2.10     [11-13-22 @ 05:30]      Phos  4.5     [11-13-22 @ 05:30]          [11-12-22 @ 02:40]  Serum Osmolality 260      [11-13-22 @ 05:30]    Creatinine Trend:  SCr 1.08 [11-13 @ 05:30]  SCr 1.06 [11-13 @ 02:15]  SCr 1.07 [11-12 @ 22:15]  SCr 1.12 [11-12 @ 18:26]  SCr 1.20 [11-12 @ 14:15]    Urinalysis - [11-12-22 @ 02:45]      Color Colorless / Appearance Clear / SG 1.018 / pH 6.5      Gluc Negative / Ketone Negative  / Bili Negative / Urobili <2 mg/dL       Blood Small / Protein Negative / Leuk Est Negative / Nitrite Negative      RBC 16 / WBC 1 / Hyaline  / Gran  / Sq Epi  / Non Sq Epi 1 / Bacteria Negative    Urine Creatinine 11      [11-12-22 @ 02:45]  Urine Sodium 122      [11-13-22 @ 05:30]  Urine Urea Nitrogen 163.8      [11-12-22 @ 02:45]  Urine Potassium 8.8      [11-11-22 @ 23:40]  Urine Chloride 56      [11-11-22 @ 23:40]  Urine Osmolality 560      [11-13-22 @ 05:30]    HCV 0.06, Nonreact      [11-12-22 @ 02:40] [facility] DIVISION OF KIDNEY DISEASES AND HYPERTENSION   FOLLOW UP NOTE    --------------------------------------------------------------------------------  HPI: 67-year-old female with history of CKD, HTN, DM, HLD, and iron deficiency anemia presented with weakness and dizziness. Pt. being seen for hyponatremia, hyperkalemia and elevated Scr level.     Upon review of labs on [place], SNa was initially 112 on 11/11. Pt received 150 cc bolus of 3% hypertonic saline on 11/11. SNa improved to 116 on early AM labs done on 11/12/22. Pt. received desmopressin by primary medical team for high urine output.     Pt. with history of diarrhea, decreased PO intake, intermittent dizziness on presentation. As per review of previous labs (provided by daughter), Scr was 1.2 on 5/5/22 and was 1.21 on 8/18/22. SNa was WNL at 140 on 8/18/22. Pt has had history of hyponatremia in 2015 during hospitalization at [facility]. As per daughters, pt. was made aware of her kidney disease by her PCP since last ~2 months. Baseline Scr level however unknown and pt has not seen a nephrologist as outpatient in the past. Pt received hypotonic fluids (D5W) on 11/12/22 for overcorrection of SNa.     Pt seen and examined today in MICU, daughter at bedside. Pt complaining of sleep problems overnight and decreased oral intake. No fever, chest pain, SOB, abdominal pain or HA at time of evaluation.     PAST HISTORY  --------------------------------------------------------------------------------  No significant changes to PMH, PSH, FHx, SHx, unless otherwise noted    ALLERGIES & MEDICATIONS  --------------------------------------------------------------------------------  Allergies  No Known Allergies    Intolerances    Standing Inpatient Medications  atenolol  Tablet 50 milliGRAM(s) Oral daily  chlorhexidine 2% Cloths 1 Application(s) Topical <User Schedule>  dextrose 5%. 1000 milliLiter(s) IV Continuous <Continuous>  dextrose 5%. 1000 milliLiter(s) IV Continuous <Continuous>  dextrose 50% Injectable 25 Gram(s) IV Push once  dextrose 50% Injectable 12.5 Gram(s) IV Push once  dextrose 50% Injectable 25 Gram(s) IV Push once  folic acid 1 milliGRAM(s) Oral daily  glucagon  Injectable 1 milliGRAM(s) IntraMuscular once  heparin   Injectable 5000 Unit(s) SubCutaneous every 8 hours  insulin lispro (ADMELOG) corrective regimen sliding scale   SubCutaneous three times a day before meals  insulin lispro (ADMELOG) corrective regimen sliding scale   SubCutaneous at bedtime  levothyroxine 25 MICROGram(s) Oral daily  melatonin 9 milliGRAM(s) Oral at bedtime  multivitamin 1 Tablet(s) Oral daily  simvastatin 40 milliGRAM(s) Oral at bedtime    PRN Inpatient Medications  dextrose Oral Gel 15 Gram(s) Oral once PRN    REVIEW OF SYSTEMS  --------------------------------------------------------------------------------  Gen: See HPI, generalized weakness+   Skin: No rash  Head/Eyes/Ears/Mouth: No headache  Respiratory: No dyspnea  CV: No chest pain  GI: See HPI, decreased PO intake  : See HPI,  MSK: No LE edema  Neuro: See HPI     All other systems were reviewed and are negative, except as noted.    VITALS/PHYSICAL EXAM  --------------------------------------------------------------------------------  T(C): 36.3 (11-13-22 @ 04:00), Max: 36.4 (11-12-22 @ 12:00)  HR: 66 (11-13-22 @ 09:00) (52 - 66)  BP: 113/50 (11-13-22 @ 09:00) (99/52 - 143/60)  RR: 21 (11-13-22 @ 09:00) (12 - 21)  SpO2: 100% (11-13-22 @ 09:00) (99% - 100%)  Wt(kg): --  Height (cm): 152.4 (11-12-22 @ 02:08)  Weight (kg): 60.2 (11-12-22 @ 02:08)  BMI (kg/m2): 25.9 (11-12-22 @ 02:08)  BSA (m2): 1.57 (11-12-22 @ 02:08)    11-12-22 @ 07:01  -  11-13-22 @ 07:00  --------------------------------------------------------  IN: 1075 mL / OUT: 1600 mL / NET: -525 mL    11-13-22 @ 07:01  -  11-13-22 @ 09:44  --------------------------------------------------------  IN: 0 mL / OUT: 75 mL / NET: -75 mL    Physical Exam:    Gen: resting  HEENT: MMM  Pulm: CTA B/L  CV: S1S2+  Abd: Soft, +BS   Ext: No LE edema B/L  Neuro: Awake, alert  : Lao catheter with dark urine+  Skin: Warm and dry    LABS/STUDIES  --------------------------------------------------------------------------------              9.2    11.90 >-----------<  194      [11-13-22 @ 02:15]              26.6     118  |  84  |  20  ----------------------------<  132      [11-13-22 @ 05:30]  4.7   |  20  |  1.08        Ca     8.3     [11-13-22 @ 05:30]      Mg     2.10     [11-13-22 @ 05:30]      Phos  4.5     [11-13-22 @ 05:30]          [11-12-22 @ 02:40]  Serum Osmolality 260      [11-13-22 @ 05:30]    Creatinine Trend:  SCr 1.08 [11-13 @ 05:30]  SCr 1.06 [11-13 @ 02:15]  SCr 1.07 [11-12 @ 22:15]  SCr 1.12 [11-12 @ 18:26]  SCr 1.20 [11-12 @ 14:15]    Urinalysis - [11-12-22 @ 02:45]      Color Colorless / Appearance Clear / SG 1.018 / pH 6.5      Gluc Negative / Ketone Negative  / Bili Negative / Urobili <2 mg/dL       Blood Small / Protein Negative / Leuk Est Negative / Nitrite Negative      RBC 16 / WBC 1 / Hyaline  / Gran  / Sq Epi  / Non Sq Epi 1 / Bacteria Negative    Urine Creatinine 11      [11-12-22 @ 02:45]  Urine Sodium 122      [11-13-22 @ 05:30]  Urine Urea Nitrogen 163.8      [11-12-22 @ 02:45]  Urine Potassium 8.8      [11-11-22 @ 23:40]  Urine Chloride 56      [11-11-22 @ 23:40]  Urine Osmolality 560      [11-13-22 @ 05:30]    HCV 0.06, Nonreact      [11-12-22 @ 02:40]

## 2022-11-13 NOTE — PROGRESS NOTE ADULT - ATTENDING COMMENTS
Pt. with hyponatremia, hyperkalemia and MAGUE on CKD. Scr decreased to 1.08 and SNa low/stable at 118 today. Assessment and plan for hyponatremia, hyperkalemia and AMGUE on CKD as outlined above. Monitor labs and urine output. Avoid any potential nephrotoxins. Dose medications as per eGFR. Avoid overcorrection of SNa.

## 2022-11-13 NOTE — PROGRESS NOTE ADULT - ASSESSMENT
67y female PMHx HTN, HLD, DM2, iron deficiency anemia (s/p iron transfusion yesterday) presenting with generalized weakness, dizziness and lower extremity heaviness admitted to the MICU for hyponatremia.     PLAN  =====Neurologic=====  Patient is AOx3 at baseline      =====Pulmonary=====  Patient breathing comfortably on room air. No active issues. Reported SOB resolved, will monitor.     =====Cardiovascular=====  Patient does not currently require vasopressors and is normotensive. No active issues    =====GI=====  No acute issues     #Diet  - Soft and bite sized (with 1L fluid restriction)       =====Renal/=====  #Hyponatremia (Na 112, 11/12)  Likely 2/2 SIADH   - Started on 2% HTN 30cc for 4 hours on 11/13 as per nephro   - BMP q4h, Serum Sosm/Uosm   - F/u Nephro reccs   - strict monitor I/Os   - Avoid overcorrection last Na 116 on 11/13. Aim for 6-8mEq in 24 hours   - On fluid restriction diet.      =====Endocrine=====  #DM2  - ISS   - On a diet     #Hypothyroidism  - C/w current dose of synthroid    =====Infectious Disease=====  Patient is afebrile. Patient has leukocytosis, unlikely from infectious etiology. Likely 2/2 inflammation and recent diarrhea.     =====Heme/Onc=====  #DVT Prophylaxis  - Heparin 5000 subq     #Iron deficiency anemia  - Hgb stable , no active signs of bleeding   - Patient gets IV Iron outpatient as per daughter. In addition, she has tried oral Iron in past but did not tolerate it.     =====Ethics=====  FULL CODE  Speaks [place]

## 2022-11-13 NOTE — PROGRESS NOTE ADULT - ATTENDING COMMENTS
67 F with htn, hld, DM2 here with weakness, ?seizure found to have acute hyponatremia and hyperkalemia    no seizures this AM, no shaking  sodium decreased, started on 2%    # acute hyponatremia  # acute metabolic encephalopathy  # MAGUE  - unclear etiology, could be due to urinary retention vs medication use  - sodium dropped again off d5, will follow up after 4 hours of nacl 2%  - serial BMPs, urine osms/na  - monitor uop for MAGUE  - patient more alert    high risk for decompensation given acute hyponatremia    Critically ill patient requiring frequent bedside visits with therapy changes. 67 F with htn, hld, DM2 here with weakness, ?seizure found to have acute hyponatremia and hyperkalemia    no seizures this AM, no shaking  sodium decreased, started on 2%    # acute hyponatremia  # acute metabolic encephalopathy  # MAGUE  - unclear etiology, could be due to urinary retention vs medication use  - sodium dropped again off d5, will follow up after 4 hours of nacl 2%  - serial BMPs, urine osms/na  - monitor uop for MAGUE  - patient more alert, suspect metabolic encephalopathy     high risk for decompensation given acute hyponatremia    Critically ill patient requiring frequent bedside visits with therapy changes.

## 2022-11-13 NOTE — PROGRESS NOTE ADULT - PROBLEM SELECTOR PLAN 3
Pt. with history of CKD of unknown duration.  As per review of records on daughter phone, Scr was 1.2 on 5/5/22 and was 1.21 on 8/18/22. [facility] [place], Scr was 1.53 on 11/11. Pt with increased UOP after Lao catheter placement, suggesting of urine retention. Pt. with MAGUE on CKD in setting of recent diarrhea and likely urine retention. SCr improved to 1.08 today. Check renal US. Monitor labs and urine output. Avoid nephrotoxins. Dose medications as per eGFR. Pt. with history of CKD. As per review of previous outpatient labs, Scr was elevated at 1.2 on 5/5/22 and was 1.21 on 8/18/22. [facility] [place], Scr was 1.53 on 11/11. Pt with increased UOP after Lao catheter placement, suggesting of urine retention. Pt. with MAGUE on CKD in setting of recent diarrhea and urine retention. Scr improved to 1.08 today. Check renal US. Monitor labs and urine output. Avoid nephrotoxins. Dose medications as per eGFR.

## 2022-11-14 LAB
ANION GAP SERPL CALC-SCNC: 11 MMOL/L — SIGNIFICANT CHANGE UP (ref 7–14)
ANION GAP SERPL CALC-SCNC: 12 MMOL/L — SIGNIFICANT CHANGE UP (ref 7–14)
ANION GAP SERPL CALC-SCNC: 13 MMOL/L — SIGNIFICANT CHANGE UP (ref 7–14)
ANION GAP SERPL CALC-SCNC: 14 MMOL/L — SIGNIFICANT CHANGE UP (ref 7–14)
APPEARANCE UR: ABNORMAL
BACTERIA # UR AUTO: NEGATIVE — SIGNIFICANT CHANGE UP
BILIRUB UR-MCNC: NEGATIVE — SIGNIFICANT CHANGE UP
BUN SERPL-MCNC: 14 MG/DL — SIGNIFICANT CHANGE UP (ref 7–23)
BUN SERPL-MCNC: 15 MG/DL — SIGNIFICANT CHANGE UP (ref 7–23)
BUN SERPL-MCNC: 16 MG/DL — SIGNIFICANT CHANGE UP (ref 7–23)
BUN SERPL-MCNC: 19 MG/DL — SIGNIFICANT CHANGE UP (ref 7–23)
BUN SERPL-MCNC: 20 MG/DL — SIGNIFICANT CHANGE UP (ref 7–23)
CALCIUM SERPL-MCNC: 8.3 MG/DL — LOW (ref 8.4–10.5)
CALCIUM SERPL-MCNC: 8.4 MG/DL — SIGNIFICANT CHANGE UP (ref 8.4–10.5)
CALCIUM SERPL-MCNC: 8.4 MG/DL — SIGNIFICANT CHANGE UP (ref 8.4–10.5)
CALCIUM SERPL-MCNC: 8.5 MG/DL — SIGNIFICANT CHANGE UP (ref 8.4–10.5)
CALCIUM SERPL-MCNC: 8.6 MG/DL — SIGNIFICANT CHANGE UP (ref 8.4–10.5)
CHLORIDE SERPL-SCNC: 86 MMOL/L — LOW (ref 98–107)
CHLORIDE SERPL-SCNC: 89 MMOL/L — LOW (ref 98–107)
CHLORIDE SERPL-SCNC: 92 MMOL/L — LOW (ref 98–107)
CHLORIDE SERPL-SCNC: 93 MMOL/L — LOW (ref 98–107)
CHLORIDE SERPL-SCNC: 94 MMOL/L — LOW (ref 98–107)
CHLORIDE SERPL-SCNC: 94 MMOL/L — LOW (ref 98–107)
CO2 SERPL-SCNC: 18 MMOL/L — LOW (ref 22–31)
CO2 SERPL-SCNC: 18 MMOL/L — LOW (ref 22–31)
CO2 SERPL-SCNC: 20 MMOL/L — LOW (ref 22–31)
CO2 SERPL-SCNC: 20 MMOL/L — LOW (ref 22–31)
CO2 SERPL-SCNC: 21 MMOL/L — LOW (ref 22–31)
COLOR SPEC: ABNORMAL
CORTIS F PM SERPL-MCNC: 3.6 UG/DL — SIGNIFICANT CHANGE UP (ref 2.7–10.5)
CREAT SERPL-MCNC: 1.09 MG/DL — SIGNIFICANT CHANGE UP (ref 0.5–1.3)
CREAT SERPL-MCNC: 1.1 MG/DL — SIGNIFICANT CHANGE UP (ref 0.5–1.3)
CREAT SERPL-MCNC: 1.11 MG/DL — SIGNIFICANT CHANGE UP (ref 0.5–1.3)
CREAT SERPL-MCNC: 1.11 MG/DL — SIGNIFICANT CHANGE UP (ref 0.5–1.3)
CREAT SERPL-MCNC: 1.15 MG/DL — SIGNIFICANT CHANGE UP (ref 0.5–1.3)
DIFF PNL FLD: ABNORMAL
EGFR: 52 ML/MIN/1.73M2 — LOW
EGFR: 54 ML/MIN/1.73M2 — LOW
EGFR: 54 ML/MIN/1.73M2 — LOW
EGFR: 55 ML/MIN/1.73M2 — LOW
EGFR: 56 ML/MIN/1.73M2 — LOW
EGFR: 60 ML/MIN/1.73M2 — SIGNIFICANT CHANGE UP
EPI CELLS # UR: 3 /HPF — SIGNIFICANT CHANGE UP (ref 0–5)
GLUCOSE BLDC GLUCOMTR-MCNC: 167 MG/DL — HIGH (ref 70–99)
GLUCOSE BLDC GLUCOMTR-MCNC: 174 MG/DL — HIGH (ref 70–99)
GLUCOSE BLDC GLUCOMTR-MCNC: 212 MG/DL — HIGH (ref 70–99)
GLUCOSE BLDC GLUCOMTR-MCNC: 220 MG/DL — HIGH (ref 70–99)
GLUCOSE SERPL-MCNC: 159 MG/DL — HIGH (ref 70–99)
GLUCOSE SERPL-MCNC: 174 MG/DL — HIGH (ref 70–99)
GLUCOSE SERPL-MCNC: 219 MG/DL — HIGH (ref 70–99)
GLUCOSE SERPL-MCNC: 227 MG/DL — HIGH (ref 70–99)
GLUCOSE SERPL-MCNC: 235 MG/DL — HIGH (ref 70–99)
GLUCOSE UR QL: NEGATIVE — SIGNIFICANT CHANGE UP
HCT VFR BLD CALC: 28.7 % — LOW (ref 34.5–45)
HGB BLD-MCNC: 9.8 G/DL — LOW (ref 11.5–15.5)
HYALINE CASTS # UR AUTO: 1 /LPF — SIGNIFICANT CHANGE UP (ref 0–7)
KETONES UR-MCNC: NEGATIVE — SIGNIFICANT CHANGE UP
LEUKOCYTE ESTERASE UR-ACNC: ABNORMAL
MAGNESIUM SERPL-MCNC: 2 MG/DL — SIGNIFICANT CHANGE UP (ref 1.6–2.6)
MAGNESIUM SERPL-MCNC: 2 MG/DL — SIGNIFICANT CHANGE UP (ref 1.6–2.6)
MAGNESIUM SERPL-MCNC: 2.1 MG/DL — SIGNIFICANT CHANGE UP (ref 1.6–2.6)
MAGNESIUM SERPL-MCNC: 2.1 MG/DL — SIGNIFICANT CHANGE UP (ref 1.6–2.6)
MCHC RBC-ENTMCNC: 28.2 PG — SIGNIFICANT CHANGE UP (ref 27–34)
MCHC RBC-ENTMCNC: 34.1 GM/DL — SIGNIFICANT CHANGE UP (ref 32–36)
MCV RBC AUTO: 82.5 FL — SIGNIFICANT CHANGE UP (ref 80–100)
NITRITE UR-MCNC: NEGATIVE — SIGNIFICANT CHANGE UP
NRBC # BLD: 0 /100 WBCS — SIGNIFICANT CHANGE UP (ref 0–0)
NRBC # FLD: 0 K/UL — SIGNIFICANT CHANGE UP (ref 0–0)
OSMOLALITY SERPL: 269 MOSM/KG — LOW (ref 275–295)
OSMOLALITY SERPL: 275 MOSM/KG — SIGNIFICANT CHANGE UP (ref 275–295)
OSMOLALITY SERPL: 281 MOSM/KG — SIGNIFICANT CHANGE UP (ref 275–295)
OSMOLALITY UR: 164 MOSM/KG — SIGNIFICANT CHANGE UP (ref 50–1200)
OSMOLALITY UR: 194 MOSM/KG — SIGNIFICANT CHANGE UP (ref 50–1200)
OSMOLALITY UR: 221 MOSM/KG — SIGNIFICANT CHANGE UP (ref 50–1200)
PH UR: 6.5 — SIGNIFICANT CHANGE UP (ref 5–8)
PHOSPHATE SERPL-MCNC: 4.2 MG/DL — SIGNIFICANT CHANGE UP (ref 2.5–4.5)
PHOSPHATE SERPL-MCNC: 4.4 MG/DL — SIGNIFICANT CHANGE UP (ref 2.5–4.5)
PLATELET # BLD AUTO: 215 K/UL — SIGNIFICANT CHANGE UP (ref 150–400)
POTASSIUM SERPL-MCNC: 4.6 MMOL/L — SIGNIFICANT CHANGE UP (ref 3.5–5.3)
POTASSIUM SERPL-MCNC: 4.7 MMOL/L — SIGNIFICANT CHANGE UP (ref 3.5–5.3)
POTASSIUM SERPL-MCNC: 4.9 MMOL/L — SIGNIFICANT CHANGE UP (ref 3.5–5.3)
POTASSIUM SERPL-MCNC: 4.9 MMOL/L — SIGNIFICANT CHANGE UP (ref 3.5–5.3)
POTASSIUM SERPL-MCNC: 5 MMOL/L — SIGNIFICANT CHANGE UP (ref 3.5–5.3)
POTASSIUM SERPL-MCNC: 5.1 MMOL/L — SIGNIFICANT CHANGE UP (ref 3.5–5.3)
POTASSIUM SERPL-SCNC: 4.6 MMOL/L — SIGNIFICANT CHANGE UP (ref 3.5–5.3)
POTASSIUM SERPL-SCNC: 4.7 MMOL/L — SIGNIFICANT CHANGE UP (ref 3.5–5.3)
POTASSIUM SERPL-SCNC: 4.9 MMOL/L — SIGNIFICANT CHANGE UP (ref 3.5–5.3)
POTASSIUM SERPL-SCNC: 4.9 MMOL/L — SIGNIFICANT CHANGE UP (ref 3.5–5.3)
POTASSIUM SERPL-SCNC: 5 MMOL/L — SIGNIFICANT CHANGE UP (ref 3.5–5.3)
POTASSIUM SERPL-SCNC: 5.1 MMOL/L — SIGNIFICANT CHANGE UP (ref 3.5–5.3)
PROT UR-MCNC: ABNORMAL
RBC # BLD: 3.48 M/UL — LOW (ref 3.8–5.2)
RBC # FLD: 13.2 % — SIGNIFICANT CHANGE UP (ref 10.3–14.5)
RBC CASTS # UR COMP ASSIST: 470 /HPF — HIGH (ref 0–4)
SODIUM SERPL-SCNC: 116 MMOL/L — CRITICAL LOW (ref 135–145)
SODIUM SERPL-SCNC: 120 MMOL/L — CRITICAL LOW (ref 135–145)
SODIUM SERPL-SCNC: 124 MMOL/L — LOW (ref 135–145)
SODIUM SERPL-SCNC: 124 MMOL/L — LOW (ref 135–145)
SODIUM SERPL-SCNC: 125 MMOL/L — LOW (ref 135–145)
SODIUM SERPL-SCNC: 127 MMOL/L — LOW (ref 135–145)
SODIUM UR-SCNC: 36 MMOL/L — SIGNIFICANT CHANGE UP
SODIUM UR-SCNC: 40 MMOL/L — SIGNIFICANT CHANGE UP
SP GR SPEC: 1.01 — LOW (ref 1.01–1.05)
TRIGL SERPL-MCNC: 212 MG/DL — HIGH
URATE SERPL-MCNC: 4.4 MG/DL — SIGNIFICANT CHANGE UP (ref 2.5–7)
UROBILINOGEN FLD QL: SIGNIFICANT CHANGE UP
WBC # BLD: 11.19 K/UL — HIGH (ref 3.8–10.5)
WBC # FLD AUTO: 11.19 K/UL — HIGH (ref 3.8–10.5)
WBC UR QL: 9 /HPF — HIGH (ref 0–5)

## 2022-11-14 PROCEDURE — 76770 US EXAM ABDO BACK WALL COMP: CPT | Mod: 26

## 2022-11-14 PROCEDURE — 99232 SBSQ HOSP IP/OBS MODERATE 35: CPT

## 2022-11-14 PROCEDURE — 99233 SBSQ HOSP IP/OBS HIGH 50: CPT | Mod: GC

## 2022-11-14 RX ORDER — DESMOPRESSIN ACETATE 0.1 MG/1
2 TABLET ORAL ONCE
Refills: 0 | Status: COMPLETED | OUTPATIENT
Start: 2022-11-14 | End: 2022-11-14

## 2022-11-14 RX ORDER — SODIUM CHLORIDE 5 G/100ML
500 INJECTION, SOLUTION INTRAVENOUS
Refills: 0 | Status: DISCONTINUED | OUTPATIENT
Start: 2022-11-14 | End: 2022-11-14

## 2022-11-14 RX ORDER — ROPINIROLE 8 MG/1
1 TABLET, FILM COATED, EXTENDED RELEASE ORAL AT BEDTIME
Refills: 0 | Status: DISCONTINUED | OUTPATIENT
Start: 2022-11-14 | End: 2022-11-15

## 2022-11-14 RX ADMIN — SIMVASTATIN 40 MILLIGRAM(S): 20 TABLET, FILM COATED ORAL at 22:12

## 2022-11-14 RX ADMIN — DESMOPRESSIN ACETATE 2 MICROGRAM(S): 0.1 TABLET ORAL at 15:03

## 2022-11-14 RX ADMIN — Medication 2: at 12:24

## 2022-11-14 RX ADMIN — HEPARIN SODIUM 5000 UNIT(S): 5000 INJECTION INTRAVENOUS; SUBCUTANEOUS at 14:06

## 2022-11-14 RX ADMIN — CHLORHEXIDINE GLUCONATE 1 APPLICATION(S): 213 SOLUTION TOPICAL at 05:17

## 2022-11-14 RX ADMIN — Medication 1000 MILLIGRAM(S): at 00:30

## 2022-11-14 RX ADMIN — ATENOLOL 50 MILLIGRAM(S): 25 TABLET ORAL at 08:13

## 2022-11-14 RX ADMIN — Medication 25 MICROGRAM(S): at 05:16

## 2022-11-14 RX ADMIN — Medication 1: at 08:13

## 2022-11-14 RX ADMIN — HEPARIN SODIUM 5000 UNIT(S): 5000 INJECTION INTRAVENOUS; SUBCUTANEOUS at 05:19

## 2022-11-14 RX ADMIN — SODIUM CHLORIDE 30 MILLILITER(S): 5 INJECTION, SOLUTION INTRAVENOUS at 00:55

## 2022-11-14 RX ADMIN — Medication 1 TABLET(S): at 12:24

## 2022-11-14 RX ADMIN — Medication 1 MILLIGRAM(S): at 12:24

## 2022-11-14 RX ADMIN — Medication 1: at 17:26

## 2022-11-14 NOTE — PROGRESS NOTE ADULT - ATTENDING COMMENTS
67 year old woman with HTN, DM, HLD and RLS  presented with weakness, AMS and questionable seizures found to have hyponatremia to 112, sodium now corrected to 120 after 3% infusion currently not on any     - unclear etiology, could be due to urinary retention and medication effect  - serial BMPs, urine osms/na  - MAGUE improving   - patient is alert and awake   - will restart Ropinirole for RLS    Critically ill patient requiring frequent bedside visits with therapy changes.

## 2022-11-14 NOTE — PROGRESS NOTE ADULT - PROBLEM SELECTOR PLAN 1
Pt admitted with symptomatic hypo-osmolar euvolemic hyponatremia in the setting of diarrhea and urine retention. Pt. received IV HTS 3% on 11/11. SNa improved to 116 on AM labs done on 11/12/22. Pt. received desmopressin by primary medical/ICU team for high urine output (after Lao catheter placement). Pt. with history of hyponatremia in the past (2015). SNa however WNL on outpatient labs done in May and August 2022. SOsm was low at 267, UOsm was 209, and Tyler inappropriately elevated at 54. Pt with increased UOP after Lao catheter placement, suggesting of urine retention. Pt received D5W on 11/12/22 with concerns for rapid correction. SNa was low at 118 on 11/13/22. Received  2% HTS at 30 cc/hr x 4 hours. Sna is low/stable at 120 today (11/14/22). Recommend 2%HTS 30cc/hr for 4 hrs today. Monitor SNa q8 hours. Avoid hypotonic fluids. Fluid restriction < 1L. Avoid overcorrection (not more than 6-8 mEq/L in 24 hours). Pt admitted with symptomatic hypo-osmolar euvolemic hyponatremia in the setting of diarrhea and urine retention. Pt. received IV HTS 3% on 11/11. SNa improved to 116 on AM labs done on 11/12/22. Pt. received desmopressin by primary medical/ICU team for high urine output (after Lao catheter placement). Pt. with history of hyponatremia in the past (2015). SNa however WNL on outpatient labs done in May and August 2022. SOsm was low at 267, UOsm was 209, and Tyler inappropriately elevated at 54. Pt with increased UOP after Lao catheter placement, suggesting of urine retention. Pt received D5W on 11/12/22 with concerns for rapid correction. SNa was low at 118 on 11/13/22. Received  2% HTS at 30 cc/hr x 4 hours. Sna is low/stable at 120 today (11/14/22). Repeat SNa improved to 125 at 10AM. Recommend repeat labs in 6 hrs. Monitor SNa q8 hours. Avoid hypotonic fluids. Fluid restriction < 1L. Avoid overcorrection (not more than 6-8 mEq/L in 24 hours).

## 2022-11-14 NOTE — CHART NOTE - [id number]_GEN_A_CORE
MICU Transfer Note  ---------------------------    Transfer from: MICU  Transfer to:  (  ) Medicine    (  ) Telemetry    (  ) RCU    (  ) Palliative    (  ) Stroke Unit    (  ) _______________  [facility]      For Follow-Up:  [ ]   [ ]    OBJECTIVE --  Vital Signs Last 24 Hrs  T(C): 36.1 (14 Nov 2022 08:00), Max: 36.5 (13 Nov 2022 12:00)  T(F): 96.9 (14 Nov 2022 08:00), Max: 97.7 (13 Nov 2022 12:00)  HR: 57 (14 Nov 2022 11:00) (56 - 85)  BP: 139/106 (14 Nov 2022 11:00) (97/63 - 156/133)  BP(mean): 115 (14 Nov 2022 11:00) (60 - 140)  RR: 15 (14 Nov 2022 11:00) (10 - 22)  SpO2: 100% (14 Nov 2022 11:00) (96% - 100%)    Parameters below as of 14 Nov 2022 11:00  Patient On (Oxygen Delivery Method): room air        I&O's Summary    13 Nov 2022 07:01  -  14 Nov 2022 07:00  --------------------------------------------------------  IN: 980 mL / OUT: 2010 mL / NET: -1030 mL    14 Nov 2022 07:01  -  14 Nov 2022 11:51  --------------------------------------------------------  IN: 0 mL / OUT: 300 mL / NET: -300 mL        MEDICATIONS  (STANDING):  atenolol  Tablet 50 milliGRAM(s) Oral daily  chlorhexidine 2% Cloths 1 Application(s) Topical <User Schedule>  dextrose 5%. 1000 milliLiter(s) (50 mL/Hr) IV Continuous <Continuous>  dextrose 5%. 1000 milliLiter(s) (100 mL/Hr) IV Continuous <Continuous>  dextrose 50% Injectable 25 Gram(s) IV Push once  dextrose 50% Injectable 12.5 Gram(s) IV Push once  dextrose 50% Injectable 25 Gram(s) IV Push once  folic acid 1 milliGRAM(s) Oral daily  glucagon  Injectable 1 milliGRAM(s) IntraMuscular once  heparin   Injectable 5000 Unit(s) SubCutaneous every 8 hours  insulin lispro (ADMELOG) corrective regimen sliding scale   SubCutaneous three times a day before meals  insulin lispro (ADMELOG) corrective regimen sliding scale   SubCutaneous at bedtime  levothyroxine 25 MICROGram(s) Oral daily  melatonin 9 milliGRAM(s) Oral at bedtime  multivitamin 1 Tablet(s) Oral daily  rOPINIRole 1 milliGRAM(s) Oral at bedtime  simvastatin 40 milliGRAM(s) Oral at bedtime    MEDICATIONS  (PRN):  dextrose Oral Gel 15 Gram(s) Oral once PRN Blood Glucose LESS THAN 70 milliGRAM(s)/deciliter        LABS                                            9.8                   Neurophils% (auto):   x      (11-14 @ 05:19):    11.19)-----------(215          Lymphocytes% (auto):  x                                             28.7                   Eosinphils% (auto):   x        Manual%: Neutrophils x    ; Lymphocytes x    ; Eosinophils x    ; Bands%: x    ; Blasts x                                    125    |  93     |  14                  Calcium: 8.4   / iCa: x      (11-14 @ 10:02)    ----------------------------<  174       Magnesium: 2.00                             5.1     |  21     |  1.09             Phosphorous: 4.2                ASSESSMENT & PLAN: MICU Transfer Note  ---------------------------    Transfer from: MICU  Transfer to:  ( X ) Medicine    (  ) Telemetry    (  ) RCU    (  ) Palliative    (  ) Stroke Unit    (  ) _______________  Accepting Physician:      MICU COURSE      For Follow-Up:  [ ] BMP q8  [ ] F/u renal U/S  [ ] Renal recs    OBJECTIVE --  Vital Signs Last 24 Hrs  T(C): 36.1 (14 Nov 2022 08:00), Max: 36.5 (13 Nov 2022 12:00)  T(F): 96.9 (14 Nov 2022 08:00), Max: 97.7 (13 Nov 2022 12:00)  HR: 57 (14 Nov 2022 11:00) (56 - 85)  BP: 139/106 (14 Nov 2022 11:00) (97/63 - 156/133)  BP(mean): 115 (14 Nov 2022 11:00) (60 - 140)  RR: 15 (14 Nov 2022 11:00) (10 - 22)  SpO2: 100% (14 Nov 2022 11:00) (96% - 100%)    Parameters below as of 14 Nov 2022 11:00  Patient On (Oxygen Delivery Method): room air        I&O's Summary    13 Nov 2022 07:01  -  14 Nov 2022 07:00  --------------------------------------------------------  IN: 980 mL / OUT: 2010 mL / NET: -1030 mL    14 Nov 2022 07:01  -  14 Nov 2022 11:51  --------------------------------------------------------  IN: 0 mL / OUT: 300 mL / NET: -300 mL        MEDICATIONS  (STANDING):  atenolol  Tablet 50 milliGRAM(s) Oral daily  chlorhexidine 2% Cloths 1 Application(s) Topical <User Schedule>  dextrose 5%. 1000 milliLiter(s) (50 mL/Hr) IV Continuous <Continuous>  dextrose 5%. 1000 milliLiter(s) (100 mL/Hr) IV Continuous <Continuous>  dextrose 50% Injectable 25 Gram(s) IV Push once  dextrose 50% Injectable 12.5 Gram(s) IV Push once  dextrose 50% Injectable 25 Gram(s) IV Push once  folic acid 1 milliGRAM(s) Oral daily  glucagon  Injectable 1 milliGRAM(s) IntraMuscular once  heparin   Injectable 5000 Unit(s) SubCutaneous every 8 hours  insulin lispro (ADMELOG) corrective regimen sliding scale   SubCutaneous three times a day before meals  insulin lispro (ADMELOG) corrective regimen sliding scale   SubCutaneous at bedtime  levothyroxine 25 MICROGram(s) Oral daily  melatonin 9 milliGRAM(s) Oral at bedtime  multivitamin 1 Tablet(s) Oral daily  rOPINIRole 1 milliGRAM(s) Oral at bedtime  simvastatin 40 milliGRAM(s) Oral at bedtime    MEDICATIONS  (PRN):  dextrose Oral Gel 15 Gram(s) Oral once PRN Blood Glucose LESS THAN 70 milliGRAM(s)/deciliter        LABS                                            9.8                   Neurophils% (auto):   x      (11-14 @ 05:19):    11.19)-----------(215          Lymphocytes% (auto):  x                                             28.7                   Eosinphils% (auto):   x        Manual%: Neutrophils x    ; Lymphocytes x    ; Eosinophils x    ; Bands%: x    ; Blasts x                                    125    |  93     |  14                  Calcium: 8.4   / iCa: x      (11-14 @ 10:02)    ----------------------------<  174       Magnesium: 2.00                             5.1     |  21     |  1.09             Phosphorous: 4.2                ASSESSMENT & PLAN:  67y female PMHx HTN, HLD, DM2, iron deficiency anemia (s/p iron transfusion yesterday) presenting with generalized weakness, dizziness and lower extremity heaviness admitted to the MICU for hyponatremia.     PLAN  =====Neurologic=====  Patient is AOx3, at baseline    #Restless Leg Syndrome  -Likely i/s/o known iron deficiency anemia  -Starting home ropinirole 1 mg at bedtime    =====Pulmonary=====  Patient breathing comfortably on room air. No active issues. Reported SOB resolved, will monitor.     =====Cardiovascular=====  Patient does not currently require vasopressors and is normotensive. No active issues    =====GI=====  No acute issues     #Diet  - Soft and bite sized (with 1L fluid restriction)       =====Renal/=====  #Hyponatremia (Na 112, 11/12)  Likely 2/2 diarrhea, diuretic use, poor PO intake, urinary retention and SIADH   - Started on 2% and 3% NaCl boluses (30cc for 4 hours) on 11/13 as per nephro  - Noted overcorrection with increased UOP, s/p D5 fluid and desmopressin   - BMP q4h, Serum Sosm/Uosm   - F/u Nephro recs   - strict monitor I/Os   - Aim for 6-8mEq in 24 hours   -Elevated TSH with normal T3,T4. Normal cortisol. Mildly elevated TGs  - On fluid restriction diet.      #Hyperkalemia  -K 7.4 on admission, s/p Insulin, D50, Lokelma  -K normalized  -Low K diet    #CKD  -Unknown prior baseline  -F/u renal U/S  =====Endocrine=====  #DM2  - ISS   - On a CC diet     #Hypothyroidism  -Elevated TSH with normal T3, T4  - C/w current dose of synthroid      =====Infectious Disease=====  Patient is afebrile. Patient has leukocytosis, unlikely from infectious etiology. Likely 2/2 inflammation and recent diarrhea.     =====Heme/Onc=====  #DVT Prophylaxis  - Heparin 5000 subq     #Iron deficiency anemia  - Hgb stable , no active signs of bleeding   - Patient gets IV Iron outpatient as per daughter. In addition, she has tried oral Iron in past but did not tolerate it.     =====Ethics=====  FULL CODE  Speaks [place] MICU Transfer Note  ---------------------------    Transfer from: MICU  Transfer to:  ( X ) Medicine    (  ) Telemetry    (  ) RCU    (  ) Palliative    (  ) Stroke Unit    (  ) _______________  Accepting Physician:      MICU COURSE  67y F PMHx HTN, HLD, DM2, iron deficiency anemia (s/p iron transfusion yesterday), CKD (unknown baseline) presenting with generalized weakness, dizziness and lower extremity heaviness as well as diarrhea for 2 weeks admitted to the MICU for electrolyte disturbances (Na 112, K 7.4, Cl 83). Of note, patient also started finerenone for CKD in September. For elevated K+, patient given Insulin, D50 and Lokelma with improvement in K+. Hypo-osmolar euvolemic hyponatremia believed to be 2/2 diarrhea, urinary retention, diuretic use. For hyponatremia, started on 3% NaCl with improvement of Na to 116. Patient also received D5 and desmopressin due to increased UOP after Lao placement. Patient given additional 2% and 3% saline boluses with Na corrected to 125.     For Follow-Up:  [ ] BMP q8  [ ] F/u renal U/S  [ ] Hold Cozaar and finerenone  [ ] Renal recs    OBJECTIVE --  Vital Signs Last 24 Hrs  T(C): 36.1 (14 Nov 2022 08:00), Max: 36.5 (13 Nov 2022 12:00)  T(F): 96.9 (14 Nov 2022 08:00), Max: 97.7 (13 Nov 2022 12:00)  HR: 57 (14 Nov 2022 11:00) (56 - 85)  BP: 139/106 (14 Nov 2022 11:00) (97/63 - 156/133)  BP(mean): 115 (14 Nov 2022 11:00) (60 - 140)  RR: 15 (14 Nov 2022 11:00) (10 - 22)  SpO2: 100% (14 Nov 2022 11:00) (96% - 100%)    Parameters below as of 14 Nov 2022 11:00  Patient On (Oxygen Delivery Method): room air        I&O's Summary    13 Nov 2022 07:01  -  14 Nov 2022 07:00  --------------------------------------------------------  IN: 980 mL / OUT: 2010 mL / NET: -1030 mL    14 Nov 2022 07:01  -  14 Nov 2022 11:51  --------------------------------------------------------  IN: 0 mL / OUT: 300 mL / NET: -300 mL        MEDICATIONS  (STANDING):  atenolol  Tablet 50 milliGRAM(s) Oral daily  chlorhexidine 2% Cloths 1 Application(s) Topical <User Schedule>  dextrose 5%. 1000 milliLiter(s) (50 mL/Hr) IV Continuous <Continuous>  dextrose 5%. 1000 milliLiter(s) (100 mL/Hr) IV Continuous <Continuous>  dextrose 50% Injectable 25 Gram(s) IV Push once  dextrose 50% Injectable 12.5 Gram(s) IV Push once  dextrose 50% Injectable 25 Gram(s) IV Push once  folic acid 1 milliGRAM(s) Oral daily  glucagon  Injectable 1 milliGRAM(s) IntraMuscular once  heparin   Injectable 5000 Unit(s) SubCutaneous every 8 hours  insulin lispro (ADMELOG) corrective regimen sliding scale   SubCutaneous three times a day before meals  insulin lispro (ADMELOG) corrective regimen sliding scale   SubCutaneous at bedtime  levothyroxine 25 MICROGram(s) Oral daily  melatonin 9 milliGRAM(s) Oral at bedtime  multivitamin 1 Tablet(s) Oral daily  rOPINIRole 1 milliGRAM(s) Oral at bedtime  simvastatin 40 milliGRAM(s) Oral at bedtime    MEDICATIONS  (PRN):  dextrose Oral Gel 15 Gram(s) Oral once PRN Blood Glucose LESS THAN 70 milliGRAM(s)/deciliter        LABS                                            9.8                   Neurophils% (auto):   x      (11-14 @ 05:19):    11.19)-----------(215          Lymphocytes% (auto):  x                                             28.7                   Eosinphils% (auto):   x        Manual%: Neutrophils x    ; Lymphocytes x    ; Eosinophils x    ; Bands%: x    ; Blasts x                                    125    |  93     |  14                  Calcium: 8.4   / iCa: x      (11-14 @ 10:02)    ----------------------------<  174       Magnesium: 2.00                             5.1     |  21     |  1.09             Phosphorous: 4.2                ASSESSMENT & PLAN:  67y female PMHx HTN, HLD, DM2, iron deficiency anemia (s/p iron transfusion yesterday) presenting with generalized weakness, dizziness and lower extremity heaviness admitted to the MICU for hyponatremia.     PLAN  =====Neurologic=====  Patient is AOx3, at baseline    #Restless Leg Syndrome  -Likely i/s/o known iron deficiency anemia  -Starting home ropinirole 1 mg at bedtime    =====Pulmonary=====  Patient breathing comfortably on room air. No active issues. Reported SOB resolved, will monitor.     =====Cardiovascular=====  Patient does not currently require vasopressors and is normotensive. No active issues    =====GI=====  No acute issues     #Diet  - Soft and bite sized (with 1L fluid restriction)       =====Renal/=====  #Hyponatremia (Na 112, 11/12)  Likely 2/2 diarrhea, diuretic use, poor PO intake, urinary retention and SIADH   - Started on 2% and 3% NaCl boluses (30cc for 4 hours) on 11/13 as per nephro  - Noted overcorrection with increased UOP, s/p D5 fluid and desmopressin   - BMP q4h, Serum Sosm/Uosm   - F/u Nephro recs   - strict monitor I/Os   - Aim for 6-8mEq in 24 hours   -Elevated TSH with normal T3,T4. Normal cortisol. Mildly elevated TGs  -Holding home diuretics  - On fluid restriction diet.      #Hyperkalemia  -K 7.4 on admission, s/p Insulin, D50, Lokelma  -K normalized  -Low K diet    #CKD  -Unknown prior baseline  -F/u renal U/S  =====Endocrine=====  #DM2  - ISS   - On a CC diet     #Hypothyroidism  -Elevated TSH with normal T3, T4  - C/w current dose of synthroid      =====Infectious Disease=====  Patient is afebrile. Patient has leukocytosis, unlikely from infectious etiology. Likely 2/2 inflammation and recent diarrhea.     =====Heme/Onc=====  #DVT Prophylaxis  - Heparin 5000 subq     #Iron deficiency anemia  - Hgb stable , no active signs of bleeding   - Patient gets IV Iron outpatient as per daughter. In addition, she has tried oral Iron in past but did not tolerate it.     =====Ethics=====  FULL CODE  Speaks [place] MICU Transfer Note  ---------------------------    Transfer from: MICU  Transfer to:  ( X ) Medicine    (  ) Telemetry    (  ) RCU    (  ) Palliative    (  ) Stroke Unit    (  ) _______________  Accepting Physician:      MICU COURSE  67y F PMHx HTN, HLD, DM2, iron deficiency anemia (s/p iron transfusion yesterday), CKD (unknown baseline) presenting with generalized weakness, dizziness and lower extremity heaviness as well as diarrhea for 2 weeks admitted to the MICU for electrolyte disturbances (Na 112, K 7.4, Cl 83). Of note, patient also started finerenone for CKD in September. For elevated K+, patient given Insulin, D50 and Lokelma with improvement in K+. Hypo-osmolar euvolemic hyponatremia believed to be 2/2 diarrhea, urinary retention, diuretic use. For hyponatremia, started on 3% NaCl with improvement of Na to 116. Patient also received D5 and desmopressin due to increased UOP after Lao placement. Patient given additional 2% and 3% saline boluses with Na corrected to 125.     For Follow-Up:  [ ] BMP q4  [ ] Monitor UOP  [ ] F/u renal U/S  [ ] Hold Cozaar and finerenone  [ ] Renal recs    OBJECTIVE --  Vital Signs Last 24 Hrs  T(C): 36.1 (14 Nov 2022 08:00), Max: 36.5 (13 Nov 2022 12:00)  T(F): 96.9 (14 Nov 2022 08:00), Max: 97.7 (13 Nov 2022 12:00)  HR: 57 (14 Nov 2022 11:00) (56 - 85)  BP: 139/106 (14 Nov 2022 11:00) (97/63 - 156/133)  BP(mean): 115 (14 Nov 2022 11:00) (60 - 140)  RR: 15 (14 Nov 2022 11:00) (10 - 22)  SpO2: 100% (14 Nov 2022 11:00) (96% - 100%)    Parameters below as of 14 Nov 2022 11:00  Patient On (Oxygen Delivery Method): room air        I&O's Summary    13 Nov 2022 07:01 - 14 Nov 2022 07:00  --------------------------------------------------------  IN: 980 mL / OUT: 2010 mL / NET: -1030 mL    14 Nov 2022 07:01  -  14 Nov 2022 11:51  --------------------------------------------------------  IN: 0 mL / OUT: 300 mL / NET: -300 mL        MEDICATIONS  (STANDING):  atenolol  Tablet 50 milliGRAM(s) Oral daily  chlorhexidine 2% Cloths 1 Application(s) Topical <User Schedule>  dextrose 5%. 1000 milliLiter(s) (50 mL/Hr) IV Continuous <Continuous>  dextrose 5%. 1000 milliLiter(s) (100 mL/Hr) IV Continuous <Continuous>  dextrose 50% Injectable 25 Gram(s) IV Push once  dextrose 50% Injectable 12.5 Gram(s) IV Push once  dextrose 50% Injectable 25 Gram(s) IV Push once  folic acid 1 milliGRAM(s) Oral daily  glucagon  Injectable 1 milliGRAM(s) IntraMuscular once  heparin   Injectable 5000 Unit(s) SubCutaneous every 8 hours  insulin lispro (ADMELOG) corrective regimen sliding scale   SubCutaneous three times a day before meals  insulin lispro (ADMELOG) corrective regimen sliding scale   SubCutaneous at bedtime  levothyroxine 25 MICROGram(s) Oral daily  melatonin 9 milliGRAM(s) Oral at bedtime  multivitamin 1 Tablet(s) Oral daily  rOPINIRole 1 milliGRAM(s) Oral at bedtime  simvastatin 40 milliGRAM(s) Oral at bedtime    MEDICATIONS  (PRN):  dextrose Oral Gel 15 Gram(s) Oral once PRN Blood Glucose LESS THAN 70 milliGRAM(s)/deciliter        LABS                                            9.8                   Neurophils% (auto):   x      (11-14 @ 05:19):    11.19)-----------(215          Lymphocytes% (auto):  x                                             28.7                   Eosinphils% (auto):   x        Manual%: Neutrophils x    ; Lymphocytes x    ; Eosinophils x    ; Bands%: x    ; Blasts x                                    125    |  93     |  14                  Calcium: 8.4   / iCa: x      (11-14 @ 10:02)    ----------------------------<  174       Magnesium: 2.00                             5.1     |  21     |  1.09             Phosphorous: 4.2                ASSESSMENT & PLAN:  67y female PMHx HTN, HLD, DM2, iron deficiency anemia (s/p iron transfusion yesterday) presenting with generalized weakness, dizziness and lower extremity heaviness admitted to the MICU for hyponatremia.     PLAN  =====Neurologic=====  Patient is AOx3, at baseline    #Restless Leg Syndrome  -Likely i/s/o known iron deficiency anemia  -Starting home ropinirole 1 mg at bedtime    =====Pulmonary=====  Patient breathing comfortably on room air. No active issues. Reported SOB resolved, will monitor.     =====Cardiovascular=====  Patient does not currently require vasopressors and is normotensive. No active issues    =====GI=====  No acute issues     #Diet  - Soft and bite sized (with 1L fluid restriction)       =====Renal/=====  #Hyponatremia (Na 112, 11/12)  Likely 2/2 diarrhea, diuretic use, poor PO intake, urinary retention and SIADH   - Started on 2% and 3% NaCl boluses (30cc for 4 hours) on 11/13 as per nephro  - Noted overcorrection with increased UOP, s/p D5 fluid and desmopressin   - BMP q4h, Serum Sosm/Uosm   - F/u Nephro recs   - strict monitor I/Os   - Aim for 6-8mEq in 24 hours   -Elevated TSH with normal T3,T4. Normal cortisol. Mildly elevated TGs  -Holding home diuretics  - On fluid restriction diet.      #Hyperkalemia  -K 7.4 on admission, s/p Insulin, D50, Lokelma  -K normalized  -Low K diet    #CKD  -Unknown prior baseline  -F/u renal U/S  =====Endocrine=====  #DM2  - ISS   - On a CC diet     #Hypothyroidism  -Elevated TSH with normal T3, T4  - C/w current dose of synthroid      =====Infectious Disease=====  Patient is afebrile. Patient has leukocytosis, unlikely from infectious etiology. Likely 2/2 inflammation and recent diarrhea.     =====Heme/Onc=====  #DVT Prophylaxis  - Heparin 5000 subq     #Iron deficiency anemia  - Hgb stable , no active signs of bleeding   - Patient gets IV Iron outpatient as per daughter. In addition, she has tried oral Iron in past but did not tolerate it.     =====Ethics=====  FULL CODE  Speaks [place] MICU Transfer Note  ---------------------------    Transfer from: MICU  Transfer to:  ( X ) Medicine    (  ) Telemetry    (  ) RCU    (  ) Palliative    (  ) Stroke Unit    (  ) _______________  Accepting Physician:      MICU COURSE  67y F PMHx HTN, HLD, DM2, iron deficiency anemia (s/p iron transfusion yesterday), CKD (unknown baseline) presenting with generalized weakness, dizziness and lower extremity heaviness as well as diarrhea for 2 weeks admitted to the MICU for electrolyte disturbances (Na 112, K 7.4, Cl 83). Of note, patient also started finerenone for CKD in September. For elevated K+, patient given Insulin, D50 and Lokelma with improvement in K+. Hypo-osmolar euvolemic hyponatremia believed to be 2/2 diarrhea, urinary retention, diuretic use. For hyponatremia, started on 3% NaCl with improvement of Na to 116. Patient also received D5 and desmopressin due to increased UOP after Lao placement. Patient given additional 2% and 3% saline boluses with Na corrected to 125. Na increased to 127, given desmopressin to prevent overcorrection given large UOP, plan to follow up repeat BMP.     For Follow-Up:  [ ] BMP q4  [ ] Monitor UOP  [ ] F/u renal U/S  [ ] Hold Cozaar and finerenone  [ ] Renal recs    OBJECTIVE --  Vital Signs Last 24 Hrs  T(C): 36.1 (14 Nov 2022 08:00), Max: 36.5 (13 Nov 2022 12:00)  T(F): 96.9 (14 Nov 2022 08:00), Max: 97.7 (13 Nov 2022 12:00)  HR: 57 (14 Nov 2022 11:00) (56 - 85)  BP: 139/106 (14 Nov 2022 11:00) (97/63 - 156/133)  BP(mean): 115 (14 Nov 2022 11:00) (60 - 140)  RR: 15 (14 Nov 2022 11:00) (10 - 22)  SpO2: 100% (14 Nov 2022 11:00) (96% - 100%)    Parameters below as of 14 Nov 2022 11:00  Patient On (Oxygen Delivery Method): room air        I&O's Summary    13 Nov 2022 07:01  -  14 Nov 2022 07:00  --------------------------------------------------------  IN: 980 mL / OUT: 2010 mL / NET: -1030 mL    14 Nov 2022 07:01  -  14 Nov 2022 11:51  --------------------------------------------------------  IN: 0 mL / OUT: 300 mL / NET: -300 mL        MEDICATIONS  (STANDING):  atenolol  Tablet 50 milliGRAM(s) Oral daily  chlorhexidine 2% Cloths 1 Application(s) Topical <User Schedule>  dextrose 5%. 1000 milliLiter(s) (50 mL/Hr) IV Continuous <Continuous>  dextrose 5%. 1000 milliLiter(s) (100 mL/Hr) IV Continuous <Continuous>  dextrose 50% Injectable 25 Gram(s) IV Push once  dextrose 50% Injectable 12.5 Gram(s) IV Push once  dextrose 50% Injectable 25 Gram(s) IV Push once  folic acid 1 milliGRAM(s) Oral daily  glucagon  Injectable 1 milliGRAM(s) IntraMuscular once  heparin   Injectable 5000 Unit(s) SubCutaneous every 8 hours  insulin lispro (ADMELOG) corrective regimen sliding scale   SubCutaneous three times a day before meals  insulin lispro (ADMELOG) corrective regimen sliding scale   SubCutaneous at bedtime  levothyroxine 25 MICROGram(s) Oral daily  melatonin 9 milliGRAM(s) Oral at bedtime  multivitamin 1 Tablet(s) Oral daily  rOPINIRole 1 milliGRAM(s) Oral at bedtime  simvastatin 40 milliGRAM(s) Oral at bedtime    MEDICATIONS  (PRN):  dextrose Oral Gel 15 Gram(s) Oral once PRN Blood Glucose LESS THAN 70 milliGRAM(s)/deciliter        LABS                                            9.8                   Neurophils% (auto):   x      (11-14 @ 05:19):    11.19)-----------(215          Lymphocytes% (auto):  x                                             28.7                   Eosinphils% (auto):   x        Manual%: Neutrophils x    ; Lymphocytes x    ; Eosinophils x    ; Bands%: x    ; Blasts x                                    125    |  93     |  14                  Calcium: 8.4   / iCa: x      (11-14 @ 10:02)    ----------------------------<  174       Magnesium: 2.00                             5.1     |  21     |  1.09             Phosphorous: 4.2                ASSESSMENT & PLAN:  67y female PMHx HTN, HLD, DM2, iron deficiency anemia (s/p iron transfusion yesterday) presenting with generalized weakness, dizziness and lower extremity heaviness admitted to the MICU for hyponatremia.     PLAN  =====Neurologic=====  Patient is AOx3, at baseline    #Restless Leg Syndrome  -Likely i/s/o known iron deficiency anemia  -Starting home ropinirole 1 mg at bedtime    =====Pulmonary=====  Patient breathing comfortably on room air. No active issues. Reported SOB resolved, will monitor.     =====Cardiovascular=====  Patient does not currently require vasopressors and is normotensive. No active issues    =====GI=====  No acute issues     #Diet  - Soft and bite sized (with 1L fluid restriction)       =====Renal/=====  #Hyponatremia (Na 112, 11/12)  Likely 2/2 diarrhea, diuretic use, poor PO intake, urinary retention and SIADH   - Started on 2% and 3% NaCl boluses (30cc for 4 hours) on 11/13 as per nephro  - Noted overcorrection with increased UOP, s/p D5 fluid and desmopressin   - BMP q4h, Serum Sosm/Uosm   - F/u Nephro recs   - strict monitor I/Os   - Aim for 6-8mEq in 24 hours   -Elevated TSH with normal T3,T4. Normal cortisol. Mildly elevated TGs  -Holding home diuretics  - On fluid restriction diet.      #Hyperkalemia  -K 7.4 on admission, s/p Insulin, D50, Lokelma  -K normalized  -Low K diet    #CKD  -Unknown prior baseline  -F/u renal U/S  =====Endocrine=====  #DM2  - ISS   - On a CC diet     #Hypothyroidism  -Elevated TSH with normal T3, T4  - C/w current dose of synthroid      =====Infectious Disease=====  Patient is afebrile. Patient has leukocytosis, unlikely from infectious etiology. Likely 2/2 inflammation and recent diarrhea.     =====Heme/Onc=====  #DVT Prophylaxis  - Heparin 5000 subq     #Iron deficiency anemia  - Hgb stable , no active signs of bleeding   - Patient gets IV Iron outpatient as per daughter. In addition, she has tried oral Iron in past but did not tolerate it.     =====Ethics=====  FULL CODE  Speaks Pashto MICU Transfer Note  ---------------------------    Transfer from: MICU  Transfer to:  ( X ) Medicine    (  ) Telemetry    (  ) RCU    (  ) Palliative    (  ) Stroke Unit    (  ) _______________  Accepting Physician:      MICU COURSE  67y F PMHx HTN, HLD, DM2, iron deficiency anemia, CKD (unknown baseline) presenting with generalized weakness, dizziness and lower extremity heaviness as well as diarrhea for 2 weeks admitted to the MICU for electrolyte disturbances (Na 112, K 7.4, Cl 83). Of note, patient also started finerenone for CKD in September. For elevated K+, patient given Insulin, D50 and Lokelma with improvement in K+. Hypo-osmolar euvolemic hyponatremia believed to be 2/2 diarrhea, urinary retention, diuretic use. For hyponatremia, started on 3% NaCl with improvement of Na to 116. Patient also received D5 and desmopressin due to increased UOP after [place] placement. Patient given additional 2% and 3% saline boluses with Na corrected to 125. Na increased to 127, given desmopressin to prevent overcorrection given large UOP, plan to follow up repeat BMP.     For Follow-Up:  [ ] BMP q4  [ ] Monitor UOP  [ ] F/u renal U/S  [ ] Hold Cozaar and finerenone  [ ] Renal recs    OBJECTIVE --  Vital Signs Last 24 Hrs  T(C): 36.1 (14 Nov 2022 08:00), Max: 36.5 (13 Nov 2022 12:00)  T(F): 96.9 (14 Nov 2022 08:00), Max: 97.7 (13 Nov 2022 12:00)  HR: 57 (14 Nov 2022 11:00) (56 - 85)  BP: 139/106 (14 Nov 2022 11:00) (97/63 - 156/133)  BP(mean): 115 (14 Nov 2022 11:00) (60 - 140)  RR: 15 (14 Nov 2022 11:00) (10 - 22)  SpO2: 100% (14 Nov 2022 11:00) (96% - 100%)    Parameters below as of 14 Nov 2022 11:00  Patient On (Oxygen Delivery Method): room air        I&O's Summary    13 Nov 2022 07:01  -  14 Nov 2022 07:00  --------------------------------------------------------  IN: 980 mL / OUT: 2010 mL / NET: -1030 mL    14 Nov 2022 07:01  -  14 Nov 2022 11:51  --------------------------------------------------------  IN: 0 mL / OUT: 300 mL / NET: -300 mL        MEDICATIONS  (STANDING):  atenolol  Tablet 50 milliGRAM(s) Oral daily  chlorhexidine 2% Cloths 1 Application(s) Topical <User Schedule>  dextrose 5%. 1000 milliLiter(s) (50 mL/Hr) IV Continuous <Continuous>  dextrose 5%. 1000 milliLiter(s) (100 mL/Hr) IV Continuous <Continuous>  dextrose 50% Injectable 25 Gram(s) IV Push once  dextrose 50% Injectable 12.5 Gram(s) IV Push once  dextrose 50% Injectable 25 Gram(s) IV Push once  folic acid 1 milliGRAM(s) Oral daily  glucagon  Injectable 1 milliGRAM(s) IntraMuscular once  heparin   Injectable 5000 Unit(s) SubCutaneous every 8 hours  insulin lispro (ADMELOG) corrective regimen sliding scale   SubCutaneous three times a day before meals  insulin lispro (ADMELOG) corrective regimen sliding scale   SubCutaneous at bedtime  levothyroxine 25 MICROGram(s) Oral daily  melatonin 9 milliGRAM(s) Oral at bedtime  multivitamin 1 Tablet(s) Oral daily  rOPINIRole 1 milliGRAM(s) Oral at bedtime  simvastatin 40 milliGRAM(s) Oral at bedtime    MEDICATIONS  (PRN):  dextrose Oral Gel 15 Gram(s) Oral once PRN Blood Glucose LESS THAN 70 milliGRAM(s)/deciliter        LABS                                            9.8                   Neurophils% (auto):   x      (11-14 @ 05:19):    11.19)-----------(215          Lymphocytes% (auto):  x                                             28.7                   Eosinphils% (auto):   x        Manual%: Neutrophils x    ; Lymphocytes x    ; Eosinophils x    ; Bands%: x    ; Blasts x                                    125    |  93     |  14                  Calcium: 8.4   / iCa: x      (11-14 @ 10:02)    ----------------------------<  174       Magnesium: 2.00                             5.1     |  21     |  1.09             Phosphorous: 4.2                ASSESSMENT & PLAN:  67y female PMHx HTN, HLD, DM2, iron deficiency anemia (s/p iron transfusion yesterday) presenting with generalized weakness, dizziness and lower extremity heaviness admitted to the MICU for hyponatremia.     PLAN  =====Neurologic=====  Patient is AOx3, at baseline    #Restless Leg Syndrome  -Likely i/s/o known iron deficiency anemia  -Starting home ropinirole 1 mg at bedtime    =====Pulmonary=====  Patient breathing comfortably on room air. No active issues. Reported SOB resolved, will monitor.     =====Cardiovascular=====  Patient does not currently require vasopressors and is normotensive. No active issues    =====GI=====  No acute issues     #Diet  - Soft and bite sized (with 1L fluid restriction)       =====Renal/=====  #Hyponatremia (Na 112, 11/12)  Likely 2/2 diarrhea, diuretic use, poor PO intake, urinary retention and SIADH   - Started on 2% and 3% NaCl boluses (30cc for 4 hours) on 11/13 as per nephro  - Noted overcorrection with increased UOP, s/p D5 fluid and desmopressin   - BMP q4h, Serum Sosm/Uosm   - F/u Nephro recs   - strict monitor I/Os   - Aim for 6-8mEq in 24 hours   -Elevated TSH with normal T3,T4. Normal cortisol. Mildly elevated TGs  -Holding home diuretics  - On fluid restriction diet.      #Hyperkalemia  -K 7.4 on admission, s/p Insulin, D50, Lokelma  -K normalized  -Low K diet    #CKD  -Unknown prior baseline  -F/u renal U/S  =====Endocrine=====  #DM2  - ISS   - On a CC diet     #Hypothyroidism  -Elevated TSH with normal T3, T4  - C/w current dose of synthroid      =====Infectious Disease=====  Patient is afebrile. Patient has leukocytosis, unlikely from infectious etiology. Likely 2/2 inflammation and recent diarrhea.     =====Heme/Onc=====  #DVT Prophylaxis  - Heparin 5000 subq     #Iron deficiency anemia  - Hgb stable , no active signs of bleeding   - Patient gets IV Iron outpatient as per daughter. In addition, she has tried oral Iron in past but did not tolerate it.     =====Ethics=====  FULL CODE  Speaks Thai MICU Transfer Note  ---------------------------    Transfer from: MICU  Transfer to:  ( X ) Medicine    (  ) Telemetry    (  ) RCU    (  ) Palliative    (  ) Stroke Unit    (  ) _______________  Accepting Physician: Dr. Ackerman       MICU COURSE  67y F PMHx HTN, HLD, DM2, iron deficiency anemia, CKD (unknown baseline) presenting with generalized weakness, dizziness and lower extremity heaviness as well as diarrhea for 2 weeks admitted to the MICU for electrolyte disturbances (Na 112, K 7.4, Cl 83). Of note, patient also started finerenone for CKD in September. For elevated K+, patient given Insulin, D50 and Lokelma with improvement in K+. Hypo-osmolar euvolemic hyponatremia believed to be 2/2 diarrhea, urinary retention, diuretic use. For hyponatremia, started on 3% NaCl with improvement of Na to 116. Patient also received D5 and desmopressin due to increased UOP after [place] placement. Patient given additional 2% and 3% saline boluses with Na corrected to 125. Na increased to 127, given desmopressin to prevent overcorrection given large UOP, plan to follow up repeat BMP.     For Follow-Up:  [ ] BMP q4  [ ] Monitor UOP  [ ] F/u renal U/S  [ ] Hold Cozaar and finerenone  [ ] Renal recs    OBJECTIVE --  Vital Signs Last 24 Hrs  T(C): 36.1 (14 Nov 2022 08:00), Max: 36.5 (13 Nov 2022 12:00)  T(F): 96.9 (14 Nov 2022 08:00), Max: 97.7 (13 Nov 2022 12:00)  HR: 57 (14 Nov 2022 11:00) (56 - 85)  BP: 139/106 (14 Nov 2022 11:00) (97/63 - 156/133)  BP(mean): 115 (14 Nov 2022 11:00) (60 - 140)  RR: 15 (14 Nov 2022 11:00) (10 - 22)  SpO2: 100% (14 Nov 2022 11:00) (96% - 100%)    Parameters below as of 14 Nov 2022 11:00  Patient On (Oxygen Delivery Method): room air        I&O's Summary    13 Nov 2022 07:01  -  14 Nov 2022 07:00  --------------------------------------------------------  IN: 980 mL / OUT: 2010 mL / NET: -1030 mL    14 Nov 2022 07:01  -  14 Nov 2022 11:51  --------------------------------------------------------  IN: 0 mL / OUT: 300 mL / NET: -300 mL        MEDICATIONS  (STANDING):  atenolol  Tablet 50 milliGRAM(s) Oral daily  chlorhexidine 2% Cloths 1 Application(s) Topical <User Schedule>  dextrose 5%. 1000 milliLiter(s) (50 mL/Hr) IV Continuous <Continuous>  dextrose 5%. 1000 milliLiter(s) (100 mL/Hr) IV Continuous <Continuous>  dextrose 50% Injectable 25 Gram(s) IV Push once  dextrose 50% Injectable 12.5 Gram(s) IV Push once  dextrose 50% Injectable 25 Gram(s) IV Push once  folic acid 1 milliGRAM(s) Oral daily  glucagon  Injectable 1 milliGRAM(s) IntraMuscular once  heparin   Injectable 5000 Unit(s) SubCutaneous every 8 hours  insulin lispro (ADMELOG) corrective regimen sliding scale   SubCutaneous three times a day before meals  insulin lispro (ADMELOG) corrective regimen sliding scale   SubCutaneous at bedtime  levothyroxine 25 MICROGram(s) Oral daily  melatonin 9 milliGRAM(s) Oral at bedtime  multivitamin 1 Tablet(s) Oral daily  rOPINIRole 1 milliGRAM(s) Oral at bedtime  simvastatin 40 milliGRAM(s) Oral at bedtime    MEDICATIONS  (PRN):  dextrose Oral Gel 15 Gram(s) Oral once PRN Blood Glucose LESS THAN 70 milliGRAM(s)/deciliter        LABS                                            9.8                   Neurophils% (auto):   x      (11-14 @ 05:19):    11.19)-----------(215          Lymphocytes% (auto):  x                                             28.7                   Eosinphils% (auto):   x        Manual%: Neutrophils x    ; Lymphocytes x    ; Eosinophils x    ; Bands%: x    ; Blasts x                                    125    |  93     |  14                  Calcium: 8.4   / iCa: x      (11-14 @ 10:02)    ----------------------------<  174       Magnesium: 2.00                             5.1     |  21     |  1.09             Phosphorous: 4.2                ASSESSMENT & PLAN:  67y female PMHx HTN, HLD, DM2, iron deficiency anemia (s/p iron transfusion yesterday) presenting with generalized weakness, dizziness and lower extremity heaviness admitted to the MICU for hyponatremia.     PLAN  =====Neurologic=====  Patient is AOx3, at baseline    #Restless Leg Syndrome  -Likely i/s/o known iron deficiency anemia  -Starting home ropinirole 1 mg at bedtime    =====Pulmonary=====  Patient breathing comfortably on room air. No active issues. Reported SOB resolved, will monitor.     =====Cardiovascular=====  Patient does not currently require vasopressors and is normotensive. No active issues    =====GI=====  No acute issues     #Diet  - Soft and bite sized (with 1L fluid restriction)       =====Renal/=====  #Hyponatremia (Na 112, 11/12)  Likely 2/2 diarrhea, diuretic use, poor PO intake, urinary retention and SIADH   - Started on 2% and 3% NaCl boluses (30cc for 4 hours) on 11/13 as per nephro  - Noted overcorrection with increased UOP, s/p D5 fluid and desmopressin   - BMP q4h, Serum Sosm/Uosm   - F/u Nephro recs   - strict monitor I/Os   - Aim for 6-8mEq in 24 hours   -Elevated TSH with normal T3,T4. Normal cortisol. Mildly elevated TGs  -Holding home diuretics  - On fluid restriction diet.      #Hyperkalemia  -K 7.4 on admission, s/p Insulin, D50, Lokelma  -K normalized  -Low K diet    #CKD  -Unknown prior baseline  -F/u renal U/S  =====Endocrine=====  #DM2  - ISS   - On a CC diet     #Hypothyroidism  -Elevated TSH with normal T3, T4  - C/w current dose of synthroid      =====Infectious Disease=====  Patient is afebrile. Patient has leukocytosis, unlikely from infectious etiology. Likely 2/2 inflammation and recent diarrhea.     =====Heme/Onc=====  #DVT Prophylaxis  - Heparin 5000 subq     #Iron deficiency anemia  - Hgb stable , no active signs of bleeding   - Patient gets IV Iron outpatient as per daughter. In addition, she has tried oral Iron in past but did not tolerate it.     =====Ethics=====  FULL CODE  Speaks Amharic

## 2022-11-14 NOTE — PROGRESS NOTE ADULT - PROBLEM SELECTOR PLAN 2
Pt admitted with hyperkalemia in the setting of CKD, Losartan, Fineronone, hyperglycemia and likely urine retention. Serum potassium was elevated at 7.4 on 11/11. Pt received medical management. Continue to hold Losartan and Fineronone. Serum potassium WNL (4.6) today. Low K diet. Monitor serum potassium.

## 2022-11-14 NOTE — PROGRESS NOTE ADULT - SUBJECTIVE AND OBJECTIVE BOX
Patient is a 67y old  Female who presents with a chief complaint of Hyponatremia (2022 07:20)      INTERVAL HPI/OVERNIGHT EVENTS:   No overnight events   Afebrile, hemodynamically stable     ICU Vital Signs Last 24 Hrs  T(C): 36.3 (2022 04:00), Max: 36.7 (2022 08:00)  T(F): 97.3 (2022 04:00), Max: 98 (2022 08:00)  HR: 57 (2022 06:00) (56 - 71)  BP: 114/97 (:00) (97/63 - 156/133)  BP(mean): 101 (:00) (60 - 140)  ABP: --  ABP(mean): --  RR: 16 (2022 06:00) (12 - 22)  SpO2: 98% (2022 06:00) (96% - 100%)    O2 Parameters below as of 2022 04:00  Patient On (Oxygen Delivery Method): room air          I&O's Summary    2022 07:01  -  2022 07:00  --------------------------------------------------------  IN: 980 mL / OUT: 1935 mL / NET: -955 mL          LABS:                        9.8    11.19 )-----------( 215      ( 2022 05:19 )             28.7     11-14    120<LL>  |  89<L>  |  15  ----------------------------<  159<H>  4.6   |  20<L>  |  1.11    Ca    8.4      2022 05:19  Phos  4.2     11-14  Mg     2.00     11-14        Urinalysis Basic - ( 2022 06:04 )    Color: Light Red / Appearance: Slightly Turbid / S.007 / pH: x  Gluc: x / Ketone: Negative  / Bili: Negative / Urobili: <2 mg/dL   Blood: x / Protein: 30 mg/dL / Nitrite: Negative   Leuk Esterase: Small / RBC: 470 /HPF / WBC 9 /HPF   Sq Epi: x / Non Sq Epi: 3 /HPF / Bacteria: Negative      CAPILLARY BLOOD GLUCOSE      POCT Blood Glucose.: 154 mg/dL (2022 21:56)  POCT Blood Glucose.: 140 mg/dL (2022 16:47)  POCT Blood Glucose.: 204 mg/dL (2022 11:42)  POCT Blood Glucose.: 136 mg/dL (2022 08:10)        RADIOLOGY & ADDITIONAL TESTS:    Consultant(s) Notes Reviewed:  [x ] YES  [ ] NO    MEDICATIONS  (STANDING):  atenolol  Tablet 50 milliGRAM(s) Oral daily  chlorhexidine 2% Cloths 1 Application(s) Topical <User Schedule>  dextrose 5%. 1000 milliLiter(s) (50 mL/Hr) IV Continuous <Continuous>  dextrose 5%. 1000 milliLiter(s) (100 mL/Hr) IV Continuous <Continuous>  dextrose 50% Injectable 25 Gram(s) IV Push once  dextrose 50% Injectable 12.5 Gram(s) IV Push once  dextrose 50% Injectable 25 Gram(s) IV Push once  folic acid 1 milliGRAM(s) Oral daily  glucagon  Injectable 1 milliGRAM(s) IntraMuscular once  heparin   Injectable 5000 Unit(s) SubCutaneous every 8 hours  insulin lispro (ADMELOG) corrective regimen sliding scale   SubCutaneous three times a day before meals  insulin lispro (ADMELOG) corrective regimen sliding scale   SubCutaneous at bedtime  levothyroxine 25 MICROGram(s) Oral daily  melatonin 9 milliGRAM(s) Oral at bedtime  multivitamin 1 Tablet(s) Oral daily  simvastatin 40 milliGRAM(s) Oral at bedtime  Sodium Chloride 2% 500 milliLiter(s) 500 milliLiter(s) (30 mL/Hr) IV Continuous <Continuous>  sodium chloride 3%. 500 milliLiter(s) (30 mL/Hr) IV Continuous <Continuous>    MEDICATIONS  (PRN):  dextrose Oral Gel 15 Gram(s) Oral once PRN Blood Glucose LESS THAN 70 milliGRAM(s)/deciliter      PHYSICAL EXAM:  GENERAL:   HEAD:  Atraumatic, Normocephalic  EYES: EOMI, PERRLA, conjunctiva and sclera clear  NECK: Supple, No JVD, Normal thyroid, no enlarged nodes  NERVOUS SYSTEM:  Alert & Awake.   CHEST/LUNG: B/L good air entry; No rales, rhonchi, or wheezing  HEART: S1S2 normal, no S3, Regular rate and rhythm; No murmurs  ABDOMEN: Soft, Nontender, Nondistended; Bowel sounds present  EXTREMITIES:  2+ Peripheral Pulses, No clubbing, cyanosis, or edema  LYMPH: No lymphadenopathy noted  SKIN: No rashes or lesions    Care Discussed with Consultants/Other Providers [ x] YES  [ ] NO Patient is a 67y old  Female who presents with a chief complaint of Hyponatremia (2022 07:20)      INTERVAL HPI/OVERNIGHT EVENTS:   Na 117 at 5 PM, given 2% NaCl 30 cc/hr for 4 hrs. Na 116 at 11 PM so placed on 3% NaCl 30 cc/hr for 4 hrs. Na 120 at 5 AM. All fluids held, and resulting Na 125 at 10 AM. Noted to have increased UOP this AM as well.       ICU Vital Signs Last 24 Hrs  T(C): 36.3 (2022 04:00), Max: 36.7 (2022 08:00)  T(F): 97.3 (2022 04:00), Max: 98 (2022 08:00)  HR: 57 (2022 06:00) (56 - 71)  BP: 114/97 (2022 06:00) (97/63 - 156/133)  BP(mean): 101 (:00) (60 - 140)  ABP: --  ABP(mean): --  RR: 16 (2022 06:00) (12 - 22)  SpO2: 98% (2022 06:00) (96% - 100%)    O2 Parameters below as of 2022 04:00  Patient On (Oxygen Delivery Method): room air          I&O's Summary    2022 07:01  -  2022 07:00  --------------------------------------------------------  IN: 980 mL / OUT: 1935 mL / NET: -955 mL          LABS:                        9.8    11.19 )-----------( 215      ( 2022 05:19 )             28.7     11-14    120<LL>  |  89<L>  |  15  ----------------------------<  159<H>  4.6   |  20<L>  |  1.11    Ca    8.4      2022 05:19  Phos  4.2     11-14  Mg     2.00     11-14        Urinalysis Basic - ( 2022 06:04 )    Color: Light Red / Appearance: Slightly Turbid / S.007 / pH: x  Gluc: x / Ketone: Negative  / Bili: Negative / Urobili: <2 mg/dL   Blood: x / Protein: 30 mg/dL / Nitrite: Negative   Leuk Esterase: Small / RBC: 470 /HPF / WBC 9 /HPF   Sq Epi: x / Non Sq Epi: 3 /HPF / Bacteria: Negative      CAPILLARY BLOOD GLUCOSE      POCT Blood Glucose.: 154 mg/dL (2022 21:56)  POCT Blood Glucose.: 140 mg/dL (2022 16:47)  POCT Blood Glucose.: 204 mg/dL (2022 11:42)  POCT Blood Glucose.: 136 mg/dL (2022 08:10)        RADIOLOGY & ADDITIONAL TESTS:    Consultant(s) Notes Reviewed:  [x ] YES  [ ] NO    MEDICATIONS  (STANDING):  atenolol  Tablet 50 milliGRAM(s) Oral daily  chlorhexidine 2% Cloths 1 Application(s) Topical <User Schedule>  dextrose 5%. 1000 milliLiter(s) (50 mL/Hr) IV Continuous <Continuous>  dextrose 5%. 1000 milliLiter(s) (100 mL/Hr) IV Continuous <Continuous>  dextrose 50% Injectable 25 Gram(s) IV Push once  dextrose 50% Injectable 12.5 Gram(s) IV Push once  dextrose 50% Injectable 25 Gram(s) IV Push once  folic acid 1 milliGRAM(s) Oral daily  glucagon  Injectable 1 milliGRAM(s) IntraMuscular once  heparin   Injectable 5000 Unit(s) SubCutaneous every 8 hours  insulin lispro (ADMELOG) corrective regimen sliding scale   SubCutaneous three times a day before meals  insulin lispro (ADMELOG) corrective regimen sliding scale   SubCutaneous at bedtime  levothyroxine 25 MICROGram(s) Oral daily  melatonin 9 milliGRAM(s) Oral at bedtime  multivitamin 1 Tablet(s) Oral daily  simvastatin 40 milliGRAM(s) Oral at bedtime  Sodium Chloride 2% 500 milliLiter(s) 500 milliLiter(s) (30 mL/Hr) IV Continuous <Continuous>  sodium chloride 3%. 500 milliLiter(s) (30 mL/Hr) IV Continuous <Continuous>    MEDICATIONS  (PRN):  dextrose Oral Gel 15 Gram(s) Oral once PRN Blood Glucose LESS THAN 70 milliGRAM(s)/deciliter      PHYSICAL EXAM:  GENERAL:   HEAD:  Atraumatic, Normocephalic  EYES: EOMI, PERRLA, conjunctiva and sclera clear  NECK: Supple, No JVD, Normal thyroid, no enlarged nodes  NERVOUS SYSTEM:  Alert & Awake.   CHEST/LUNG: B/L good air entry; No rales, rhonchi, or wheezing  HEART: S1S2 normal, no S3, Regular rate and rhythm; No murmurs  ABDOMEN: Soft, Nontender, Nondistended; Bowel sounds present  EXTREMITIES:  2+ Peripheral Pulses, No clubbing, cyanosis, or edema  LYMPH: No lymphadenopathy noted  SKIN: No rashes or lesions    Care Discussed with Consultants/Other Providers [ x] YES  [ ] NO Patient is a 67y old  Female who presents with a chief complaint of Hyponatremia (2022 07:20)      INTERVAL HPI/OVERNIGHT EVENTS:   Na 117 at 5 PM, given 2% NaCl 30 cc/hr for 4 hrs. Na 116 at 11 PM so placed on 3% NaCl 30 cc/hr for 4 hrs. Na 120 at 5 AM. All fluids held, and resulting Na 125 at 10 AM. Noted to have increased UOP this AM as well.     Complaining of restless legs and insomnia, known restless leg syndrome.     ICU Vital Signs Last 24 Hrs  T(C): 36.3 (2022 04:00), Max: 36.7 (2022 08:00)  T(F): 97.3 (2022 04:00), Max: 98 (2022 08:00)  HR: 57 (2022 06:00) (56 - 71)  BP: 114/97 (2022 06:00) (97/63 - 156/133)  BP(mean): 101 (2022 06:00) (60 - 140)  ABP: --  ABP(mean): --  RR: 16 (2022 06:00) (12 - 22)  SpO2: 98% (2022 06:00) (96% - 100%)    O2 Parameters below as of 2022 04:00  Patient On (Oxygen Delivery Method): room air          I&O's Summary    2022 07:01  -  2022 07:00  --------------------------------------------------------  IN: 980 mL / OUT: 1935 mL / NET: -955 mL          LABS:                        9.8    11.19 )-----------( 215      ( 2022 05:19 )             28.7     11-14    120<LL>  |  89<L>  |  15  ----------------------------<  159<H>  4.6   |  20<L>  |  1.11    Ca    8.4      2022 05:19  Phos  4.2     11-14  Mg     2.00     11-14        Urinalysis Basic - ( 2022 06:04 )    Color: Light Red / Appearance: Slightly Turbid / S.007 / pH: x  Gluc: x / Ketone: Negative  / Bili: Negative / Urobili: <2 mg/dL   Blood: x / Protein: 30 mg/dL / Nitrite: Negative   Leuk Esterase: Small / RBC: 470 /HPF / WBC 9 /HPF   Sq Epi: x / Non Sq Epi: 3 /HPF / Bacteria: Negative      CAPILLARY BLOOD GLUCOSE      POCT Blood Glucose.: 154 mg/dL (2022 21:56)  POCT Blood Glucose.: 140 mg/dL (2022 16:47)  POCT Blood Glucose.: 204 mg/dL (2022 11:42)  POCT Blood Glucose.: 136 mg/dL (2022 08:10)        RADIOLOGY & ADDITIONAL TESTS:    Consultant(s) Notes Reviewed:  [x ] YES  [ ] NO    MEDICATIONS  (STANDING):  atenolol  Tablet 50 milliGRAM(s) Oral daily  chlorhexidine 2% Cloths 1 Application(s) Topical <User Schedule>  dextrose 5%. 1000 milliLiter(s) (50 mL/Hr) IV Continuous <Continuous>  dextrose 5%. 1000 milliLiter(s) (100 mL/Hr) IV Continuous <Continuous>  dextrose 50% Injectable 25 Gram(s) IV Push once  dextrose 50% Injectable 12.5 Gram(s) IV Push once  dextrose 50% Injectable 25 Gram(s) IV Push once  folic acid 1 milliGRAM(s) Oral daily  glucagon  Injectable 1 milliGRAM(s) IntraMuscular once  heparin   Injectable 5000 Unit(s) SubCutaneous every 8 hours  insulin lispro (ADMELOG) corrective regimen sliding scale   SubCutaneous three times a day before meals  insulin lispro (ADMELOG) corrective regimen sliding scale   SubCutaneous at bedtime  levothyroxine 25 MICROGram(s) Oral daily  melatonin 9 milliGRAM(s) Oral at bedtime  multivitamin 1 Tablet(s) Oral daily  simvastatin 40 milliGRAM(s) Oral at bedtime  Sodium Chloride 2% 500 milliLiter(s) 500 milliLiter(s) (30 mL/Hr) IV Continuous <Continuous>  sodium chloride 3%. 500 milliLiter(s) (30 mL/Hr) IV Continuous <Continuous>    MEDICATIONS  (PRN):  dextrose Oral Gel 15 Gram(s) Oral once PRN Blood Glucose LESS THAN 70 milliGRAM(s)/deciliter      PHYSICAL EXAM:  GENERAL: NAD  HEAD:  Atraumatic, Normocephalic  EYES: EOMI, PERRL, conjunctiva and sclera clear  NECK: Supple, No JVD. Normal thyroid, no enlarged nodes  NERVOUS SYSTEM: Alert & Awake.   CHEST/LUNG: B/L good air entry; No rales, rhonchi, or wheezing  HEART: S1,S2 normal, no S3, Regular rate and rhythm; No murmurs  ABDOMEN: Soft, Nontender, Nondistended; Bowel sounds present  EXTREMITIES:  2+ Peripheral Pulses, No clubbing, cyanosis, or edema  LYMPH: No lymphadenopathy noted  SKIN: No rashes or lesions    Care Discussed with Consultants/Other Providers [ x] YES  [ ] NO

## 2022-11-14 NOTE — PROGRESS NOTE ADULT - SUBJECTIVE AND OBJECTIVE BOX
[facility] DIVISION OF KIDNEY DISEASES AND HYPERTENSION -- FOLLOW UP NOTE  --------------------------------------------------------------------------------  HPI: 67-year-old female with history of CKD, HTN, DM, HLD, and iron deficiency anemia presented with weakness and dizziness. Pt. being seen for hyponatremia, hyperkalemia and elevated Scr level.     Upon review of labs on [place], SNa was initially 112 on 11/11. Pt received 150 cc bolus of 3% hypertonic saline on 11/11. SNa improved to 116 on early AM labs done on 11/12/22. Pt. received desmopressin by primary medical team for high urine output.     Pt. with history of diarrhea, decreased PO intake, intermittent dizziness on presentation. As per review of previous labs (provided by daughter), Scr was 1.2 on 5/5/22 and was 1.21 on 8/18/22. SNa was WNL at 140 on 8/18/22. Pt has had history of hyponatremia in 2015 during hospitalization at [facility]. As per daughters, pt. was made aware of her kidney disease by her PCP since last ~2 months. Baseline Scr level however unknown and pt has not seen a nephrologist as outpatient in the past. Pt received hypotonic fluids (D5W) on 11/12/22 for overcorrection of SNa.     Pt seen and examined today in MICU, daughter at bedside. Pt complaining of sleep problems overnight and decreased oral intake. No fever, chest pain, SOB, abdominal pain or HA at time of evaluation.     PAST HISTORY  --------------------------------------------------------------------------------  No significant changes to PMH, PSH, FHx, SHx, unless otherwise noted    ALLERGIES & MEDICATIONS  --------------------------------------------------------------------------------  Allergies    No Known Allergies    Intolerances    Standing Inpatient Medications  atenolol  Tablet 50 milliGRAM(s) Oral daily  chlorhexidine 2% Cloths 1 Application(s) Topical <User Schedule>  dextrose 5%. 1000 milliLiter(s) IV Continuous <Continuous>  dextrose 5%. 1000 milliLiter(s) IV Continuous <Continuous>  dextrose 50% Injectable 25 Gram(s) IV Push once  dextrose 50% Injectable 12.5 Gram(s) IV Push once  dextrose 50% Injectable 25 Gram(s) IV Push once  folic acid 1 milliGRAM(s) Oral daily  glucagon  Injectable 1 milliGRAM(s) IntraMuscular once  heparin   Injectable 5000 Unit(s) SubCutaneous every 8 hours  insulin lispro (ADMELOG) corrective regimen sliding scale   SubCutaneous three times a day before meals  insulin lispro (ADMELOG) corrective regimen sliding scale   SubCutaneous at bedtime  levothyroxine 25 MICROGram(s) Oral daily  melatonin 9 milliGRAM(s) Oral at bedtime  multivitamin 1 Tablet(s) Oral daily  simvastatin 40 milliGRAM(s) Oral at bedtime  Sodium Chloride 2% 500 milliLiter(s) 500 milliLiter(s) IV Continuous <Continuous>  sodium chloride 3%. 500 milliLiter(s) IV Continuous <Continuous>    PRN Inpatient Medications  dextrose Oral Gel 15 Gram(s) Oral once PRN    REVIEW OF SYSTEMS  --------------------------------------------------------------------------------  Gen: See HPI, generalized weakness+   Skin: No rash  Head/Eyes/Ears/Mouth: No headache  Respiratory: No dyspnea  CV: No chest pain  GI: See HPI, decreased PO intake  : See HPI,  MSK: No LE edema  Neuro: See HPI   All other systems were reviewed and are negative, except as noted.    VITALS/PHYSICAL EXAM  --------------------------------------------------------------------------------  T(C): 36.1 (11-14-22 @ 08:00), Max: 36.5 (11-13-22 @ 12:00)  HR: 75 (11-14-22 @ 08:00) (56 - 75)  BP: 110/72 (11-14-22 @ 08:00) (97/63 - 156/133)  RR: 14 (11-14-22 @ 08:00) (12 - 22)  SpO2: 100% (11-14-22 @ 08:00) (96% - 100%)  Wt(kg): --    11-13-22 @ 07:01  -  11-14-22 @ 07:00  --------------------------------------------------------  IN: 980 mL / OUT: 1935 mL / NET: -955 mL    Physical Exam:  Gen: resting  HEENT: MMM  Pulm: CTA B/L  CV: S1S2+  Abd: Soft, +BS   Ext: No LE edema B/L  Neuro: Awake, alert  : Lao catheter with dark urine+  Skin: Warm and dry	    LABS/STUDIES  --------------------------------------------------------------------------------              9.8    11.19 >-----------<  215      [11-14-22 @ 05:19]              28.7     120  |  89  |  15  ----------------------------<  159      [11-14-22 @ 05:19]  4.6   |  20  |  1.11        Ca     8.4     [11-14-22 @ 05:19]      Mg     2.00     [11-14-22 @ 05:19]      Phos  4.2     [11-14-22 @ 05:19]    Uric acid 4.4      [11-13-22 @ 23:20]  Serum Osmolality 269      [11-14-22 @ 05:19]    Creatinine Trend:  SCr 1.11 [11-14 @ 05:19]  SCr 1.02 [11-13 @ 23:20]  SCr 0.98 [11-13 @ 18:00]  SCr 1.06 [11-13 @ 10:40]  SCr 1.08 [11-13 @ 05:30]    Urinalysis - [11-14-22 @ 06:04]      Color Light Red / Appearance Slightly Turbid / SG 1.007 / pH 6.5      Gluc Negative / Ketone Negative  / Bili Negative / Urobili <2 mg/dL       Blood Large / Protein 30 mg/dL / Leuk Est Small / Nitrite Negative       / WBC 9 / Hyaline 1 / Gran  / Sq Epi  / Non Sq Epi 3 / Bacteria Negative    Urine Creatinine 11      [11-12-22 @ 02:45]  Urine Sodium 40      [11-14-22 @ 05:19]  Urine Urea Nitrogen 163.8      [11-12-22 @ 02:45]  Urine Potassium 8.8      [11-11-22 @ 23:40]  Urine Chloride 56      [11-11-22 @ 23:40]  Urine Osmolality 194      [11-14-22 @ 05:19]    TSH 13.13      [11-12-22 @ 02:40]  Lipid: chol --, , HDL --, LDL --      [11-13-22 @ 23:20]    HCV 0.06, Nonreact      [11-12-22 @ 02:40]

## 2022-11-14 NOTE — PROGRESS NOTE ADULT - ASSESSMENT
67y female PMHx HTN, HLD, DM2, iron deficiency anemia (s/p iron transfusion yesterday) presenting with generalized weakness, dizziness and lower extremity heaviness admitted to the MICU for hyponatremia.     PLAN  =====Neurologic=====  Patient is AOx3 at baseline      =====Pulmonary=====  Patient breathing comfortably on room air. No active issues. Reported SOB resolved, will monitor.     =====Cardiovascular=====  Patient does not currently require vasopressors and is normotensive. No active issues    =====GI=====  No acute issues     #Diet  - Soft and bite sized (with 1L fluid restriction)       =====Renal/=====  #Hyponatremia (Na 112, 11/12)  Likely 2/2 SIADH   - Started on 2% HTN 30cc for 4 hours on 11/13 as per nephro   - BMP q4h, Serum Sosm/Uosm   - F/u Nephro reccs   - strict monitor I/Os   - Avoid overcorrection last Na 116 on 11/13. Aim for 6-8mEq in 24 hours   - On fluid restriction diet.      =====Endocrine=====  #DM2  - ISS   - On a diet     #Hypothyroidism  - C/w current dose of synthroid    =====Infectious Disease=====  Patient is afebrile. Patient has leukocytosis, unlikely from infectious etiology. Likely 2/2 inflammation and recent diarrhea.     =====Heme/Onc=====  #DVT Prophylaxis  - Heparin 5000 subq     #Iron deficiency anemia  - Hgb stable , no active signs of bleeding   - Patient gets IV Iron outpatient as per daughter. In addition, she has tried oral Iron in past but did not tolerate it.     =====Ethics=====  FULL CODE  Speaks Danish  67y female PMHx HTN, HLD, DM2, iron deficiency anemia (s/p iron transfusion yesterday) presenting with generalized weakness, dizziness and lower extremity heaviness admitted to the MICU for hyponatremia.     PLAN  =====Neurologic=====  Patient is AOx3, at baseline    #Restless Leg Syndrome  -Likely i/s/o known iron deficiency anemia  -Starting home ropinirole 1 mg at bedtime    =====Pulmonary=====  Patient breathing comfortably on room air. No active issues. Reported SOB resolved, will monitor.     =====Cardiovascular=====  Patient does not currently require vasopressors and is normotensive. No active issues    =====GI=====  No acute issues     #Diet  - Soft and bite sized (with 1L fluid restriction)       =====Renal/=====  #Hyponatremia (Na 112, 11/12)  Likely 2/2 diarrhea, diuretic use, poor PO intake, urinary retention and SIADH   - Started on 2% and 3% NaCl boluses (30cc for 4 hours) on 11/13 as per nephro  - Noted overcorrection with increased UOP, s/p D5 fluid and desmopressin   - BMP q4h, Serum Sosm/Uosm   - F/u Nephro recs   - strict monitor I/Os   - Aim for 6-8mEq in 24 hours   -Elevated TSH with normal T3,T4. Normal cortisol. Mildly elevated TGs  - On fluid restriction diet.      #Hyperkalemia  -K 7.4 on admission, s/p Insulin, D50, Lokelma  -K normalized  -Low K diet    #CKD  -Unknown prior baseline  -F/u renal U/S  =====Endocrine=====  #DM2  - ISS   - On a CC diet     #Hypothyroidism  -Elevated TSH with normal T3, T4  - C/w current dose of synthroid      =====Infectious Disease=====  Patient is afebrile. Patient has leukocytosis, unlikely from infectious etiology. Likely 2/2 inflammation and recent diarrhea.     =====Heme/Onc=====  #DVT Prophylaxis  - Heparin 5000 subq     #Iron deficiency anemia  - Hgb stable , no active signs of bleeding   - Patient gets IV Iron outpatient as per daughter. In addition, she has tried oral Iron in past but did not tolerate it.     =====Ethics=====  FULL CODE  Speaks Romansh

## 2022-11-14 NOTE — PROGRESS NOTE ADULT - PROBLEM SELECTOR PLAN 3
Pt. with history of CKD. As per review of previous outpatient labs, Scr was elevated at 1.2 on 5/5/22 and was 1.21 on 8/18/22. Upon review of [place] [place], Scr was 1.53 on 11/11. Scr improved to 0.98 on 11/13/22. Scr is elevated/stable at  1.11 today. Pt currently non oliguric, UOP ~ 1.9L in last 24 hrs. Pt. with MAGUE on CKD in setting of recent diarrhea and urine retention. Check renal US. Monitor labs and urine output. Avoid nephrotoxins. Dose medications as per eGFR.    If you have any questions, please feel free to contact me  Ricardo Ahmadi  Nephrology Fellow  [phone]/ [employer] Teams(Preferred)  (After 5pm or on weekends please page the on-call fellow).

## 2022-11-14 NOTE — PROGRESS NOTE ADULT - ATTENDING COMMENTS
MAGUE and Hyperkalemia resolving   hyponatremia s/p correction last night. today Na and UOP are increasing rapidly   Na 116 at 1040am 11/13--->127today @130pm  please give 0.1mcg DDAVP  repeat Na every 4 hours and call nephrology for further guidance   Assessment and plan as outlined above. Monitor labs and urine output. Avoid any potential nephrotoxins. Dose medications as per eGFR.  Further detailed plan of management and recommendation as outlined above by the renal fellow.  plan discussed with MICU team

## 2022-11-14 NOTE — CHART NOTE - NSCHARTNOTEFT_GEN_A_CORE
MAR Accept Note  Transfer to:    Accepting Attending Physician:    Assigned Room:      Patient seen and examined.   Labs and data reviewed.   No findings precluding transfer of service.       HPI/MICU COURSE:   Please refer to MICU transfer note for full details. Briefly, this is a      FOR FOLLOW-UP:      Octavio Teixeira PGY3  Medicine Admitting Resident--[id number] MAR Accept Note  Transfer to: [address] [place] [address]   Accepting Attending Physician: Dr. Ackerman   Assigned Room: [address] [place] [address]     Patient seen and examined.   Labs and data reviewed.   No findings precluding transfer of service.       HPI/MICU COURSE:   Please refer to MICU transfer note for full details. Briefly, this is a 67-year-old woman who is followed for type II diabetes mellitus, hypertension, hyperlipidemia, and iron-deficiency anemia who was admitted for evaluation and management of a seizure in the setting of severe hyponatremia likely multi-factorial in the setting of acute kidney injury, possibly secondary to post-renal azotemia due to urinary retention. The patient was administered hypertonic saline at the time of initial presentation. She has received two doses of ddavp with dextrose in the setting of over-correction of her serum sodium level. Her acute kidney injury has improved following placement of a reynolds catheter. At this time, the patient is clinically stable and ready for transition of care to the medical floor.     FOR FOLLOW-UP: [ ] q4h bmp, urine osmolarity, and urine sodium in the context of hyponatremia with concern for over-correction   [ ] monitor urine output via reynolds catheter; can attempt trial of void following resolution of hyponatremia; workup of etiologies of urinary retention is ongoing   [ ] ropinirole started today for management of restless leg syndrome; ongoing evaluation of symptoms and of response to therapy     Octavio Teixeira PGY3  Medicine Admitting Resident--[id number] MAR Accept Note  Transfer to: [address] [place] [address]   Accepting Attending Physician: Dr. Ackerman   Assigned Room: [address] [place] [address]     Patient seen and examined.   Labs and data reviewed.   No findings precluding transfer of service.       HPI/MICU COURSE:   Please refer to MICU transfer note for full details. Briefly, this is a 67-year-old woman who is followed for type II diabetes mellitus, hypertension, hyperlipidemia, and iron-deficiency anemia who was admitted for evaluation and management of a seizure in the setting of severe hyponatremia likely multi-factorial in the setting of acute kidney injury, possibly secondary to post-renal azotemia due to urinary retention. The patient was administered hypertonic saline at the time of initial presentation. She has received two doses of ddavp with dextrose in the setting of over-correction of her serum sodium level. Her acute kidney injury has improved following placement of a reynolds catheter. At this time, the patient is clinically stable and ready for transition of care to the medical floor.     FOR FOLLOW-UP: [ ] q4h bmp, urine osmolarity, and urine sodium in the context of hyponatremia with concern for over-correction   [ ] monitor urine output in the context of trial of void; workup of etiologies of urinary retention is ongoing   [ ] monitor fingerstick glucose measurements and adjust treatment regimen as necessary   [ ] ropinirole started today for management of restless leg syndrome; ongoing evaluation of symptoms and of response to therapy     Octavio Teixeira PGY3  Medicine Admitting Resident--[id number]

## 2022-11-15 DIAGNOSIS — Z29.9 ENCOUNTER FOR PROPHYLACTIC MEASURES, UNSPECIFIED: ICD-10-CM

## 2022-11-15 DIAGNOSIS — I10 ESSENTIAL (PRIMARY) HYPERTENSION: ICD-10-CM

## 2022-11-15 DIAGNOSIS — E11.9 TYPE 2 DIABETES MELLITUS WITHOUT COMPLICATIONS: ICD-10-CM

## 2022-11-15 DIAGNOSIS — G25.81 RESTLESS LEGS SYNDROME: ICD-10-CM

## 2022-11-15 DIAGNOSIS — N18.9 CHRONIC KIDNEY DISEASE, UNSPECIFIED: ICD-10-CM

## 2022-11-15 LAB
ANION GAP SERPL CALC-SCNC: 10 MMOL/L — SIGNIFICANT CHANGE UP (ref 7–14)
ANION GAP SERPL CALC-SCNC: 11 MMOL/L — SIGNIFICANT CHANGE UP (ref 7–14)
ANION GAP SERPL CALC-SCNC: 12 MMOL/L — SIGNIFICANT CHANGE UP (ref 7–14)
BUN SERPL-MCNC: 19 MG/DL — SIGNIFICANT CHANGE UP (ref 7–23)
BUN SERPL-MCNC: 20 MG/DL — SIGNIFICANT CHANGE UP (ref 7–23)
BUN SERPL-MCNC: 22 MG/DL — SIGNIFICANT CHANGE UP (ref 7–23)
CALCIUM SERPL-MCNC: 8.8 MG/DL — SIGNIFICANT CHANGE UP (ref 8.4–10.5)
CHLORIDE SERPL-SCNC: 93 MMOL/L — LOW (ref 98–107)
CHLORIDE SERPL-SCNC: 94 MMOL/L — LOW (ref 98–107)
CHLORIDE SERPL-SCNC: 94 MMOL/L — LOW (ref 98–107)
CO2 SERPL-SCNC: 19 MMOL/L — LOW (ref 22–31)
CO2 SERPL-SCNC: 21 MMOL/L — LOW (ref 22–31)
CO2 SERPL-SCNC: 23 MMOL/L — SIGNIFICANT CHANGE UP (ref 22–31)
CREAT SERPL-MCNC: 1.01 MG/DL — SIGNIFICANT CHANGE UP (ref 0.5–1.3)
CREAT SERPL-MCNC: 1.06 MG/DL — SIGNIFICANT CHANGE UP (ref 0.5–1.3)
CREAT SERPL-MCNC: 1.16 MG/DL — SIGNIFICANT CHANGE UP (ref 0.5–1.3)
EGFR: 52 ML/MIN/1.73M2 — LOW
EGFR: 58 ML/MIN/1.73M2 — LOW
EGFR: 61 ML/MIN/1.73M2 — SIGNIFICANT CHANGE UP
GLUCOSE BLDC GLUCOMTR-MCNC: 161 MG/DL — HIGH (ref 70–99)
GLUCOSE BLDC GLUCOMTR-MCNC: 166 MG/DL — HIGH (ref 70–99)
GLUCOSE BLDC GLUCOMTR-MCNC: 215 MG/DL — HIGH (ref 70–99)
GLUCOSE BLDC GLUCOMTR-MCNC: 241 MG/DL — HIGH (ref 70–99)
GLUCOSE SERPL-MCNC: 171 MG/DL — HIGH (ref 70–99)
GLUCOSE SERPL-MCNC: 176 MG/DL — HIGH (ref 70–99)
GLUCOSE SERPL-MCNC: 229 MG/DL — HIGH (ref 70–99)
HCT VFR BLD CALC: 31.1 % — LOW (ref 34.5–45)
HGB BLD-MCNC: 10.6 G/DL — LOW (ref 11.5–15.5)
MAGNESIUM SERPL-MCNC: 2.2 MG/DL — SIGNIFICANT CHANGE UP (ref 1.6–2.6)
MCHC RBC-ENTMCNC: 28.6 PG — SIGNIFICANT CHANGE UP (ref 27–34)
MCHC RBC-ENTMCNC: 34.1 GM/DL — SIGNIFICANT CHANGE UP (ref 32–36)
MCV RBC AUTO: 84.1 FL — SIGNIFICANT CHANGE UP (ref 80–100)
MRSA PCR RESULT.: SIGNIFICANT CHANGE UP
NRBC # BLD: 0 /100 WBCS — SIGNIFICANT CHANGE UP (ref 0–0)
NRBC # FLD: 0 K/UL — SIGNIFICANT CHANGE UP (ref 0–0)
OSMOLALITY UR: 651 MOSM/KG — SIGNIFICANT CHANGE UP (ref 50–1200)
PHOSPHATE SERPL-MCNC: 3.8 MG/DL — SIGNIFICANT CHANGE UP (ref 2.5–4.5)
PLATELET # BLD AUTO: 248 K/UL — SIGNIFICANT CHANGE UP (ref 150–400)
POTASSIUM SERPL-MCNC: 4.4 MMOL/L — SIGNIFICANT CHANGE UP (ref 3.5–5.3)
POTASSIUM SERPL-MCNC: 4.8 MMOL/L — SIGNIFICANT CHANGE UP (ref 3.5–5.3)
POTASSIUM SERPL-MCNC: 4.9 MMOL/L — SIGNIFICANT CHANGE UP (ref 3.5–5.3)
POTASSIUM SERPL-SCNC: 4.4 MMOL/L — SIGNIFICANT CHANGE UP (ref 3.5–5.3)
POTASSIUM SERPL-SCNC: 4.8 MMOL/L — SIGNIFICANT CHANGE UP (ref 3.5–5.3)
POTASSIUM SERPL-SCNC: 4.9 MMOL/L — SIGNIFICANT CHANGE UP (ref 3.5–5.3)
RBC # BLD: 3.7 M/UL — LOW (ref 3.8–5.2)
RBC # FLD: 13.6 % — SIGNIFICANT CHANGE UP (ref 10.3–14.5)
S AUREUS DNA NOSE QL NAA+PROBE: DETECTED
SODIUM SERPL-SCNC: 125 MMOL/L — LOW (ref 135–145)
SODIUM SERPL-SCNC: 126 MMOL/L — LOW (ref 135–145)
SODIUM SERPL-SCNC: 126 MMOL/L — LOW (ref 135–145)
SODIUM UR-SCNC: 113 MMOL/L — SIGNIFICANT CHANGE UP
WBC # BLD: 10.66 K/UL — HIGH (ref 3.8–10.5)
WBC # FLD AUTO: 10.66 K/UL — HIGH (ref 3.8–10.5)

## 2022-11-15 PROCEDURE — 99232 SBSQ HOSP IP/OBS MODERATE 35: CPT | Mod: GC

## 2022-11-15 PROCEDURE — 99233 SBSQ HOSP IP/OBS HIGH 50: CPT | Mod: GC

## 2022-11-15 PROCEDURE — 95720 EEG PHY/QHP EA INCR W/VEEG: CPT

## 2022-11-15 RX ADMIN — Medication 25 MICROGRAM(S): at 05:21

## 2022-11-15 RX ADMIN — Medication 2: at 18:12

## 2022-11-15 RX ADMIN — Medication 1 TABLET(S): at 13:07

## 2022-11-15 RX ADMIN — CHLORHEXIDINE GLUCONATE 1 APPLICATION(S): 213 SOLUTION TOPICAL at 05:20

## 2022-11-15 RX ADMIN — ATENOLOL 50 MILLIGRAM(S): 25 TABLET ORAL at 05:20

## 2022-11-15 RX ADMIN — Medication 9 MILLIGRAM(S): at 21:28

## 2022-11-15 RX ADMIN — Medication 1: at 09:06

## 2022-11-15 RX ADMIN — Medication 1 MILLIGRAM(S): at 13:01

## 2022-11-15 RX ADMIN — Medication 2: at 13:01

## 2022-11-15 RX ADMIN — SIMVASTATIN 40 MILLIGRAM(S): 20 TABLET, FILM COATED ORAL at 21:28

## 2022-11-15 RX ADMIN — HEPARIN SODIUM 5000 UNIT(S): 5000 INJECTION INTRAVENOUS; SUBCUTANEOUS at 13:01

## 2022-11-15 RX ADMIN — HEPARIN SODIUM 5000 UNIT(S): 5000 INJECTION INTRAVENOUS; SUBCUTANEOUS at 21:28

## 2022-11-15 RX ADMIN — HEPARIN SODIUM 5000 UNIT(S): 5000 INJECTION INTRAVENOUS; SUBCUTANEOUS at 05:21

## 2022-11-15 NOTE — PROGRESS NOTE ADULT - ATTENDING COMMENTS
67 year old female with HTN, DM2, ZIGGY (s/p iron transfusion), p/w generalized weakness, dizziness, and suspected seizure at home found to have Na 112, admitted to MICU, now downgraded to medical floors. HypoNa suspected in setting of diarrhea and urinary retention, appreciate Renal input, now up to 125, c/w q6hr BMP checks and fluid restriction. No further seizure like activity here, no prior hx of seizures, CTH unremarkable, will check EEG. Daughter updated at bedside.

## 2022-11-15 NOTE — PROGRESS NOTE ADULT - PROBLEM SELECTOR PLAN 2
Pt admitted with hyperkalemia in the setting of CKD, Losartan, Finerinone, hyperglycemia and likely urine retention. Serum potassium was elevated at 7.4 on 11/11. Pt received medical management. Continue to hold Losartan and Finerinone. Serum potassium WNL (4.8) today. Low K diet. Monitor serum potassium.

## 2022-11-15 NOTE — PROGRESS NOTE ADULT - PROBLEM SELECTOR PLAN 1
Pt admitted with symptomatic hypo-osmolar euvolemic hyponatremia in the setting of diarrhea and urine retention. Pt. received IV HTS 3% on 11/11. SNa improved to 116 on AM labs done on 11/12/22. Pt. received desmopressin by primary medical/ICU team for high urine output (after Lao catheter placement). Pt. with history of hyponatremia in the past (2015). SNa however WNL on outpatient labs done in May and August 2022. SOsm was low at 267, UOsm was 209, and Tyler inappropriately elevated at 54. Pt with increased UOP after Lao catheter placement, suggesting of urine retention. Pt received D5W on 11/12/22 with concerns for rapid correction. SNa was low at 118 on 11/13/22. Received  2% HTS at 30 cc/hr x 4 hours. Sna was low/stable at 120 in AM on 11/14/22. Repeat SNa improved to 127 in PM on 11/14/22. Pt received DDAVP 2 mcg IV on 11/14/22. Sna is stable at 125 today (11/15/22). Recommend repeat labs in 6 hrs (at noon). Monitor SNa q8 hours. Avoid hypotonic fluids. Fluid restriction < 1L. Avoid overcorrection (not more than 6-8 mEq/L in 24 hours).

## 2022-11-15 NOTE — PROGRESS NOTE ADULT - ASSESSMENT
67y female PMHx HTN, HLD, DM2, iron deficiency anemia (s/p iron transfusion yesterday) presenting with generalized weakness, dizziness and lower extremity heaviness admitted to the MICU for hyponatremia. Transferred to floors with improving hyponatremia, s/p reynolds catheter removal with passed TOV. 67 year old female PMHx HTN, HLD, DM2, iron deficiency anemia (s/p iron transfusion yesterday) presenting with generalized weakness, dizziness and lower extremity heaviness admitted to the MICU for hyponatremia. Transferred to floors with improving hyponatremia, s/p reynolds catheter removal with passed TOV.

## 2022-11-15 NOTE — PROGRESS NOTE ADULT - PROBLEM SELECTOR PLAN 5
- A1C of 7.9 on 11/12  - C/W ISS at meals and bedtime  - CC diet - BPs have been stable, holding home meds at current time

## 2022-11-15 NOTE — PROGRESS NOTE ADULT - SUBJECTIVE AND OBJECTIVE BOX
Medicine Progress Note    William Osorio MD  PGY1 Internal Medicine  Available on TEAMS  [facility]: [phone], [facility]: [id number]    Patient is a 67y old  Female who presents with a chief complaint of Hyponatremia (2022 07:20)      SUBJECTIVE / OVERNIGHT EVENTS: This AM, patient seen and examined at bedside.      MEDICATIONS  (STANDING):  atenolol  Tablet 50 milliGRAM(s) Oral daily  chlorhexidine 2% Cloths 1 Application(s) Topical <User Schedule>  dextrose 5%. 1000 milliLiter(s) (100 mL/Hr) IV Continuous <Continuous>  dextrose 5%. 1000 milliLiter(s) (50 mL/Hr) IV Continuous <Continuous>  dextrose 50% Injectable 25 Gram(s) IV Push once  dextrose 50% Injectable 12.5 Gram(s) IV Push once  dextrose 50% Injectable 25 Gram(s) IV Push once  folic acid 1 milliGRAM(s) Oral daily  glucagon  Injectable 1 milliGRAM(s) IntraMuscular once  heparin   Injectable 5000 Unit(s) SubCutaneous every 8 hours  insulin lispro (ADMELOG) corrective regimen sliding scale   SubCutaneous three times a day before meals  insulin lispro (ADMELOG) corrective regimen sliding scale   SubCutaneous at bedtime  levothyroxine 25 MICROGram(s) Oral daily  melatonin 9 milliGRAM(s) Oral at bedtime  multivitamin 1 Tablet(s) Oral daily  rOPINIRole 1 milliGRAM(s) Oral at bedtime  simvastatin 40 milliGRAM(s) Oral at bedtime    MEDICATIONS  (PRN):  dextrose Oral Gel 15 Gram(s) Oral once PRN Blood Glucose LESS THAN 70 milliGRAM(s)/deciliter    CAPILLARY BLOOD GLUCOSE      POCT Blood Glucose.: 212 mg/dL (2022 22:01)  POCT Blood Glucose.: 167 mg/dL (2022 17:24)  POCT Blood Glucose.: 220 mg/dL (2022 12:18)  POCT Blood Glucose.: 174 mg/dL (2022 08:04)    I&O's Summary    2022 07:01  -  15 Nov 2022 07:00  --------------------------------------------------------  IN: 0 mL / OUT: 1050 mL / NET: -1050 mL        PHYSICAL EXAM:  Vital Signs Last 24 Hrs  T(C): 36.6 (15 Nov 2022 05:08), Max: 36.6 (15 Nov 2022 05:08)  T(F): 97.8 (15 Nov 2022 05:08), Max: 97.8 (15 Nov 2022 05:08)  HR: 60 (15 Nov 2022 05:08) (57 - 85)  BP: 130/60 (15 Nov 2022 05:08) (109/38 - 139/106)  BP(mean): 73 (2022 20:00) (55 - 115)  RR: 18 (15 Nov 2022 05:08) (10 - 19)  SpO2: 100% (15 Nov 2022 05:08) (100% - 100%)    Parameters below as of 15 Nov 2022 05:08  Patient On (Oxygen Delivery Method): room air      CONSTITUTIONAL: NAD, well-developed, well-groomed  HEENT: Moist oral mucosa, no pharyngeal injection or exudates  RESPIRATORY: Normal respiratory effort; lungs are clear to auscultation bilaterally  CARDIOVASCULAR: Regular rate and rhythm, normal S1 and S2, no murmur/rub/gallop, no lower extremity edema, peripheral pulses are 2+ bilaterally  ABDOMEN: Soft, nondistended, nontender to palpation, normoactive bowel sounds, no rebound/guarding  PSYCH: A+O to person, place, and time  NEUROLOGY: Facial expression appears symmetric, no gross sensory deficits appreciated, moving all extremities spontaneously  SKIN: No rashes; no palpable lesions    LABS:                        9.8    11.19 )-----------( 215      ( 2022 05:19 )             28.7     11-14    124<L>  |  92<L>  |  20  ----------------------------<  227<H>  4.7   |  18<L>  |  1.11    Ca    8.6      2022 23:00  Phos  4.2     11-14  Mg     2.10     11-14              Urinalysis Basic - ( 2022 06:04 )    Color: Light Red / Appearance: Slightly Turbid / S.007 / pH: x  Gluc: x / Ketone: Negative  / Bili: Negative / Urobili: <2 mg/dL   Blood: x / Protein: 30 mg/dL / Nitrite: Negative   Leuk Esterase: Small / RBC: 470 /HPF / WBC 9 /HPF   Sq Epi: x / Non Sq Epi: 3 /HPF / Bacteria: Negative        SARS-CoV-2: NotDetec (2022 23:38)      RADIOLOGY & ADDITIONAL TESTS:  Imaging from Last 24 Hours: Medicine Progress Note    William Osorio MD  PGY1 Internal Medicine  Available on TEAMS  [facility]: [phone], [facility]: [id number]    Patient is a 67y old  Female who presents with a chief complaint of Hyponatremia (2022 07:20)      SUBJECTIVE / OVERNIGHT EVENTS: Downgrade from MICU to floors with improving hyponatremia. This AM, patient seen and examined at bedside. Patient's daughter at bedside provides translation. Patient denies chest pain, shortness of breath. Tolerating diet without nausea or vomiting. No abdominal pain. Urinating without issue after removal of Lao catheter.      MEDICATIONS  (STANDING):  atenolol  Tablet 50 milliGRAM(s) Oral daily  chlorhexidine 2% Cloths 1 Application(s) Topical <User Schedule>  dextrose 5%. 1000 milliLiter(s) (100 mL/Hr) IV Continuous <Continuous>  dextrose 5%. 1000 milliLiter(s) (50 mL/Hr) IV Continuous <Continuous>  dextrose 50% Injectable 25 Gram(s) IV Push once  dextrose 50% Injectable 12.5 Gram(s) IV Push once  dextrose 50% Injectable 25 Gram(s) IV Push once  folic acid 1 milliGRAM(s) Oral daily  glucagon  Injectable 1 milliGRAM(s) IntraMuscular once  heparin   Injectable 5000 Unit(s) SubCutaneous every 8 hours  insulin lispro (ADMELOG) corrective regimen sliding scale   SubCutaneous three times a day before meals  insulin lispro (ADMELOG) corrective regimen sliding scale   SubCutaneous at bedtime  levothyroxine 25 MICROGram(s) Oral daily  melatonin 9 milliGRAM(s) Oral at bedtime  multivitamin 1 Tablet(s) Oral daily  rOPINIRole 1 milliGRAM(s) Oral at bedtime  simvastatin 40 milliGRAM(s) Oral at bedtime    MEDICATIONS  (PRN):  dextrose Oral Gel 15 Gram(s) Oral once PRN Blood Glucose LESS THAN 70 milliGRAM(s)/deciliter    CAPILLARY BLOOD GLUCOSE      POCT Blood Glucose.: 212 mg/dL (2022 22:01)  POCT Blood Glucose.: 167 mg/dL (2022 17:24)  POCT Blood Glucose.: 220 mg/dL (2022 12:18)  POCT Blood Glucose.: 174 mg/dL (2022 08:04)    I&O's Summary    2022 07:01  -  15 Nov 2022 07:00  --------------------------------------------------------  IN: 0 mL / OUT: 1050 mL / NET: -1050 mL        PHYSICAL EXAM:  Vital Signs Last 24 Hrs  T(C): 36.6 (15 Nov 2022 05:08), Max: 36.6 (15 Nov 2022 05:08)  T(F): 97.8 (15 Nov 2022 05:08), Max: 97.8 (15 Nov 2022 05:08)  HR: 60 (15 Nov 2022 05:08) (57 - 85)  BP: 130/60 (15 Nov 2022 05:08) (109/38 - 139/106)  BP(mean): 73 (2022 20:00) (55 - 115)  RR: 18 (15 Nov 2022 05:) (10 - 19)  SpO2: 100% (15 Nov 2022 05:08) (100% - 100%)    Parameters below as of 15 Nov 2022 05:08  Patient On (Oxygen Delivery Method): room air      CONSTITUTIONAL: NAD, well-developed, well-groomed  HEENT: Moist oral mucosa, no pharyngeal injection or exudates  RESPIRATORY: Normal respiratory effort; lungs are clear to auscultation bilaterally  CARDIOVASCULAR: Regular rate and rhythm, normal S1 and S2, no murmur/rub/gallop, no lower extremity edema, peripheral pulses are 2+ bilaterally  ABDOMEN: Soft, nondistended, nontender to palpation, normoactive bowel sounds, no rebound/guarding  PSYCH: A+O to person, place, and time  NEUROLOGY: Facial expression appears symmetric, no gross sensory deficits appreciated, moving all extremities spontaneously  SKIN: No rashes; no palpable lesions    LABS:                        9.8    11.19 )-----------( 215      ( 2022 05:19 )             28.7     11-14    124<L>  |  92<L>  |  20  ----------------------------<  227<H>  4.7   |  18<L>  |  1.11    Ca    8.6      2022 23:00  Phos  4.2     11-14  Mg     2.10     11-14              Urinalysis Basic - ( 2022 06:04 )    Color: Light Red / Appearance: Slightly Turbid / S.007 / pH: x  Gluc: x / Ketone: Negative  / Bili: Negative / Urobili: <2 mg/dL   Blood: x / Protein: 30 mg/dL / Nitrite: Negative   Leuk Esterase: Small / RBC: 470 /HPF / WBC 9 /HPF   Sq Epi: x / Non Sq Epi: 3 /HPF / Bacteria: Negative        SARS-CoV-2: NotDetec (2022 23:38)      RADIOLOGY & ADDITIONAL TESTS:  Imaging from Last 24 Hours: Medicine Progress Note    William Osorio MD  PGY1 Internal Medicine  Available on TEAMS  [facility]: [phone], [facility]: [id number]    Patient is a 67y old  Female who presents with a chief complaint of Hyponatremia (2022 07:20)      SUBJECTIVE / OVERNIGHT EVENTS: Downgrade from MICU to floors with improving hyponatremia. This AM, patient seen and examined at bedside. Patient's daughter at bedside provides translation. Patient denies chest pain, shortness of breath. Tolerating diet without nausea or vomiting. No abdominal pain. Urinating without issue after removal of Lao catheter.      MEDICATIONS  (STANDING):  atenolol  Tablet 50 milliGRAM(s) Oral daily  chlorhexidine 2% Cloths 1 Application(s) Topical <User Schedule>  dextrose 5%. 1000 milliLiter(s) (100 mL/Hr) IV Continuous <Continuous>  dextrose 5%. 1000 milliLiter(s) (50 mL/Hr) IV Continuous <Continuous>  dextrose 50% Injectable 25 Gram(s) IV Push once  dextrose 50% Injectable 12.5 Gram(s) IV Push once  dextrose 50% Injectable 25 Gram(s) IV Push once  folic acid 1 milliGRAM(s) Oral daily  glucagon  Injectable 1 milliGRAM(s) IntraMuscular once  heparin   Injectable 5000 Unit(s) SubCutaneous every 8 hours  insulin lispro (ADMELOG) corrective regimen sliding scale   SubCutaneous three times a day before meals  insulin lispro (ADMELOG) corrective regimen sliding scale   SubCutaneous at bedtime  levothyroxine 25 MICROGram(s) Oral daily  melatonin 9 milliGRAM(s) Oral at bedtime  multivitamin 1 Tablet(s) Oral daily  rOPINIRole 1 milliGRAM(s) Oral at bedtime  simvastatin 40 milliGRAM(s) Oral at bedtime    MEDICATIONS  (PRN):  dextrose Oral Gel 15 Gram(s) Oral once PRN Blood Glucose LESS THAN 70 milliGRAM(s)/deciliter    CAPILLARY BLOOD GLUCOSE      POCT Blood Glucose.: 212 mg/dL (2022 22:01)  POCT Blood Glucose.: 167 mg/dL (2022 17:24)  POCT Blood Glucose.: 220 mg/dL (2022 12:18)  POCT Blood Glucose.: 174 mg/dL (2022 08:04)    I&O's Summary    2022 07:01  -  15 Nov 2022 07:00  --------------------------------------------------------  IN: 0 mL / OUT: 1050 mL / NET: -1050 mL        PHYSICAL EXAM:  Vital Signs Last 24 Hrs  T(C): 36.6 (15 Nov 2022 05:08), Max: 36.6 (15 Nov 2022 05:08)  T(F): 97.8 (15 Nov 2022 05:08), Max: 97.8 (15 Nov 2022 05:08)  HR: 60 (15 Nov 2022 05:08) (57 - 85)  BP: 130/60 (15 Nov 2022 05:08) (109/38 - 139/106)  BP(mean): 73 (2022 20:00) (55 - 115)  RR: 18 (15 Nov 2022 05:) (10 - 19)  SpO2: 100% (15 Nov 2022 05:08) (100% - 100%)    Parameters below as of 15 Nov 2022 05:08  Patient On (Oxygen Delivery Method): room air      CONSTITUTIONAL: NAD, well-developed, well-groomed  HEENT: Moist oral mucosa, no pharyngeal injection or exudates  RESPIRATORY: Normal respiratory effort; lungs are clear to auscultation bilaterally  CARDIOVASCULAR: Regular rate and rhythm, normal S1 and S2, no murmur/rub/gallop, no lower extremity edema, peripheral pulses are 2+ bilaterally  ABDOMEN: Soft, nondistended, nontender to palpation, normoactive bowel sounds, no rebound/guarding  PSYCH: A+O to person, place, and time  NEUROLOGY: Facial expression appears symmetric, no gross sensory deficits appreciated, moving all extremities spontaneously  SKIN: No rashes; no palpable lesions    LABS:                        9.8    11.19 )-----------( 215      ( 2022 05:19 )             28.7     11-14    124<L>  |  92<L>  |  20  ----------------------------<  227<H>  4.7   |  18<L>  |  1.11    Ca    8.6      2022 23:00  Phos  4.2     -  Mg     2.10     -14              Urinalysis Basic - ( 2022 06:04 )    Color: Light Red / Appearance: Slightly Turbid / S.007 / pH: x  Gluc: x / Ketone: Negative  / Bili: Negative / Urobili: <2 mg/dL   Blood: x / Protein: 30 mg/dL / Nitrite: Negative   Leuk Esterase: Small / RBC: 470 /HPF / WBC 9 /HPF   Sq Epi: x / Non Sq Epi: 3 /HPF / Bacteria: Negative        SARS-CoV-2: NotDetec (2022 23:38)      RADIOLOGY & ADDITIONAL TESTS:  Imaging from Last 24 Hours:  < from: US Kidney and Bladder (22 @ 14:01) >  ACC: [id number] EXAM:  US KIDNEYS AND BLADDER                          PROCEDURE DATE:  2022          INTERPRETATION:  CLINICAL INFORMATION: Chronic kidney disease    COMPARISON: CT abdomen/pelvis dated 2022    TECHNIQUE: Sonography of the kidneys and bladder.    FINDINGS:  Right kidney: 9.0 cm. No renal mass, hydronephrosis or calculi. Increased   echogenicity.    Left kidney: 8.9 cm. No renal mass, hydronephrosis or calculi. Increased   echogenicity.    Urinary bladder: Collapsed around Lao catheter.    IMPRESSION:  No hydronephrosis.    Increased renal echogenicity which may reflect medical renal disease.    --- End of Report ---      < end of copied text >   Medicine Progress Note    William Osorio MD  PGY1 Internal Medicine  Available on TEAMS  [facility]: [phone], [facility]: [id number]    Patient is a 67y old  Female who presents with a chief complaint of Hyponatremia (2022 07:20)      SUBJECTIVE / OVERNIGHT EVENTS: Downgrade from MICU to floors with improving hyponatremia. This AM, patient seen and examined at bedside. Patient's daughter at bedside provides translation. Patient denies chest pain, shortness of breath. Tolerating diet without nausea or vomiting. No abdominal pain. Urinating without issue after removal of Lao catheter.      MEDICATIONS  (STANDING):  atenolol  Tablet 50 milliGRAM(s) Oral daily  chlorhexidine 2% Cloths 1 Application(s) Topical <User Schedule>  dextrose 5%. 1000 milliLiter(s) (100 mL/Hr) IV Continuous <Continuous>  dextrose 5%. 1000 milliLiter(s) (50 mL/Hr) IV Continuous <Continuous>  dextrose 50% Injectable 25 Gram(s) IV Push once  dextrose 50% Injectable 12.5 Gram(s) IV Push once  dextrose 50% Injectable 25 Gram(s) IV Push once  folic acid 1 milliGRAM(s) Oral daily  glucagon  Injectable 1 milliGRAM(s) IntraMuscular once  heparin   Injectable 5000 Unit(s) SubCutaneous every 8 hours  insulin lispro (ADMELOG) corrective regimen sliding scale   SubCutaneous three times a day before meals  insulin lispro (ADMELOG) corrective regimen sliding scale   SubCutaneous at bedtime  levothyroxine 25 MICROGram(s) Oral daily  melatonin 9 milliGRAM(s) Oral at bedtime  multivitamin 1 Tablet(s) Oral daily  rOPINIRole 1 milliGRAM(s) Oral at bedtime  simvastatin 40 milliGRAM(s) Oral at bedtime    MEDICATIONS  (PRN):  dextrose Oral Gel 15 Gram(s) Oral once PRN Blood Glucose LESS THAN 70 milliGRAM(s)/deciliter    CAPILLARY BLOOD GLUCOSE      POCT Blood Glucose.: 212 mg/dL (2022 22:01)  POCT Blood Glucose.: 167 mg/dL (2022 17:24)  POCT Blood Glucose.: 220 mg/dL (2022 12:18)  POCT Blood Glucose.: 174 mg/dL (2022 08:04)    I&O's Summary    2022 07:01  -  15 Nov 2022 07:00  --------------------------------------------------------  IN: 0 mL / OUT: 1050 mL / NET: -1050 mL    PHYSICAL EXAM:  Vital Signs Last 24 Hrs  T(C): 36.6 (15 Nov 2022 05:08), Max: 36.6 (15 Nov 2022 05:08)  T(F): 97.8 (15 Nov 2022 05:08), Max: 97.8 (15 Nov 2022 05:08)  HR: 60 (15 Nov 2022 05:08) (57 - 85)  BP: 130/60 (15 Nov 2022 05:08) (109/38 - 139/106)  BP(mean): 73 (2022 20:00) (55 - 115)  RR: 18 (15 Nov 2022 05:) (10 - 19)  SpO2: 100% (15 Nov 2022 05:08) (100% - 100%)    Parameters below as of 15 Nov 2022 05:08  Patient On (Oxygen Delivery Method): room air    CONSTITUTIONAL: NAD, well-developed, well-groomed  HEENT: Moist oral mucosa, no pharyngeal injection or exudates  RESPIRATORY: Normal respiratory effort; lungs are clear to auscultation bilaterally  CARDIOVASCULAR: Regular rate and rhythm, normal S1 and S2, no murmur/rub/gallop, no lower extremity edema, peripheral pulses are 2+ bilaterally  ABDOMEN: Soft, nondistended, nontender to palpation, normoactive bowel sounds, no rebound/guarding  PSYCH: A+O to person, place, and time  NEUROLOGY: Facial expression appears symmetric, no gross sensory deficits appreciated, moving all extremities spontaneously  SKIN: No rashes; no palpable lesions    LABS:                        9.8    11.19 )-----------( 215      ( 2022 05:19 )             28.7     11-14    124<L>  |  92<L>  |  20  ----------------------------<  227<H>  4.7   |  18<L>  |  1.11    Ca    8.6      2022 23:00  Phos  4.2     -  Mg     2.10     -14    Urinalysis Basic - ( 2022 06:04 )    Color: Light Red / Appearance: Slightly Turbid / S.007 / pH: x  Gluc: x / Ketone: Negative  / Bili: Negative / Urobili: <2 mg/dL   Blood: x / Protein: 30 mg/dL / Nitrite: Negative   Leuk Esterase: Small / RBC: 470 /HPF / WBC 9 /HPF   Sq Epi: x / Non Sq Epi: 3 /HPF / Bacteria: Negative    SARS-CoV-2: NotDetec (2022 23:38)    RADIOLOGY & ADDITIONAL TESTS:  Imaging from Last 24 Hours:  < from: US Kidney and Bladder (22 @ 14:01) >  ACC: [id number] EXAM:  US KIDNEYS AND BLADDER                          PROCEDURE DATE:  2022      INTERPRETATION:  CLINICAL INFORMATION: Chronic kidney disease    COMPARISON: CT abdomen/pelvis dated 2022    TECHNIQUE: Sonography of the kidneys and bladder.    FINDINGS:  Right kidney: 9.0 cm. No renal mass, hydronephrosis or calculi. Increased   echogenicity.    Left kidney: 8.9 cm. No renal mass, hydronephrosis or calculi. Increased   echogenicity.    Urinary bladder: Collapsed around Lao catheter.    IMPRESSION:  No hydronephrosis.    Increased renal echogenicity which may reflect medical renal disease.    --- End of Report ---    < end of copied text >

## 2022-11-15 NOTE — PROGRESS NOTE ADULT - PROBLEM SELECTOR PLAN 2
- Baseline per outpatient appears to be around 1.2 in May and August  - Presented with 1.53 Cr on 11/11  - Likely iso her volume depleted status given endorsed history of diarrhea and iso urinary retention possibly due to obstruction. Had post-obstructive diuresis following reynolds placement  - Patient now s/p reynolds removal, voiding freely  - Cr this AM of 1.06, improving from admission - Patient prior to hospital admission noted to become unresponsive with associated arm and eye twitching lasting for 1-2 minutes with incontinence of bowels  - Likely precipitating factor was patient's hyponatremia, but will assess further with vEEG  - Will hold off on neuro consult for now, unless abnormality seen on vEEG

## 2022-11-15 NOTE — PROGRESS NOTE ADULT - PROBLEM SELECTOR PLAN 4
- BPs have been stable, holding home meds at current time - US kidney and bladder demonstrating no hydronephrosis but increased echogenicity suggestive of chronic disease  - Baseline per outpatient appears to be around 1.2 in May and August  - Had previously been on Finerenone and Losartan, holding for now  - Nephrology following

## 2022-11-15 NOTE — PROGRESS NOTE ADULT - PROBLEM SELECTOR PLAN 3
- US kidney and bladder demonstrating no hydronephrosis but increased echogenicity suggestive of chronic disease  - Baseline per outpatient appears to be around 1.2 in May and August  - Had previously been on Finerenone and Losartan, holding for now  - Nephrology following - Baseline per outpatient appears to be around 1.2 in May and August  - Presented with 1.53 Cr on 11/11  - Likely iso her volume depleted status given endorsed history of diarrhea and iso urinary retention possibly due to obstruction. Had post-obstructive diuresis following reynolds placement  - Patient now s/p reynolds removal, voiding freely  - Cr this AM of 1.06, improving from admission

## 2022-11-15 NOTE — PROGRESS NOTE ADULT - PROBLEM SELECTOR PLAN 6
- DVT: Heparin 5000U subq q8hr  - Diet: CC diet, renal, halal, 1L fluid restriction - A1C of 7.9 on 11/12  - C/W ISS at meals and bedtime  - CC diet

## 2022-11-15 NOTE — PROGRESS NOTE ADULT - PROBLEM SELECTOR PLAN 3
Pt. with history of CKD. As per review of previous outpatient labs, Scr was elevated at 1.2 on 5/5/22 and was 1.21 on 8/18/22. Upon review of [facility] [place], Scr was 1.53 on 11/11. Scr improved to 0.98 on 11/13/22. Scr is elevated/stable at  1.06 today. Pt currently non oliguric, UOP ~ 1L in last 24 hrs. Kidney sonogram showing increased echogenecity suggestive of chronic medical disease but no hydronephrosis . Pt. with MAGUE on CKD in setting of recent diarrhea and urine retention. Monitor labs and urine output. Avoid nephrotoxins. Dose medications as per eGFR.    If you have any questions, please feel free to contact me  Ricardo Ahmadi  Nephrology Fellow  [phone]/ [employer] Teams(Preferred)  (After 5pm or on weekends please page the on-call fellow).

## 2022-11-15 NOTE — PROGRESS NOTE ADULT - SUBJECTIVE AND OBJECTIVE BOX
[facility] DIVISION OF KIDNEY DISEASES AND HYPERTENSION -- FOLLOW UP NOTE  --------------------------------------------------------------------------------  HPI: 67-year-old female with history of CKD, HTN, DM, HLD, and iron deficiency anemia presented with weakness and dizziness. Pt. being seen for hyponatremia, hyperkalemia and elevated Scr level.     Upon review of labs on [facility], SNa was initially 112 on 11/11. Pt received 150 cc bolus of 3% hypertonic saline on 11/11. SNa improved to 116 on early AM labs done on 11/12/22. Pt. received desmopressin by primary medical team for high urine output. Pt received hypotonic fluids (D5W) on 11/12/22 for overcorrection of SNa. SNa was low at 118 on 11/13/22. Received  2% HTS at 30 cc/hr x 4 hours. Sna was low/stable at 120 in AM on 11/14/22. Subsequently SNa improved to 127 in PM on 11/14/22. Received DDAVP 2 mcg IV once. SNa is stable at 125 today (11/15/22).     As per review of previous labs (provided by daughter), Scr was 1.2 on 5/5/22 and was 1.21 on 8/18/22. SNa was WNL at 140 on 8/18/22. Pt has had history of hyponatremia in 2015 during hospitalization at [facility]. As per daughters, pt. was made aware of her kidney disease by her PCP since last ~2 months. Baseline Scr level however unknown and pt has not seen a nephrologist as outpatient in the past.      Pt seen and examined today, daughter at bedside. Pt complaining of sleep problems overnight and decreased oral intake. Also complained of R leg pain. No fever, chest pain, SOB, abdominal pain or HA at time of evaluation. Sna improved to     PAST HISTORY  --------------------------------------------------------------------------------  No significant changes to PMH, PSH, FHx, SHx, unless otherwise noted    ALLERGIES & MEDICATIONS  --------------------------------------------------------------------------------  Allergies    No Known Allergies    Intolerances    Standing Inpatient Medications  atenolol  Tablet 50 milliGRAM(s) Oral daily  chlorhexidine 2% Cloths 1 Application(s) Topical <User Schedule>  dextrose 5%. 1000 milliLiter(s) IV Continuous <Continuous>  dextrose 5%. 1000 milliLiter(s) IV Continuous <Continuous>  dextrose 50% Injectable 25 Gram(s) IV Push once  dextrose 50% Injectable 12.5 Gram(s) IV Push once  dextrose 50% Injectable 25 Gram(s) IV Push once  folic acid 1 milliGRAM(s) Oral daily  glucagon  Injectable 1 milliGRAM(s) IntraMuscular once  heparin   Injectable 5000 Unit(s) SubCutaneous every 8 hours  insulin lispro (ADMELOG) corrective regimen sliding scale   SubCutaneous three times a day before meals  insulin lispro (ADMELOG) corrective regimen sliding scale   SubCutaneous at bedtime  levothyroxine 25 MICROGram(s) Oral daily  melatonin 9 milliGRAM(s) Oral at bedtime  multivitamin 1 Tablet(s) Oral daily  rOPINIRole 1 milliGRAM(s) Oral at bedtime  simvastatin 40 milliGRAM(s) Oral at bedtime    PRN Inpatient Medications  dextrose Oral Gel 15 Gram(s) Oral once PRN    REVIEW OF SYSTEMS  --------------------------------------------------------------------------------  Gen: No fevers   Respiratory: No dyspnea  CV: No chest pain  GI: No abdominal pain  : No dysuria  MSK: No  edema  Neuro: no dizziness  All other systems were reviewed and are negative, except as noted.    VITALS/PHYSICAL EXAM  --------------------------------------------------------------------------------  T(C): 36.6 (11-15-22 @ 05:08), Max: 36.6 (11-15-22 @ 05:08)  HR: 60 (11-15-22 @ 05:08) (57 - 85)  BP: 130/60 (11-15-22 @ 05:08) (109/38 - 139/106)  RR: 18 (11-15-22 @ 05:08) (12 - 19)  SpO2: 100% (11-15-22 @ 05:08) (100% - 100%)  Wt(kg): --    11-14-22 @ 07:01  -  11-15-22 @ 07:00  --------------------------------------------------------  IN: 0 mL / OUT: 1050 mL / NET: -1050 mL    Physical Exam:  Gen: resting  HEENT: MMM  Pulm: CTA B/L  CV: S1S2+  Abd: Soft, +BS   Ext: No LE edema B/L  Neuro: Awake, alert  : Lao catheter with dark urine+  Skin: Warm and dry	    LABS/STUDIES  --------------------------------------------------------------------------------              10.6   10.66 >-----------<  248      [11-15-22 @ 06:30]              31.1     125  |  94  |  19  ----------------------------<  171      [11-15-22 @ 06:30]  4.8   |  19  |  1.06        Ca     8.8     [11-15-22 @ 06:30]      Mg     2.20     [11-15-22 @ 06:30]      Phos  3.8     [11-15-22 @ 06:30]    Uric acid 4.4      [11-13-22 @ 23:20]  Serum Osmolality 281      [11-14-22 @ 13:35]    Creatinine Trend:  SCr 1.06 [11-15 @ 06:30]  SCr 1.11 [11-14 @ 23:00]  SCr 1.15 [11-14 @ 20:22]  SCr 1.10 [11-14 @ 13:35]  SCr 1.09 [11-14 @ 10:02]    Urinalysis - [11-14-22 @ 06:04]      Color Light Red / Appearance Slightly Turbid / SG 1.007 / pH 6.5      Gluc Negative / Ketone Negative  / Bili Negative / Urobili <2 mg/dL       Blood Large / Protein 30 mg/dL / Leuk Est Small / Nitrite Negative       / WBC 9 / Hyaline 1 / Gran  / Sq Epi  / Non Sq Epi 3 / Bacteria Negative    Urine Creatinine 11      [11-12-22 @ 02:45]  Urine Sodium 36      [11-14-22 @ 14:24]  Urine Urea Nitrogen 163.8      [11-12-22 @ 02:45]  Urine Potassium 8.8      [11-11-22 @ 23:40]  Urine Chloride 56      [11-11-22 @ 23:40]  Urine Osmolality 221      [11-14-22 @ 14:24]    TSH 13.13      [11-12-22 @ 02:40]  Lipid: chol --, , HDL --, LDL --      [11-13-22 @ 23:20]  HCV 0.06, Nonreact      [11-12-22 @ 02:40]

## 2022-11-15 NOTE — PROGRESS NOTE ADULT - PROBLEM SELECTOR PLAN 1
- Seizure while at home, presented with Na of 112 on 11/11. Admitted to MICU for HTS and further management  - Had retention and reynolds placed while in MICU, received desmopressin for high UOP, likely post-obstructive diuresis  - Had received D5W on 11/12 2/2 concern for overcorrection. 118 on 11/13  - Received 2% on 11/13 at 30cc/hr for four hours, had sodium up to 120 in the AM with PM repeat of 127 for which she was given DDAVP 2mcg on 11/14  - Na this AM of 125  - F/U repeat BMP at 1200 today  - Fluid restrict to <1L, avoid hypotonic fluids, avoid greater than 6-8 increase in Na over 24 hour period  - CT A/P w/ IV contrast and CT head non-con without acute pathology, thought to be related to her diarrhea and her urinary retention  - Nephrology continuing to follow - Seizure while at home, presented with Na of 112 on 11/11. Admitted to MICU for HTS and further management  - Had retention and reynolds placed while in MICU, received desmopressin for high UOP, likely post-obstructive diuresis  - Had received D5W on 11/12 2/2 concern for overcorrection. 118 on 11/13  - Received 2% on 11/13 at 30cc/hr for four hours, had sodium up to 120 in the AM with PM repeat of 127 for which she was given DDAVP 2mcg on 11/14  - Na this AM of 125  - F/U repeat BMP, serum osm at 1200 today  - Fluid restrict to <1L, avoid hypotonic fluids, avoid greater than 6-8 increase in Na over 24 hour period  - CT A/P w/ IV contrast and CT head non-con without acute pathology, thought to be related to her diarrhea and her urinary retention  - Nephrology continuing to follow

## 2022-11-15 NOTE — PROGRESS NOTE ADULT - ATTENDING COMMENTS
MAGUE and Hyperkalemia resolving   hyponatremia with rapid correction s/p DDAVP  stable 126 today. continue with fluid restriction to 1liter/day   repeat Na every 12 hours and call nephrology for further guidance   Assessment and plan as outlined above. Monitor labs and urine output. Avoid any potential nephrotoxins. Dose medications as per eGFR.  Further detailed plan of management and recommendation as outlined above by the renal fellow.  plan discussed with pt and daughter at bedside

## 2022-11-16 DIAGNOSIS — G25.81 RESTLESS LEGS SYNDROME: ICD-10-CM

## 2022-11-16 LAB
ANION GAP SERPL CALC-SCNC: 12 MMOL/L — SIGNIFICANT CHANGE UP (ref 7–14)
ANION GAP SERPL CALC-SCNC: 14 MMOL/L — SIGNIFICANT CHANGE UP (ref 7–14)
BUN SERPL-MCNC: 25 MG/DL — HIGH (ref 7–23)
BUN SERPL-MCNC: 28 MG/DL — HIGH (ref 7–23)
CALCIUM SERPL-MCNC: 8.7 MG/DL — SIGNIFICANT CHANGE UP (ref 8.4–10.5)
CALCIUM SERPL-MCNC: 8.8 MG/DL — SIGNIFICANT CHANGE UP (ref 8.4–10.5)
CHLORIDE SERPL-SCNC: 94 MMOL/L — LOW (ref 98–107)
CHLORIDE SERPL-SCNC: 96 MMOL/L — LOW (ref 98–107)
CO2 SERPL-SCNC: 18 MMOL/L — LOW (ref 22–31)
CO2 SERPL-SCNC: 20 MMOL/L — LOW (ref 22–31)
CREAT SERPL-MCNC: 1.11 MG/DL — SIGNIFICANT CHANGE UP (ref 0.5–1.3)
CREAT SERPL-MCNC: 1.2 MG/DL — SIGNIFICANT CHANGE UP (ref 0.5–1.3)
EGFR: 50 ML/MIN/1.73M2 — LOW
EGFR: 54 ML/MIN/1.73M2 — LOW
GLUCOSE BLDC GLUCOMTR-MCNC: 172 MG/DL — HIGH (ref 70–99)
GLUCOSE BLDC GLUCOMTR-MCNC: 198 MG/DL — HIGH (ref 70–99)
GLUCOSE BLDC GLUCOMTR-MCNC: 205 MG/DL — HIGH (ref 70–99)
GLUCOSE BLDC GLUCOMTR-MCNC: 291 MG/DL — HIGH (ref 70–99)
GLUCOSE SERPL-MCNC: 165 MG/DL — HIGH (ref 70–99)
GLUCOSE SERPL-MCNC: 197 MG/DL — HIGH (ref 70–99)
HCT VFR BLD CALC: 30.4 % — LOW (ref 34.5–45)
HGB BLD-MCNC: 10.3 G/DL — LOW (ref 11.5–15.5)
MAGNESIUM SERPL-MCNC: 2 MG/DL — SIGNIFICANT CHANGE UP (ref 1.6–2.6)
MCHC RBC-ENTMCNC: 28.5 PG — SIGNIFICANT CHANGE UP (ref 27–34)
MCHC RBC-ENTMCNC: 33.9 GM/DL — SIGNIFICANT CHANGE UP (ref 32–36)
MCV RBC AUTO: 84 FL — SIGNIFICANT CHANGE UP (ref 80–100)
NRBC # BLD: 0 /100 WBCS — SIGNIFICANT CHANGE UP (ref 0–0)
NRBC # FLD: 0 K/UL — SIGNIFICANT CHANGE UP (ref 0–0)
OSMOLALITY SERPL: 294 MOSM/KG — SIGNIFICANT CHANGE UP (ref 275–295)
PHOSPHATE SERPL-MCNC: 3.6 MG/DL — SIGNIFICANT CHANGE UP (ref 2.5–4.5)
PLATELET # BLD AUTO: 272 K/UL — SIGNIFICANT CHANGE UP (ref 150–400)
POTASSIUM SERPL-MCNC: 4.8 MMOL/L — SIGNIFICANT CHANGE UP (ref 3.5–5.3)
POTASSIUM SERPL-MCNC: 5 MMOL/L — SIGNIFICANT CHANGE UP (ref 3.5–5.3)
POTASSIUM SERPL-SCNC: 4.8 MMOL/L — SIGNIFICANT CHANGE UP (ref 3.5–5.3)
POTASSIUM SERPL-SCNC: 5 MMOL/L — SIGNIFICANT CHANGE UP (ref 3.5–5.3)
RBC # BLD: 3.62 M/UL — LOW (ref 3.8–5.2)
RBC # FLD: 13.5 % — SIGNIFICANT CHANGE UP (ref 10.3–14.5)
SODIUM SERPL-SCNC: 126 MMOL/L — LOW (ref 135–145)
SODIUM SERPL-SCNC: 128 MMOL/L — LOW (ref 135–145)
WBC # BLD: 10.55 K/UL — HIGH (ref 3.8–10.5)
WBC # FLD AUTO: 10.55 K/UL — HIGH (ref 3.8–10.5)

## 2022-11-16 PROCEDURE — 99233 SBSQ HOSP IP/OBS HIGH 50: CPT | Mod: GC

## 2022-11-16 PROCEDURE — 95720 EEG PHY/QHP EA INCR W/VEEG: CPT

## 2022-11-16 PROCEDURE — 99232 SBSQ HOSP IP/OBS MODERATE 35: CPT | Mod: GC

## 2022-11-16 RX ORDER — SODIUM CHLORIDE 9 MG/ML
1 INJECTION INTRAMUSCULAR; INTRAVENOUS; SUBCUTANEOUS THREE TIMES A DAY
Refills: 0 | Status: DISCONTINUED | OUTPATIENT
Start: 2022-11-16 | End: 2022-11-17

## 2022-11-16 RX ORDER — ROPINIROLE 8 MG/1
1 TABLET, FILM COATED, EXTENDED RELEASE ORAL AT BEDTIME
Refills: 0 | Status: DISCONTINUED | OUTPATIENT
Start: 2022-11-16 | End: 2022-11-17

## 2022-11-16 RX ORDER — MUPIROCIN 20 MG/G
1 OINTMENT TOPICAL
Refills: 0 | Status: DISCONTINUED | OUTPATIENT
Start: 2022-11-16 | End: 2022-11-17

## 2022-11-16 RX ORDER — MUPIROCIN 20 MG/G
1 OINTMENT TOPICAL
Refills: 0 | Status: DISCONTINUED | OUTPATIENT
Start: 2022-11-16 | End: 2022-11-16

## 2022-11-16 RX ADMIN — Medication 25 MICROGRAM(S): at 05:29

## 2022-11-16 RX ADMIN — Medication 1: at 21:41

## 2022-11-16 RX ADMIN — Medication 1: at 09:14

## 2022-11-16 RX ADMIN — MUPIROCIN 1 APPLICATION(S): 20 OINTMENT TOPICAL at 12:47

## 2022-11-16 RX ADMIN — SIMVASTATIN 40 MILLIGRAM(S): 20 TABLET, FILM COATED ORAL at 21:40

## 2022-11-16 RX ADMIN — Medication 9 MILLIGRAM(S): at 21:40

## 2022-11-16 RX ADMIN — Medication 1: at 17:31

## 2022-11-16 RX ADMIN — Medication 1 TABLET(S): at 12:47

## 2022-11-16 RX ADMIN — SODIUM CHLORIDE 1 GRAM(S): 9 INJECTION INTRAMUSCULAR; INTRAVENOUS; SUBCUTANEOUS at 15:38

## 2022-11-16 RX ADMIN — HEPARIN SODIUM 5000 UNIT(S): 5000 INJECTION INTRAVENOUS; SUBCUTANEOUS at 05:29

## 2022-11-16 RX ADMIN — Medication 1 MILLIGRAM(S): at 12:47

## 2022-11-16 RX ADMIN — Medication 2: at 12:48

## 2022-11-16 RX ADMIN — HEPARIN SODIUM 5000 UNIT(S): 5000 INJECTION INTRAVENOUS; SUBCUTANEOUS at 21:42

## 2022-11-16 RX ADMIN — CHLORHEXIDINE GLUCONATE 1 APPLICATION(S): 213 SOLUTION TOPICAL at 05:29

## 2022-11-16 RX ADMIN — HEPARIN SODIUM 5000 UNIT(S): 5000 INJECTION INTRAVENOUS; SUBCUTANEOUS at 14:44

## 2022-11-16 RX ADMIN — ATENOLOL 50 MILLIGRAM(S): 25 TABLET ORAL at 05:29

## 2022-11-16 RX ADMIN — MUPIROCIN 1 APPLICATION(S): 20 OINTMENT TOPICAL at 17:31

## 2022-11-16 RX ADMIN — SODIUM CHLORIDE 1 GRAM(S): 9 INJECTION INTRAMUSCULAR; INTRAVENOUS; SUBCUTANEOUS at 21:40

## 2022-11-16 NOTE — PROGRESS NOTE ADULT - PROBLEM SELECTOR PLAN 2
- Patient prior to hospital admission noted to become unresponsive with associated arm and eye twitching lasting for 1-2 minutes with incontinence of bowels  - Likely precipitating factor was patient's hyponatremia, but will assess further with vEEG  - Will hold off on neuro consult for now, unless abnormality seen on vEEG - Patient prior to hospital admission noted to become unresponsive with associated arm and eye twitching lasting for 1-2 minutes with incontinence of bowels  - vEEG without signs of epileptiform activity per report  - Likely cause of seizure patient's hyponatremia

## 2022-11-16 NOTE — PHYSICAL THERAPY INITIAL EVALUATION ADULT - PERTINENT HX OF CURRENT PROBLEM, REHAB EVAL
Per documentation, pt. presented with generalized weakness, dizziness and lower extremity heaviness admitted to the MICU for hyponatremia. Transferred to floors with improving hyponatremia, s/p reynolds catheter removal with passed TOV. Pt. had episode of seizure while at home.

## 2022-11-16 NOTE — PROGRESS NOTE ADULT - ASSESSMENT
67 year old female PMHx HTN, HLD, DM2, iron deficiency anemia (s/p iron transfusion yesterday) presenting with generalized weakness, dizziness and lower extremity heaviness admitted to the MICU for hyponatremia. Transferred to floors with improving hyponatremia, s/p reynolds catheter removal with passed TOV. 67 year old female PMHx HTN, HLD, DM2, iron deficiency anemia (s/p iron transfusion yesterday) presenting with generalized weakness, dizziness and lower extremity heaviness admitted to the MICU for hyponatremia. Transferred to floors with improving hyponatremia, s/p reynolds catheter removal with passed TOV. S/P vEEG.

## 2022-11-16 NOTE — PROGRESS NOTE ADULT - PROBLEM SELECTOR PLAN 5
- BPs have been stable, holding home meds at current time - BPs have been stable, on atenolol 50mg qd

## 2022-11-16 NOTE — PROGRESS NOTE ADULT - PROBLEM SELECTOR PLAN 7
- DVT: Heparin 5000U subq q8hr  - Diet: CC diet, renal, halal, 1L fluid restriction - History of iron deficiency, could possibly explain occurrence  - F/U iron studies  - Ordered for 1mg ropinirole at bedtime

## 2022-11-16 NOTE — EEG REPORT - NS EEG TEXT BOX
ASHLEY CHRISTINE [id number]     Study Date: 11-16-22 18:47PM to 11-17-22 08:00AM  Duration: 12:58 hrs   --------------------------------------------------------------------------------------------------  History:  CC/ HPI Patient is a 67y old  Female who presents with a chief complaint of Hyponatremia (13 Nov 2022 07:20)    MEDICATIONS  (STANDING):  atenolol  Tablet 50 milliGRAM(s) Oral daily  chlorhexidine 2% Cloths 1 Application(s) Topical <User Schedule>  folic acid 1 milliGRAM(s) Oral daily  glucagon  Injectable 1 milliGRAM(s) IntraMuscular once  heparin   Injectable 5000 Unit(s) SubCutaneous every 8 hours  insulin lispro (ADMELOG) corrective regimen sliding scale   SubCutaneous three times a day before meals  insulin lispro (ADMELOG) corrective regimen sliding scale   SubCutaneous at bedtime  levothyroxine 25 MICROGram(s) Oral daily  melatonin 9 milliGRAM(s) Oral at bedtime  multivitamin 1 Tablet(s) Oral daily  simvastatin 40 milliGRAM(s) Oral at bedtime    --------------------------------------------------------------------------------------------------  Study Interpretation:    [Abbreviation Key:  PDR=alpha rhythm/posterior dominant rhythm. A-P=anterior posterior.  Amplitude: ‘very low’:<20; ‘low’:20-49; ‘medium’:; ‘high’:>150uV.  Persistence for periodic/rhythmic patterns (% of epoch) ‘rare’:<1%; ‘occasional’:1-10%; ‘frequent’:10-50%; ‘abundant’:50-90%; ‘continuous’:>90%.  Persistence for sporadic discharges: ‘rare’:<1/hr; ‘occasional’:1/min-1/hr; ‘frequent’:>1/min; ‘abundant’:>1/10 sec.  RPP=rhythmic and periodic patterns; GRDA=generalized rhythmic delta activity; FIRDA=frontal intermittent GRDA; LRDA=lateralized rhythmic delta activity; TIRDA=temporal intermittent rhythmic delta activity;  LPD=PLED=lateralized periodic discharges; GPD=generalized periodic discharges; BIPDs =bilateral independent periodic discharges; Mf=multifocal; SIRPDs=stimulus induced rhythmic, periodic, or ictal appearing discharges; BIRDs=brief potentially ictal rhythmic discharges >4 Hz, lasting .5-10s; PFA (paroxysmal bursts >13 Hz or =8 Hz <10s).  Modifiers: +F=with fast component; +S=with spike component; +R=with rhythmic component.  S-B=burst suppression pattern.  Max=maximal. N1-drowsy; N2-stage II sleep; N3-slow wave sleep. SSS/BETS=small sharp spikes/benign epileptiform transients of sleep. HV=hyperventilation; PS=photic stimulation]    Daily EEG Visual Analysis  FINDINGS:      Background:  Continuity: continuous  Symmetry: symmetric  PDR: 9 Hz activity, with amplitude to 40 uV, that attenuated to eye opening.    Reactivity: present  Voltage: normal (between 20-150uV)  Anterior Posterior Gradient: present  Other background findings: none  Breach: absent    Background Slowing:  Generalized slowing: diffuse irregular delta and theta activity.  Focal slowing: none was present.    State Changes:   -Drowsiness noted with increased slowing, attenuation of fast activity, vertex transients.  -Present with N2 sleep transients with symmetric spindles and K-complexes.    Sporadic Epileptiform Discharges:    None    Rhythmic and Periodic Patterns (RPPs):  None     Electrographic and Electroclinical seizures:  None    Other Clinical Events:  None    Activation Procedures:   -Hyperventilation was not performed.    -Photic stimulation was performed, noted photic driving      Artifacts:  Intermittent myogenic and movement artifacts were noted.    ECG:  The heart rate on single channel ECG was predominantly between 50-60 BPM.    EEG Classification / Summary:  Abnormal EEG study  Mild generalized background slowing    -----------------------------------------------------------------------------------------------------    Clinical Impression:  Mild diffuse/multifocal cerebral dysfunction, not specific as to etiology.  There were no epileptiform abnormalities recorded.      In absence of additional clinical concerns, recommend consideration for discontinuation of current EEG study with reconnection in future if warranted.    -------------------------------------------------------------------------------------------------------  Aidan Mlechor MD  Epilepsy Fellow , [facility]  Department of Neurology, [facility] [employer]    [facility] EEG Reading Room Ph#: [phone]  Epilepsy Answering Service after 5PM and before 8:30AM: Ph#: [phone]   ASHLEY CHRISTINE [id number]     Study Date: 11-16-22 18:47PM to 11-17-22 10:15AM  Duration: 15:13 hrs   --------------------------------------------------------------------------------------------------  History:  CC/ HPI Patient is a 67y old  Female who presents with a chief complaint of Hyponatremia (13 Nov 2022 07:20)    MEDICATIONS  (STANDING):  atenolol  Tablet 50 milliGRAM(s) Oral daily  chlorhexidine 2% Cloths 1 Application(s) Topical <User Schedule>  folic acid 1 milliGRAM(s) Oral daily  glucagon  Injectable 1 milliGRAM(s) IntraMuscular once  heparin   Injectable 5000 Unit(s) SubCutaneous every 8 hours  insulin lispro (ADMELOG) corrective regimen sliding scale   SubCutaneous three times a day before meals  insulin lispro (ADMELOG) corrective regimen sliding scale   SubCutaneous at bedtime  levothyroxine 25 MICROGram(s) Oral daily  melatonin 9 milliGRAM(s) Oral at bedtime  multivitamin 1 Tablet(s) Oral daily  simvastatin 40 milliGRAM(s) Oral at bedtime    --------------------------------------------------------------------------------------------------  Study Interpretation:    [Abbreviation Key:  PDR=alpha rhythm/posterior dominant rhythm. A-P=anterior posterior.  Amplitude: ‘very low’:<20; ‘low’:20-49; ‘medium’:; ‘high’:>150uV.  Persistence for periodic/rhythmic patterns (% of epoch) ‘rare’:<1%; ‘occasional’:1-10%; ‘frequent’:10-50%; ‘abundant’:50-90%; ‘continuous’:>90%.  Persistence for sporadic discharges: ‘rare’:<1/hr; ‘occasional’:1/min-1/hr; ‘frequent’:>1/min; ‘abundant’:>1/10 sec.  RPP=rhythmic and periodic patterns; GRDA=generalized rhythmic delta activity; FIRDA=frontal intermittent GRDA; LRDA=lateralized rhythmic delta activity; TIRDA=temporal intermittent rhythmic delta activity;  LPD=PLED=lateralized periodic discharges; GPD=generalized periodic discharges; BIPDs =bilateral independent periodic discharges; Mf=multifocal; SIRPDs=stimulus induced rhythmic, periodic, or ictal appearing discharges; BIRDs=brief potentially ictal rhythmic discharges >4 Hz, lasting .5-10s; PFA (paroxysmal bursts >13 Hz or =8 Hz <10s).  Modifiers: +F=with fast component; +S=with spike component; +R=with rhythmic component.  S-B=burst suppression pattern.  Max=maximal. N1-drowsy; N2-stage II sleep; N3-slow wave sleep. SSS/BETS=small sharp spikes/benign epileptiform transients of sleep. HV=hyperventilation; PS=photic stimulation]    Daily EEG Visual Analysis  FINDINGS:      Background:  Continuity: continuous  Symmetry: symmetric  PDR: 9 Hz activity, with amplitude to 40 uV, that attenuated to eye opening.    Reactivity: present  Voltage: normal (between 20-150uV)  Anterior Posterior Gradient: present  Other background findings: none  Breach: absent    Background Slowing:  Generalized slowing: diffuse irregular delta and theta activity.  Focal slowing: none was present.    State Changes:   -Drowsiness noted with increased slowing, attenuation of fast activity, vertex transients.  -Present with N2 sleep transients with symmetric spindles and K-complexes.    Sporadic Epileptiform Discharges:    None    Rhythmic and Periodic Patterns (RPPs):  None     Electrographic and Electroclinical seizures:  None    Other Clinical Events:  None    Activation Procedures:   -Hyperventilation was not performed.    -Photic stimulation was performed, noted photic driving      Artifacts:  Intermittent myogenic and movement artifacts were noted.    ECG:  The heart rate on single channel ECG was predominantly between 50-60 BPM.    EEG Classification / Summary:  Abnormal EEG study  Mild generalized background slowing    -----------------------------------------------------------------------------------------------------    Clinical Impression:  Mild diffuse/multifocal cerebral dysfunction, not specific as to etiology.  There were no epileptiform abnormalities recorded.      In absence of additional clinical concerns, recommend consideration for discontinuation of current EEG study with reconnection in future if warranted.    -------------------------------------------------------------------------------------------------------  Aidan Melchor MD  Epilepsy Fellow , [facility]  Department of Neurology, [facility] [employer]    [facility] EEG Reading Room Ph#: [phone]  Epilepsy Answering Service after 5PM and before 8:30AM: Ph#: [phone]   ASHLEY CHRISTINE [id number]     Study Date: 11-16-22 18:47PM to 11-17-22 10:15AM  Duration: 15:13 hrs   --------------------------------------------------------------------------------------------------  History:  CC/ HPI Patient is a 67y old  Female who presents with a chief complaint of Hyponatremia (13 Nov 2022 07:20)    MEDICATIONS  (STANDING):  atenolol  Tablet 50 milliGRAM(s) Oral daily  chlorhexidine 2% Cloths 1 Application(s) Topical <User Schedule>  folic acid 1 milliGRAM(s) Oral daily  glucagon  Injectable 1 milliGRAM(s) IntraMuscular once  heparin   Injectable 5000 Unit(s) SubCutaneous every 8 hours  insulin lispro (ADMELOG) corrective regimen sliding scale   SubCutaneous three times a day before meals  insulin lispro (ADMELOG) corrective regimen sliding scale   SubCutaneous at bedtime  levothyroxine 25 MICROGram(s) Oral daily  melatonin 9 milliGRAM(s) Oral at bedtime  multivitamin 1 Tablet(s) Oral daily  simvastatin 40 milliGRAM(s) Oral at bedtime    --------------------------------------------------------------------------------------------------  Study Interpretation:    [Abbreviation Key:  PDR=alpha rhythm/posterior dominant rhythm. A-P=anterior posterior.  Amplitude: ‘very low’:<20; ‘low’:20-49; ‘medium’:; ‘high’:>150uV.  Persistence for periodic/rhythmic patterns (% of epoch) ‘rare’:<1%; ‘occasional’:1-10%; ‘frequent’:10-50%; ‘abundant’:50-90%; ‘continuous’:>90%.  Persistence for sporadic discharges: ‘rare’:<1/hr; ‘occasional’:1/min-1/hr; ‘frequent’:>1/min; ‘abundant’:>1/10 sec.  RPP=rhythmic and periodic patterns; GRDA=generalized rhythmic delta activity; FIRDA=frontal intermittent GRDA; LRDA=lateralized rhythmic delta activity; TIRDA=temporal intermittent rhythmic delta activity;  LPD=PLED=lateralized periodic discharges; GPD=generalized periodic discharges; BIPDs =bilateral independent periodic discharges; Mf=multifocal; SIRPDs=stimulus induced rhythmic, periodic, or ictal appearing discharges; BIRDs=brief potentially ictal rhythmic discharges >4 Hz, lasting .5-10s; PFA (paroxysmal bursts >13 Hz or =8 Hz <10s).  Modifiers: +F=with fast component; +S=with spike component; +R=with rhythmic component.  S-B=burst suppression pattern.  Max=maximal. N1-drowsy; N2-stage II sleep; N3-slow wave sleep. SSS/BETS=small sharp spikes/benign epileptiform transients of sleep. HV=hyperventilation; PS=photic stimulation]    Daily EEG Visual Analysis  FINDINGS:      Background:  Continuity: continuous  Symmetry: symmetric  PDR: 9 Hz activity, with amplitude to 40 uV, that attenuated to eye opening.    Reactivity: present  Voltage: normal (between 20-150uV)  Anterior Posterior Gradient: present  Other background findings: none  Breach: absent    Background Slowing:  Generalized slowing: none was present.  Focal slowing: none was present.    State Changes:   -Drowsiness noted with increased slowing, attenuation of fast activity, vertex transients.  -Present with N2 sleep transients with symmetric spindles and K-complexes.    Sporadic Epileptiform Discharges:    None    Rhythmic and Periodic Patterns (RPPs):  None     Electrographic and Electroclinical seizures:  None    Other Clinical Events:  None    Activation Procedures:   -Hyperventilation was not performed.    -Photic stimulation was performed, noted photic driving      Artifacts:  Intermittent myogenic and movement artifacts were noted.    ECG:  The heart rate on single channel ECG was predominantly between 50-60 BPM.    EEG Classification / Summary:  Normal EEG study awake/drowsy/asleep    -----------------------------------------------------------------------------------------------------    Clinical Impression:  Normal EEG study awake/drowsy/asleep  There were no epileptiform abnormalities recorded.      In absence of additional clinical concerns, recommend consideration for discontinuation of current EEG study with reconnection in future if warranted.    -------------------------------------------------------------------------------------------------------  Aidan Melchor MD  Epilepsy Fellow , [facility]  Department of Neurology, [facility]    [facility] system EEG Reading Room Ph#: [phone]  Epilepsy Answering Service after 5PM and before 8:30AM: Ph#: [phone]   ASHLEY CHRISTINE [id number]     Study Date: 11-16-22 18:47PM to 11-17-22 10:15AM  Duration: 15:13 hrs   --------------------------------------------------------------------------------------------------  History:  CC/ HPI Patient is a 67y old  Female who presents with a chief complaint of Hyponatremia (13 Nov 2022 07:20)    MEDICATIONS  (STANDING):  atenolol  Tablet 50 milliGRAM(s) Oral daily  chlorhexidine 2% Cloths 1 Application(s) Topical <User Schedule>  folic acid 1 milliGRAM(s) Oral daily  glucagon  Injectable 1 milliGRAM(s) IntraMuscular once  heparin   Injectable 5000 Unit(s) SubCutaneous every 8 hours  insulin lispro (ADMELOG) corrective regimen sliding scale   SubCutaneous three times a day before meals  insulin lispro (ADMELOG) corrective regimen sliding scale   SubCutaneous at bedtime  levothyroxine 25 MICROGram(s) Oral daily  melatonin 9 milliGRAM(s) Oral at bedtime  multivitamin 1 Tablet(s) Oral daily  simvastatin 40 milliGRAM(s) Oral at bedtime    --------------------------------------------------------------------------------------------------  Study Interpretation:    [Abbreviation Key:  PDR=alpha rhythm/posterior dominant rhythm. A-P=anterior posterior.  Amplitude: ‘very low’:<20; ‘low’:20-49; ‘medium’:; ‘high’:>150uV.  Persistence for periodic/rhythmic patterns (% of epoch) ‘rare’:<1%; ‘occasional’:1-10%; ‘frequent’:10-50%; ‘abundant’:50-90%; ‘continuous’:>90%.  Persistence for sporadic discharges: ‘rare’:<1/hr; ‘occasional’:1/min-1/hr; ‘frequent’:>1/min; ‘abundant’:>1/10 sec.  RPP=rhythmic and periodic patterns; GRDA=generalized rhythmic delta activity; FIRDA=frontal intermittent GRDA; LRDA=lateralized rhythmic delta activity; TIRDA=temporal intermittent rhythmic delta activity;  LPD=PLED=lateralized periodic discharges; GPD=generalized periodic discharges; BIPDs =bilateral independent periodic discharges; Mf=multifocal; SIRPDs=stimulus induced rhythmic, periodic, or ictal appearing discharges; BIRDs=brief potentially ictal rhythmic discharges >4 Hz, lasting .5-10s; PFA (paroxysmal bursts >13 Hz or =8 Hz <10s).  Modifiers: +F=with fast component; +S=with spike component; +R=with rhythmic component.  S-B=burst suppression pattern.  Max=maximal. N1-drowsy; N2-stage II sleep; N3-slow wave sleep. SSS/BETS=small sharp spikes/benign epileptiform transients of sleep. HV=hyperventilation; PS=photic stimulation]    Daily EEG Visual Analysis  FINDINGS:      Background:  Continuity: continuous  Symmetry: symmetric  PDR: 9 Hz activity, with amplitude to 40 uV, that attenuated to eye opening.    Reactivity: present  Voltage: normal (between 20-150uV)  Anterior Posterior Gradient: present  Other background findings: none  Breach: absent    Background Slowing:  Generalized slowing: none was present.  Focal slowing: none was present.    State Changes:   -Drowsiness noted with increased slowing, attenuation of fast activity, vertex transients.  -Present with N2 sleep transients with symmetric spindles and K-complexes.    Sporadic Epileptiform Discharges:    None    Rhythmic and Periodic Patterns (RPPs):  None     Electrographic and Electroclinical seizures:  None    Other Clinical Events:  None    Activation Procedures:   -Hyperventilation was not performed.    -Photic stimulation was performed, noted photic driving      Artifacts:  Intermittent myogenic and movement artifacts were noted.    ECG:  The heart rate on single channel ECG was predominantly between 50-60 BPM.    EEG Classification / Summary:  Normal EEG study awake/drowsy/asleep    -----------------------------------------------------------------------------------------------------    Clinical Impression:  Normal EEG study awake/drowsy/asleep  There were no epileptiform abnormalities recorded.      In absence of additional clinical concerns, recommend consideration for discontinuation of current EEG study with reconnection in future if warranted.    -------------------------------------------------------------------------------------------------------  Aidan Melchor MD  Epilepsy Fellow , [facility]  Department of Neurology, [facility]    Valdemar Wilson MD  EEG/Epilepsy Attending     [facility] EEG Reading Room Ph#: [phone]  Epilepsy Answering Service after 5PM and before 8:30AM: Ph#: [phone]

## 2022-11-16 NOTE — PROGRESS NOTE ADULT - ATTENDING COMMENTS
67 year old female with HTN, DM2, ZIGGY (s/p iron transfusion), p/w generalized weakness, dizziness, and suspected seizure at home found to have Na 112, admitted to MICU, now downgraded to medical floors. HypoNa suspected in setting of diarrhea and urinary retention, appreciate Renal input, now up to 126. Starting salt tabs today per renal, c/w BMP monitoring No further seizure like activity here, no prior hx of seizures, CTH unremarkable, EEG unremarkabl, suspected seizure in setting of hypoNa which is improving. Daughter updated at bedside.

## 2022-11-16 NOTE — PHYSICAL THERAPY INITIAL EVALUATION ADULT - ADDITIONAL COMMENTS
Pt. was left in bed post PT Evaluation, no apparent distress, call bell within reach. RN made aware.

## 2022-11-16 NOTE — PROGRESS NOTE ADULT - ATTENDING COMMENTS
MAGUE and Hyperkalemia resolving   hyponatremia more stable over the past 24-48 hours.   can start Salt tabs as outlined above   continue with fluid restriction to 1 liter  Assessment and plan as outlined above. Monitor labs and urine output. Avoid any potential nephrotoxins. Dose medications as per eGFR.  Further detailed plan of management and recommendation as outlined above by the renal fellow.  plan discussed with pt and daughter at bedside

## 2022-11-16 NOTE — PROGRESS NOTE ADULT - SUBJECTIVE AND OBJECTIVE BOX
[facility] DIVISION OF KIDNEY DISEASES AND HYPERTENSION -- FOLLOW UP NOTE  --------------------------------------------------------------------------------  HPI: 67-year-old female with history of CKD, HTN, DM, HLD, and iron deficiency anemia presented with weakness and dizziness. Pt. being seen for hyponatremia, hyperkalemia and elevated Scr level.     Upon review of labs on [facility], SNa was initially 112 on 11/11. Pt received 150 cc bolus of 3% hypertonic saline on 11/11. SNa improved to 116 on early AM labs done on 11/12/22. Pt. received desmopressin by primary medical team for high urine output. Pt received hypotonic fluids (D5W) on 11/12/22 for overcorrection of SNa. SNa was low at 118 on 11/13/22. Received  2% HTS at 30 cc/hr x 4 hours. Sna was low/stable at 120 in AM on 11/14/22. Subsequently SNa improved to 127 in PM on 11/14/22. Received DDAVP 2 mcg IV once. SNa is stable at 126 today (11/16/22).     As per review of previous labs (provided by daughter), Scr was 1.2 on 5/5/22 and was 1.21 on 8/18/22. SNa was WNL at 140 on 8/18/22. Pt has had history of hyponatremia in 2015 during hospitalization at [facility]. As per daughters, pt. was made aware of her kidney disease by her PCP since last ~2 months. Baseline Scr level however unknown and pt has not seen a nephrologist as outpatient in the past.      Pt seen and examined today, daughter at bedside. Pt  reported of feeling better. No fever, chest pain, SOB, abdominal pain or HA at time of evaluation.     PAST HISTORY  --------------------------------------------------------------------------------  No significant changes to PMH, PSH, FHx, SHx, unless otherwise noted    ALLERGIES & MEDICATIONS  --------------------------------------------------------------------------------  Allergies    No Known Allergies    Intolerances    Standing Inpatient Medications  atenolol  Tablet 50 milliGRAM(s) Oral daily  chlorhexidine 2% Cloths 1 Application(s) Topical <User Schedule>  dextrose 5%. 1000 milliLiter(s) IV Continuous <Continuous>  dextrose 5%. 1000 milliLiter(s) IV Continuous <Continuous>  dextrose 50% Injectable 25 Gram(s) IV Push once  dextrose 50% Injectable 12.5 Gram(s) IV Push once  dextrose 50% Injectable 25 Gram(s) IV Push once  folic acid 1 milliGRAM(s) Oral daily  glucagon  Injectable 1 milliGRAM(s) IntraMuscular once  heparin   Injectable 5000 Unit(s) SubCutaneous every 8 hours  insulin lispro (ADMELOG) corrective regimen sliding scale   SubCutaneous three times a day before meals  insulin lispro (ADMELOG) corrective regimen sliding scale   SubCutaneous at bedtime  levothyroxine 25 MICROGram(s) Oral daily  melatonin 9 milliGRAM(s) Oral at bedtime  multivitamin 1 Tablet(s) Oral daily  mupirocin 2% Ointment 1 Application(s) Both Nostrils two times a day  simvastatin 40 milliGRAM(s) Oral at bedtime  sodium chloride 1 Gram(s) Oral three times a day    PRN Inpatient Medications  dextrose Oral Gel 15 Gram(s) Oral once PRN    REVIEW OF SYSTEMS  --------------------------------------------------------------------------------  Gen: No fevers   Respiratory: No dyspnea  CV: No chest pain  GI: No abdominal pain  : No dysuria  MSK: No  edema  Neuro: no dizziness  All other systems were reviewed and are negative, except as noted.    VITALS/PHYSICAL EXAM  --------------------------------------------------------------------------------  T(C): 36.8 (11-16-22 @ 05:20), Max: 36.8 (11-16-22 @ 05:20)  HR: 66 (11-16-22 @ 05:20) (60 - 66)  BP: 134/64 (11-16-22 @ 05:20) (127/58 - 134/64)  RR: 18 (11-16-22 @ 05:20) (18 - 18)  SpO2: 100% (11-16-22 @ 05:20) (100% - 100%)  Wt(kg): --    Physical Exam:  Gen: resting  HEENT: MMM  Pulm: CTA B/L  CV: S1S2+  Abd: Soft, +BS   Ext: No LE edema B/L  Neuro: Awake, alert  : Lao catheter with dark urine+  Skin: Warm and dry	    LABS/STUDIES  --------------------------------------------------------------------------------              10.3   10.55 >-----------<  272      [11-16-22 @ 07:01]              30.4     126  |  94  |  25  ----------------------------<  165      [11-16-22 @ 07:01]  4.8   |  20  |  1.11        Ca     8.8     [11-16-22 @ 07:01]      Mg     2.00     [11-16-22 @ 07:01]      Phos  3.6     [11-16-22 @ 07:01]    Serum Osmolality 281      [11-14-22 @ 13:35]    Creatinine Trend:  SCr 1.11 [11-16 @ 07:01]  SCr 1.16 [11-15 @ 21:28]  SCr 1.01 [11-15 @ 12:15]  SCr 1.06 [11-15 @ 06:30]  SCr 1.11 [11-14 @ 23:00]    Urinalysis - [11-14-22 @ 06:04]      Color Light Red / Appearance Slightly Turbid / SG 1.007 / pH 6.5      Gluc Negative / Ketone Negative  / Bili Negative / Urobili <2 mg/dL       Blood Large / Protein 30 mg/dL / Leuk Est Small / Nitrite Negative       / WBC 9 / Hyaline 1 / Gran  / Sq Epi  / Non Sq Epi 3 / Bacteria Negative    Urine Creatinine 11      [11-12-22 @ 02:45]  Urine Sodium 113      [11-15-22 @ 10:33]  Urine Urea Nitrogen 163.8      [11-12-22 @ 02:45]  Urine Potassium 8.8      [11-11-22 @ 23:40]  Urine Chloride 56      [11-11-22 @ 23:40]  Urine Osmolality 651      [11-15-22 @ 10:33]    TSH 13.13      [11-12-22 @ 02:40]  Lipid: chol --, , HDL --, LDL --      [11-13-22 @ 23:20]    HCV 0.06, Nonreact      [11-12-22 @ 02:40]

## 2022-11-16 NOTE — PROGRESS NOTE ADULT - PROBLEM SELECTOR PLAN 4
- US kidney and bladder demonstrating no hydronephrosis but increased echogenicity suggestive of chronic disease  - Baseline per outpatient appears to be around 1.2 in May and August  - Had previously been on Finerenone and Losartan, holding for now  - Nephrology following

## 2022-11-16 NOTE — PROGRESS NOTE ADULT - PROBLEM SELECTOR PLAN 2
Pt admitted with hyperkalemia in the setting of CKD, Losartan, Finerinone, hyperglycemia and likely urine retention. Serum potassium was elevated at 7.4 on 11/11. Pt received medical management. Continue to hold Losartan and Finerinone. Serum potassium WNL (4.8) today. Low K diet. Monitor serum potassium

## 2022-11-16 NOTE — PROGRESS NOTE ADULT - PROBLEM SELECTOR PLAN 3
- Baseline per outpatient appears to be around 1.2 in May and August  - Presented with 1.53 Cr on 11/11  - Likely iso her volume depleted status given endorsed history of diarrhea and iso urinary retention possibly due to obstruction. Had post-obstructive diuresis following reynolds placement  - Patient now s/p reynolds removal, voiding freely  - Cr this AM of 1.06, improving from admission - Baseline per outpatient appears to be around 1.2 in May and August  - Presented with 1.53 Cr on 11/11  - Likely iso her volume depleted status given endorsed history of diarrhea and iso urinary retention possibly due to obstruction. Had post-obstructive diuresis following reynolds placement  - Patient now s/p reynolds removal, voiding freely  - Cr this AM of 1.11, improving from admission

## 2022-11-16 NOTE — PROGRESS NOTE ADULT - PROBLEM SELECTOR PLAN 1
- Seizure while at home, presented with Na of 112 on 11/11. Admitted to MICU for HTS and further management  - Had retention and reynolds placed while in MICU, received desmopressin for high UOP, likely post-obstructive diuresis  - Had received D5W on 11/12 2/2 concern for overcorrection. 118 on 11/13  - Received 2% on 11/13 at 30cc/hr for four hours, had sodium up to 120 in the AM with PM repeat of 127 for which she was given DDAVP 2mcg on 11/14  - Na this AM of 125  - F/U repeat BMP, serum osm at 1200 today  - Fluid restrict to <1L, avoid hypotonic fluids, avoid greater than 6-8 increase in Na over 24 hour period  - CT A/P w/ IV contrast and CT head non-con without acute pathology, thought to be related to her diarrhea and her urinary retention  - Nephrology continuing to follow - Na this AM of 126, last serum osm of 281 on 11/14, urine osm of 651 on 11/15 with urine sodium of 113 on 11/15  - Starting 1g NaCl tabs TID, reassessing BMPs q12hr  - Fluid restrict to <1L, avoid hypotonic fluids, avoid greater than 6-8 increase in Na over 24 hour period  - CT A/P w/ IV contrast and CT head non-con without acute pathology, thought to be related to her diarrhea and her urinary retention  - Nephrology continuing to follow

## 2022-11-16 NOTE — PROGRESS NOTE ADULT - SUBJECTIVE AND OBJECTIVE BOX
Medicine Progress Note    William Osorio MD  PGY1 Internal Medicine  Available on TEAMS  [facility]: [phone], [facility] [phone]    Patient is a 67y old  Female who presents with a chief complaint of Hyponatremia (13 Nov 2022 07:20)      SUBJECTIVE / OVERNIGHT EVENTS: This AM, patient seen and examined at bedside.      MEDICATIONS  (STANDING):  atenolol  Tablet 50 milliGRAM(s) Oral daily  chlorhexidine 2% Cloths 1 Application(s) Topical <User Schedule>  dextrose 5%. 1000 milliLiter(s) (100 mL/Hr) IV Continuous <Continuous>  dextrose 5%. 1000 milliLiter(s) (50 mL/Hr) IV Continuous <Continuous>  dextrose 50% Injectable 25 Gram(s) IV Push once  dextrose 50% Injectable 12.5 Gram(s) IV Push once  dextrose 50% Injectable 25 Gram(s) IV Push once  folic acid 1 milliGRAM(s) Oral daily  glucagon  Injectable 1 milliGRAM(s) IntraMuscular once  heparin   Injectable 5000 Unit(s) SubCutaneous every 8 hours  insulin lispro (ADMELOG) corrective regimen sliding scale   SubCutaneous three times a day before meals  insulin lispro (ADMELOG) corrective regimen sliding scale   SubCutaneous at bedtime  levothyroxine 25 MICROGram(s) Oral daily  melatonin 9 milliGRAM(s) Oral at bedtime  multivitamin 1 Tablet(s) Oral daily  simvastatin 40 milliGRAM(s) Oral at bedtime    MEDICATIONS  (PRN):  dextrose Oral Gel 15 Gram(s) Oral once PRN Blood Glucose LESS THAN 70 milliGRAM(s)/deciliter    CAPILLARY BLOOD GLUCOSE      POCT Blood Glucose.: 161 mg/dL (15 Nov 2022 21:26)  POCT Blood Glucose.: 241 mg/dL (15 Nov 2022 17:50)  POCT Blood Glucose.: 215 mg/dL (15 Nov 2022 12:44)  POCT Blood Glucose.: 166 mg/dL (15 Nov 2022 09:04)    I&O's Summary      PHYSICAL EXAM:  Vital Signs Last 24 Hrs  T(C): 36.8 (16 Nov 2022 05:20), Max: 36.8 (16 Nov 2022 05:20)  T(F): 98.2 (16 Nov 2022 05:20), Max: 98.2 (16 Nov 2022 05:20)  HR: 66 (16 Nov 2022 05:20) (60 - 66)  BP: 134/64 (16 Nov 2022 05:20) (127/58 - 134/64)  BP(mean): --  RR: 18 (16 Nov 2022 05:20) (18 - 18)  SpO2: 100% (16 Nov 2022 05:20) (100% - 100%)    Parameters below as of 16 Nov 2022 05:20  Patient On (Oxygen Delivery Method): room air      CONSTITUTIONAL: NAD, well-developed, well-groomed  HEENT: Moist oral mucosa, no pharyngeal injection or exudates  RESPIRATORY: Normal respiratory effort; lungs are clear to auscultation bilaterally  CARDIOVASCULAR: Regular rate and rhythm, normal S1 and S2, no murmur/rub/gallop, no lower extremity edema, peripheral pulses are 2+ bilaterally  ABDOMEN: Soft, nondistended, nontender to palpation, normoactive bowel sounds, no rebound/guarding  PSYCH: A+O to person, place, and time  NEUROLOGY: Facial expression appears symmetric, no gross sensory deficits appreciated, moving all extremities spontaneously  SKIN: No rashes; no palpable lesions    LABS:                        10.6   10.66 )-----------( 248      ( 15 Nov 2022 06:30 )             31.1     11-15    126<L>  |  94<L>  |  22  ----------------------------<  176<H>  4.9   |  21<L>  |  1.16    Ca    8.8      15 Nov 2022 21:28  Phos  3.8     11-15  Mg     2.20     11-15                  SARS-CoV-2: [facility] (11 Nov 2022 23:38)      RADIOLOGY & ADDITIONAL TESTS:  Imaging from Last 24 Hours: Medicine Progress Note    William Osorio MD  PGY1 Internal Medicine  Available on TEAMS  [facility]: [phone], [facility]: [phone]    Patient is a 67y old  Female who presents with a chief complaint of Hyponatremia (13 Nov 2022 07:20)      SUBJECTIVE / OVERNIGHT EVENTS: ON Na of 126, no interventions needed. This AM, patient seen and examined at bedside. Daughter at bedside acting as  ( services refused by patient). Patient is doing OK, with no chest pain, shortness of breath, abdominal pain, nausea or vomiting. Tolerating PO diet, still urinating without issue.      MEDICATIONS  (STANDING):  atenolol  Tablet 50 milliGRAM(s) Oral daily  chlorhexidine 2% Cloths 1 Application(s) Topical <User Schedule>  dextrose 5%. 1000 milliLiter(s) (100 mL/Hr) IV Continuous <Continuous>  dextrose 5%. 1000 milliLiter(s) (50 mL/Hr) IV Continuous <Continuous>  dextrose 50% Injectable 25 Gram(s) IV Push once  dextrose 50% Injectable 12.5 Gram(s) IV Push once  dextrose 50% Injectable 25 Gram(s) IV Push once  folic acid 1 milliGRAM(s) Oral daily  glucagon  Injectable 1 milliGRAM(s) IntraMuscular once  heparin   Injectable 5000 Unit(s) SubCutaneous every 8 hours  insulin lispro (ADMELOG) corrective regimen sliding scale   SubCutaneous three times a day before meals  insulin lispro (ADMELOG) corrective regimen sliding scale   SubCutaneous at bedtime  levothyroxine 25 MICROGram(s) Oral daily  melatonin 9 milliGRAM(s) Oral at bedtime  multivitamin 1 Tablet(s) Oral daily  simvastatin 40 milliGRAM(s) Oral at bedtime    MEDICATIONS  (PRN):  dextrose Oral Gel 15 Gram(s) Oral once PRN Blood Glucose LESS THAN 70 milliGRAM(s)/deciliter    CAPILLARY BLOOD GLUCOSE      POCT Blood Glucose.: 161 mg/dL (15 Nov 2022 21:26)  POCT Blood Glucose.: 241 mg/dL (15 Nov 2022 17:50)  POCT Blood Glucose.: 215 mg/dL (15 Nov 2022 12:44)  POCT Blood Glucose.: 166 mg/dL (15 Nov 2022 09:04)    I&O's Summary      PHYSICAL EXAM:  Vital Signs Last 24 Hrs  T(C): 36.8 (16 Nov 2022 05:20), Max: 36.8 (16 Nov 2022 05:20)  T(F): 98.2 (16 Nov 2022 05:20), Max: 98.2 (16 Nov 2022 05:20)  HR: 66 (16 Nov 2022 05:20) (60 - 66)  BP: 134/64 (16 Nov 2022 05:20) (127/58 - 134/64)  RR: 18 (16 Nov 2022 05:20) (18 - 18)  SpO2: 100% (16 Nov 2022 05:20) (100% - 100%)    Parameters below as of 16 Nov 2022 05:20  Patient On (Oxygen Delivery Method): room air      CONSTITUTIONAL: NAD, well-developed, well-groomed  HEENT: Moist oral mucosa, no pharyngeal injection or exudates, vEEG leads on head  RESPIRATORY: Normal respiratory effort; lungs are clear to auscultation bilaterally  CARDIOVASCULAR: Regular rate and rhythm, normal S1 and S2, no murmur/rub/gallop, no lower extremity edema, peripheral pulses are 2+ bilaterally  ABDOMEN: Soft, nondistended, nontender to palpation, normoactive bowel sounds, no rebound/guarding  PSYCH: A+O to person, place, and time  NEUROLOGY: Facial expression appears symmetric, no gross sensory deficits appreciated, moving all extremities spontaneously  SKIN: No rashes; no palpable lesions    LABS:                        10.6   10.66 )-----------( 248      ( 15 Nov 2022 06:30 )             31.1     11-15    126<L>  |  94<L>  |  22  ----------------------------<  176<H>  4.9   |  21<L>  |  1.16    Ca    8.8      15 Nov 2022 21:28  Phos  3.8     11-15  Mg     2.20     11-15                  SARS-CoV-2: [facility] (11 Nov 2022 23:38)      RADIOLOGY & ADDITIONAL TESTS:  Imaging from Last 24 Hours: None Medicine Progress Note    William Osorio MD  PGY1 Internal Medicine  Available on TEAMS  [facility]: [phone], [facility]: [id number]    Patient is a 67y old  Female who presents with a chief complaint of Hyponatremia (13 Nov 2022 07:20)      SUBJECTIVE / OVERNIGHT EVENTS: ON Na of 126, no interventions needed. This AM, patient seen and examined at bedside. Daughter at bedside acting as  ( services refused by patient). Patient is doing OK, with no chest pain, shortness of breath, abdominal pain, nausea or vomiting. Tolerating PO diet, still urinating without issue.      MEDICATIONS  (STANDING):  atenolol  Tablet 50 milliGRAM(s) Oral daily  chlorhexidine 2% Cloths 1 Application(s) Topical <User Schedule>  dextrose 5%. 1000 milliLiter(s) (100 mL/Hr) IV Continuous <Continuous>  dextrose 5%. 1000 milliLiter(s) (50 mL/Hr) IV Continuous <Continuous>  dextrose 50% Injectable 25 Gram(s) IV Push once  dextrose 50% Injectable 12.5 Gram(s) IV Push once  dextrose 50% Injectable 25 Gram(s) IV Push once  folic acid 1 milliGRAM(s) Oral daily  glucagon  Injectable 1 milliGRAM(s) IntraMuscular once  heparin   Injectable 5000 Unit(s) SubCutaneous every 8 hours  insulin lispro (ADMELOG) corrective regimen sliding scale   SubCutaneous three times a day before meals  insulin lispro (ADMELOG) corrective regimen sliding scale   SubCutaneous at bedtime  levothyroxine 25 MICROGram(s) Oral daily  melatonin 9 milliGRAM(s) Oral at bedtime  multivitamin 1 Tablet(s) Oral daily  simvastatin 40 milliGRAM(s) Oral at bedtime    MEDICATIONS  (PRN):  dextrose Oral Gel 15 Gram(s) Oral once PRN Blood Glucose LESS THAN 70 milliGRAM(s)/deciliter    CAPILLARY BLOOD GLUCOSE      POCT Blood Glucose.: 161 mg/dL (15 Nov 2022 21:26)  POCT Blood Glucose.: 241 mg/dL (15 Nov 2022 17:50)  POCT Blood Glucose.: 215 mg/dL (15 Nov 2022 12:44)  POCT Blood Glucose.: 166 mg/dL (15 Nov 2022 09:04)    I&O's Summary      PHYSICAL EXAM:  Vital Signs Last 24 Hrs  T(C): 36.8 (16 Nov 2022 05:20), Max: 36.8 (16 Nov 2022 05:20)  T(F): 98.2 (16 Nov 2022 05:20), Max: 98.2 (16 Nov 2022 05:20)  HR: 66 (16 Nov 2022 05:20) (60 - 66)  BP: 134/64 (16 Nov 2022 05:20) (127/58 - 134/64)  RR: 18 (16 Nov 2022 05:20) (18 - 18)  SpO2: 100% (16 Nov 2022 05:20) (100% - 100%)    Parameters below as of 16 Nov 2022 05:20  Patient On (Oxygen Delivery Method): room air      CONSTITUTIONAL: NAD, well-developed, well-groomed  HEENT: Moist oral mucosa, no pharyngeal injection or exudates, vEEG leads on head  RESPIRATORY: Normal respiratory effort; lungs are clear to auscultation bilaterally  CARDIOVASCULAR: Regular rate and rhythm, normal S1 and S2, no murmur/rub/gallop, no lower extremity edema, peripheral pulses are 2+ bilaterally  ABDOMEN: Soft, nondistended, nontender to palpation, normoactive bowel sounds, no rebound/guarding  PSYCH: A+O to person, place, and time  NEUROLOGY: Facial expression appears symmetric, no gross sensory deficits appreciated, moving all extremities spontaneously  SKIN: No rashes; no palpable lesions    LABS:                        10.6   10.66 )-----------( 248      ( 15 Nov 2022 06:30 )             31.1     11-15    126<L>  |  94<L>  |  22  ----------------------------<  176<H>  4.9   |  21<L>  |  1.16    Ca    8.8      15 Nov 2022 21:28  Phos  3.8     11-15  Mg     2.20     11-15    SARS-CoV-2: [facility] (11 Nov 2022 23:38)    RADIOLOGY & ADDITIONAL TESTS:  Imaging from Last 24 Hours: None

## 2022-11-16 NOTE — PROGRESS NOTE ADULT - PROBLEM SELECTOR PLAN 3
Pt. with history of CKD. As per review of previous outpatient labs, Scr was elevated at 1.2 on 5/5/22 and was 1.21 on 8/18/22. [facility], Scr was 1.53 on 11/11. Scr improved to 0.98 on 11/13/22. Scr is elevated/stable at  1.11 today. Pt currently non oliguric, UOP ~ 1L in last 24 hrs. Kidney sonogram showing increased echogenicity suggestive of chronic medical disease but no hydronephrosis . Pt. with MAGUE on CKD in setting of recent diarrhea and urine retention. Monitor labs and urine output. Avoid nephrotoxins. Dose medications as per eGFR.    If you have any questions, please feel free to contact me  Rciardo Ahmadi  Nephrology Fellow  [phone]/ [employer] Teams(Preferred)  (After 5pm or on weekends please page the on-call fellow).

## 2022-11-16 NOTE — PROGRESS NOTE ADULT - PROBLEM SELECTOR PLAN 1
Pt admitted with symptomatic hypo-osmolar euvolemic hyponatremia in the setting of diarrhea and urine retention. Pt. received IV HTS 3% on 11/11. SNa improved to 116 on AM labs done on 11/12/22. Pt. received desmopressin by primary medical/ICU team for high urine output (after Lao catheter placement). Pt. with history of hyponatremia in the past (2015). SNa however WNL on outpatient labs done in May and August 2022. SOsm was low at 267, UOsm was 209, and Tyler inappropriately elevated at 54. Pt with increased UOP after Lao catheter placement, suggesting of urine retention. Pt received D5W on 11/12/22 with concerns for rapid correction. SNa was low at 118 on 11/13/22. Received  2% HTS at 30 cc/hr x 4 hours. Sna was low/stable at 120 in AM on 11/14/22. Repeat SNa improved to 127 in PM on 11/14/22. Pt received DDAVP 2 mcg IV on 11/14/22. Sna is stable at 126 today (11/16/22). Recommend starting salt tabs 1 gm PO TID. Monitor SNa q12 hours. Avoid hypotonic fluids. Fluid restriction < 1L. Avoid overcorrection (not more than 6-8 mEq/L in 24 hours).

## 2022-11-17 ENCOUNTER — TRANSCRIPTION ENCOUNTER (OUTPATIENT)
Age: 67
End: 2022-11-17

## 2022-11-17 VITALS
OXYGEN SATURATION: 100 % | SYSTOLIC BLOOD PRESSURE: 132 MMHG | RESPIRATION RATE: 18 BRPM | HEART RATE: 56 BPM | TEMPERATURE: 98 F | DIASTOLIC BLOOD PRESSURE: 55 MMHG

## 2022-11-17 LAB
ANION GAP SERPL CALC-SCNC: 12 MMOL/L — SIGNIFICANT CHANGE UP (ref 7–14)
BUN SERPL-MCNC: 28 MG/DL — HIGH (ref 7–23)
CALCIUM SERPL-MCNC: 9.3 MG/DL — SIGNIFICANT CHANGE UP (ref 8.4–10.5)
CHLORIDE SERPL-SCNC: 99 MMOL/L — SIGNIFICANT CHANGE UP (ref 98–107)
CO2 SERPL-SCNC: 21 MMOL/L — LOW (ref 22–31)
CREAT SERPL-MCNC: 1.14 MG/DL — SIGNIFICANT CHANGE UP (ref 0.5–1.3)
CULTURE RESULTS: SIGNIFICANT CHANGE UP
CULTURE RESULTS: SIGNIFICANT CHANGE UP
EGFR: 53 ML/MIN/1.73M2 — LOW
GLUCOSE BLDC GLUCOMTR-MCNC: 176 MG/DL — HIGH (ref 70–99)
GLUCOSE BLDC GLUCOMTR-MCNC: 234 MG/DL — HIGH (ref 70–99)
GLUCOSE SERPL-MCNC: 192 MG/DL — HIGH (ref 70–99)
HCT VFR BLD CALC: 31.5 % — LOW (ref 34.5–45)
HGB BLD-MCNC: 10.5 G/DL — LOW (ref 11.5–15.5)
IRON SATN MFR SERPL: 21 % — SIGNIFICANT CHANGE UP (ref 14–50)
IRON SATN MFR SERPL: 68 UG/DL — SIGNIFICANT CHANGE UP (ref 30–160)
MAGNESIUM SERPL-MCNC: 2 MG/DL — SIGNIFICANT CHANGE UP (ref 1.6–2.6)
MCHC RBC-ENTMCNC: 28.4 PG — SIGNIFICANT CHANGE UP (ref 27–34)
MCHC RBC-ENTMCNC: 33.3 GM/DL — SIGNIFICANT CHANGE UP (ref 32–36)
MCV RBC AUTO: 85.1 FL — SIGNIFICANT CHANGE UP (ref 80–100)
NRBC # BLD: 0 /100 WBCS — SIGNIFICANT CHANGE UP (ref 0–0)
NRBC # FLD: 0 K/UL — SIGNIFICANT CHANGE UP (ref 0–0)
PHOSPHATE SERPL-MCNC: 4.3 MG/DL — SIGNIFICANT CHANGE UP (ref 2.5–4.5)
PLATELET # BLD AUTO: 273 K/UL — SIGNIFICANT CHANGE UP (ref 150–400)
POTASSIUM SERPL-MCNC: 4.6 MMOL/L — SIGNIFICANT CHANGE UP (ref 3.5–5.3)
POTASSIUM SERPL-SCNC: 4.6 MMOL/L — SIGNIFICANT CHANGE UP (ref 3.5–5.3)
RBC # BLD: 3.7 M/UL — LOW (ref 3.8–5.2)
RBC # FLD: 13.5 % — SIGNIFICANT CHANGE UP (ref 10.3–14.5)
SODIUM SERPL-SCNC: 132 MMOL/L — LOW (ref 135–145)
SPECIMEN SOURCE: SIGNIFICANT CHANGE UP
SPECIMEN SOURCE: SIGNIFICANT CHANGE UP
TIBC SERPL-MCNC: 321 UG/DL — SIGNIFICANT CHANGE UP (ref 220–430)
UIBC SERPL-MCNC: 253 UG/DL — SIGNIFICANT CHANGE UP (ref 110–370)
WBC # BLD: 10.79 K/UL — HIGH (ref 3.8–10.5)
WBC # FLD AUTO: 10.79 K/UL — HIGH (ref 3.8–10.5)

## 2022-11-17 PROCEDURE — 99239 HOSP IP/OBS DSCHRG MGMT >30: CPT | Mod: GC

## 2022-11-17 PROCEDURE — 99232 SBSQ HOSP IP/OBS MODERATE 35: CPT | Mod: GC

## 2022-11-17 RX ORDER — SODIUM CHLORIDE 9 MG/ML
1 INJECTION INTRAMUSCULAR; INTRAVENOUS; SUBCUTANEOUS
Qty: 90 | Refills: 0
Start: 2022-11-17 | End: 2022-12-16

## 2022-11-17 RX ORDER — ROPINIROLE 8 MG/1
1 TABLET, FILM COATED, EXTENDED RELEASE ORAL
Qty: 0 | Refills: 0 | DISCHARGE

## 2022-11-17 RX ORDER — LOSARTAN POTASSIUM 100 MG/1
1 TABLET, FILM COATED ORAL
Qty: 0 | Refills: 0 | DISCHARGE

## 2022-11-17 RX ORDER — FINERENONE 20 MG/1
1 TABLET, FILM COATED ORAL
Qty: 0 | Refills: 0 | DISCHARGE

## 2022-11-17 RX ADMIN — Medication 1 TABLET(S): at 12:25

## 2022-11-17 RX ADMIN — Medication 1: at 08:44

## 2022-11-17 RX ADMIN — MUPIROCIN 1 APPLICATION(S): 20 OINTMENT TOPICAL at 05:17

## 2022-11-17 RX ADMIN — HEPARIN SODIUM 5000 UNIT(S): 5000 INJECTION INTRAVENOUS; SUBCUTANEOUS at 05:16

## 2022-11-17 RX ADMIN — SODIUM CHLORIDE 1 GRAM(S): 9 INJECTION INTRAMUSCULAR; INTRAVENOUS; SUBCUTANEOUS at 13:02

## 2022-11-17 RX ADMIN — CHLORHEXIDINE GLUCONATE 1 APPLICATION(S): 213 SOLUTION TOPICAL at 05:21

## 2022-11-17 RX ADMIN — Medication 2: at 12:24

## 2022-11-17 RX ADMIN — SODIUM CHLORIDE 1 GRAM(S): 9 INJECTION INTRAMUSCULAR; INTRAVENOUS; SUBCUTANEOUS at 05:17

## 2022-11-17 RX ADMIN — Medication 1 MILLIGRAM(S): at 12:25

## 2022-11-17 RX ADMIN — ATENOLOL 50 MILLIGRAM(S): 25 TABLET ORAL at 05:16

## 2022-11-17 RX ADMIN — Medication 25 MICROGRAM(S): at 05:16

## 2022-11-17 NOTE — DISCHARGE NOTE PROVIDER - HOSPITAL COURSE
HPI:  66 yo F hx HTN, HLD, DM2, iron deficiency anemia (s/p iron transfusion yesterday), pw c/o intermittent dizziness described as room spinning, and sensation of heaviness and cramping to bilateral lower extremities. Patient states leg heaviness has been going on for a few months. Patient states for 2 weeks developed diarrhea, 3-4 watery BMs per day. Diarrhea has mostly resolved in the last 2 days. She starting antidiarrhea and antispasmodic. Patient has been having worsening dizziness and weakness for the last 2 weeks. Patient states leg cramps have been getting worse for the last 5 days with intermittent numbness and tingling b/l. Earlier today, patient was eating and went unresponsive with associated arm and eye twitching for 1-2 minutes according to family. Patient was stool incontinent during episode but has hx of stool incontinence at times. Patient also complaining of intermittent SOB this morning that has since resolved. Denies history of seizures or seizure disorders. Patient endorses history of hyponatremia in past after diarrhea. Of note, patient also started on Kerendia in September for CKD, baseline renal function unknown. In addition, patient received iron transfusion yesterday for iron deficiency anemia diagnosed a few months ago, unknown etiology or baseline.     ([facility]  [id number]) (11 Nov 2022 23:58)      Hospital course: Admitted to MICU for monitoring during correction of hyponatremia. Pt also with MAGUE and hyperkalemia. For elevated K+, patient given Insulin, D50 and Lokelma with improvement in K+. Hypo-osmolar euvolemic hyponatremia believed to be 2/2 diarrhea, urinary retention, diuretic use. For hyponatremia, started on 3% NaCl with improvement of Na to 116. Patient also received D5 and desmopressin due to increased UOP after [place] placement. Patient given additional 2% and 3% saline boluses with Na corrected to 125. Na increased to 127, given desmopressin to prevent overcorrection given large UOP, Pt downgraded to medicine floors on 11/14. Nephrology continued to follow. Pt started on salt tabs 1g TID with improvement in Na to 132. EEG without any seizures or epileptogenic foci. Pt with restless leg syndrome. Iron studies normal (pt with hx iron deficiency). Briefly started on ropinerole, but discontinued prior to discharge. PT recommending outpatient PT. Pt stable and clear for discharge. She should follow up with her PCP as well as nephrology within 1-2 weeks for repeat labwork.    HPI:  68 yo F hx HTN, HLD, DM2, iron deficiency anemia (s/p iron transfusion yesterday), pw c/o intermittent dizziness described as room spinning, and sensation of heaviness and cramping to bilateral lower extremities. Patient states leg heaviness has been going on for a few months. Patient states for 2 weeks developed diarrhea, 3-4 watery BMs per day. Diarrhea has mostly resolved in the last 2 days. She starting antidiarrhea and antispasmodic. Patient has been having worsening dizziness and weakness for the last 2 weeks. Patient states leg cramps have been getting worse for the last 5 days with intermittent numbness and tingling b/l. Earlier today, patient was eating and went unresponsive with associated arm and eye twitching for 1-2 minutes according to family. Patient was stool incontinent during episode but has hx of stool incontinence at times. Patient also complaining of intermittent SOB this morning that has since resolved. Denies history of seizures or seizure disorders. Patient endorses history of hyponatremia in past after diarrhea. Of note, patient also started on Kerendia in September for CKD, baseline renal function unknown. In addition, patient received iron transfusion yesterday for iron deficiency anemia diagnosed a few months ago, unknown etiology or baseline.     ([facility]  [id number]) (11 Nov 2022 23:58)      Hospital course: Admitted to MICU for monitoring during correction of hyponatremia. Pt also with MAGUE and hyperkalemia. For elevated K+, patient given Insulin, D50 and Lokelma with improvement in K+. Hypo-osmolar euvolemic hyponatremia believed to be 2/2 diarrhea, urinary retention, diuretic use. For hyponatremia, started on 3% NaCl with improvement of Na to 116. Patient also received D5 and desmopressin due to increased UOP after [place] placement. Patient given additional 2% and 3% saline boluses with Na corrected to 125. Na increased to 127, given desmopressin to prevent overcorrection given large UOP, Pt downgraded to medicine floors on 11/14. Nephrology continued to follow. Pt started on salt tabs 1g TID with improvement in Na to 132. EEG without any seizures or epileptogenic foci. Pt with restless leg syndrome. Iron studies normal (pt with hx iron deficiency). Briefly started on ropinerole, but discontinued prior to discharge. PT recommending outpatient PT. Pt stable and clear for discharge. She should follow up with her PCP as well as nephrology within 1-2 weeks for repeat labwork. Will hold kerendia on discharge as this medication my have contributed to patient's hyponatremia.    HPI:  66 yo F hx HTN, HLD, DM2, iron deficiency anemia (s/p iron transfusion yesterday), pw c/o intermittent dizziness described as room spinning, and sensation of heaviness and cramping to bilateral lower extremities. Patient states leg heaviness has been going on for a few months. Patient states for 2 weeks developed diarrhea, 3-4 watery BMs per day. Diarrhea has mostly resolved in the last 2 days. She starting antidiarrhea and antispasmodic. Patient has been having worsening dizziness and weakness for the last 2 weeks. Patient states leg cramps have been getting worse for the last 5 days with intermittent numbness and tingling b/l. Earlier today, patient was eating and went unresponsive with associated arm and eye twitching for 1-2 minutes according to family. Patient was stool incontinent during episode but has hx of stool incontinence at times. Patient also complaining of intermittent SOB this morning that has since resolved. Denies history of seizures or seizure disorders. Patient endorses history of hyponatremia in past after diarrhea. Of note, patient also started on Kerendia in September for CKD, baseline renal function unknown. In addition, patient received iron transfusion yesterday for iron deficiency anemia diagnosed a few months ago, unknown etiology or baseline.     ([facility]  [id number]) (11 Nov 2022 23:58)      Hospital course: Admitted to MICU for monitoring during correction of hyponatremia. Pt also with MAGUE and hyperkalemia. For elevated K+, patient given Insulin, D50 and Lokelma with improvement in K+. Hypo-osmolar euvolemic hyponatremia believed to be 2/2 diarrhea, urinary retention, diuretic use. For hyponatremia, started on 3% NaCl with improvement of Na to 116. Patient also received D5 and desmopressin due to increased UOP after [place] placement. Patient given additional 2% and 3% saline boluses with Na corrected to 125. Na increased to 127, given desmopressin to prevent overcorrection given large UOP, Pt downgraded to medicine floors on 11/14. Nephrology continued to follow. Pt started on salt tabs 1g TID with improvement in Na to 132. EEG without any seizures or epileptogenic foci. Pt with restless leg syndrome. Iron studies normal (pt with hx iron deficiency). Briefly started on ropinerole, but discontinued prior to discharge. PT recommending outpatient PT. Pt stable and clear for discharge. She should follow up with her PCP as well as nephrology within 1-2 weeks for repeat labwork so that decision can be made to continue or stop the salt tablets. Will hold kerendia, losartan on discharge as these medications my have contributed to patient's hyponatremia. Decision can be made what to restart when she follows up with nephrology.   HPI:  66 yo F hx HTN, HLD, DM2, iron deficiency anemia (s/p iron transfusion yesterday), pw c/o intermittent dizziness described as room spinning, and sensation of heaviness and cramping to bilateral lower extremities. Patient states leg heaviness has been going on for a few months. Patient states for 2 weeks developed diarrhea, 3-4 watery BMs per day. Diarrhea has mostly resolved in the last 2 days. She starting antidiarrhea and antispasmodic. Patient has been having worsening dizziness and weakness for the last 2 weeks. Patient states leg cramps have been getting worse for the last 5 days with intermittent numbness and tingling b/l. Earlier today, patient was eating and went unresponsive with associated arm and eye twitching for 1-2 minutes according to family. Patient was stool incontinent during episode but has hx of stool incontinence at times. Patient also complaining of intermittent SOB this morning that has since resolved. Denies history of seizures or seizure disorders. Patient endorses history of hyponatremia in past after diarrhea. Of note, patient also started on Kerendia in September for CKD, baseline renal function unknown. In addition, patient received iron transfusion yesterday for iron deficiency anemia diagnosed a few months ago, unknown etiology or baseline.     ([facility]  [id number]) (11 Nov 2022 23:58)      Hospital course: Admitted to MICU for monitoring during correction of hyponatremia. Pt also with MAGUE and hyperkalemia. For elevated K+, patient given Insulin, D50 and Lokelma with improvement in K+. Hypo-osmolar euvolemic hyponatremia believed to be 2/2 diarrhea, urinary retention, diuretic use. For hyponatremia, started on 3% NaCl with improvement of Na to 116. Patient also received D5 and desmopressin due to increased UOP after [place] placement. Patient given additional 2% and 3% saline boluses with Na corrected to 125. Na increased to 127, given desmopressin to prevent overcorrection given large UOP, Pt downgraded to medicine floors on 11/14. Nephrology continued to follow. Pt started on salt tabs 1g TID with improvement in Na to 132. EEG without any seizures or epileptogenic foci. Pt with restless leg syndrome. Iron studies normal (pt with hx iron deficiency). Briefly started on ropinerole, but discontinued prior to discharge. PT recommending outpatient PT. Pt stable and clear for discharge. She should follow up with her PCP as well as nephrology within 1-2 weeks for repeat labwork so that decision can be made to continue or stop the salt tablets. Will hold kerendia, losartan on discharge as these medications my have contributed to patient's hyponatremia. Decision can be made what to restart when she is set up with a nephrologist.   HPI:  68 yo F hx HTN, HLD, DM2, iron deficiency anemia (s/p iron transfusion yesterday), pw c/o intermittent dizziness described as room spinning, and sensation of heaviness and cramping to bilateral lower extremities. Patient states leg heaviness has been going on for a few months. Patient states for 2 weeks developed diarrhea, 3-4 watery BMs per day. Diarrhea has mostly resolved in the last 2 days. She starting antidiarrhea and antispasmodic. Patient has been having worsening dizziness and weakness for the last 2 weeks. Patient states leg cramps have been getting worse for the last 5 days with intermittent numbness and tingling b/l. Earlier today, patient was eating and went unresponsive with associated arm and eye twitching for 1-2 minutes according to family. Patient was stool incontinent during episode but has hx of stool incontinence at times. Patient also complaining of intermittent SOB this morning that has since resolved. Denies history of seizures or seizure disorders. Patient endorses history of hyponatremia in past after diarrhea. Of note, patient also started on Kerendia in September for CKD, baseline renal function unknown. In addition, patient received iron transfusion yesterday for iron deficiency anemia diagnosed a few months ago, unknown etiology or baseline.     ([facility]  [id number]) (11 Nov 2022 23:58)      Hospital course: Admitted to MICU for monitoring during correction of hyponatremia. Pt also with MAGUE and hyperkalemia. For elevated K+, patient given Insulin, D50 and Lokelma with improvement in K+. Hypo-osmolar euvolemic hyponatremia believed to be 2/2 diarrhea, urinary retention, diuretic use. For hyponatremia, started on 3% NaCl with improvement of Na to 116. Patient also received D5 and desmopressin due to increased UOP after [place] placement. Patient given additional 2% and 3% saline boluses with Na corrected to 125. Na increased to 127, given desmopressin to prevent overcorrection given large UOP, Pt downgraded to medicine floors on 11/14. Nephrology continued to follow. Pt started on salt tabs 1g TID with improvement in Na to 132. EEG without any seizures or epileptogenic foci. Pt with restless leg syndrome. Iron studies normal (pt with hx iron deficiency). Briefly started on ropinerole, but discontinued prior to discharge. PT recommending outpatient PT. Pt stable and clear for discharge. She should follow up with her PCP as well as nephrology within 1-2 weeks for repeat labwork so that decision can be made to continue or stop the salt tablets. Will hold kerendia, losartan on discharge as these medications my have contributed to patient's hyponatremia. Decision can be made what to restart when she is set up with a nephrologist.    Daughter updated at bedside

## 2022-11-17 NOTE — PROGRESS NOTE ADULT - ATTENDING COMMENTS
67 year old female with HTN, DM2, ZIGGY (s/p iron transfusion), p/w generalized weakness, dizziness, and suspected seizure at home found to have Na 112, admitted to MICU, now downgraded to medical floors. HypoNa suspected in setting of diarrhea and urinary retention, appreciate Renal input, now up to 132. C/w fluid restriction and salt tabs per renal. No further seizure like activity here, no prior hx of seizures, CTH unremarkable, EEG unremarkable, suspected seizure in setting of hypoNa which is improving. Optimized for DC home today with close outpt follow up including BMP next week. Daughter updated at bedside. DC planning 35 minutes,

## 2022-11-17 NOTE — PROGRESS NOTE ADULT - ASSESSMENT
67 year old female PMHx HTN, HLD, DM2, iron deficiency anemia (s/p iron transfusion yesterday) presenting with generalized weakness, dizziness and lower extremity heaviness admitted to the MICU for hyponatremia. Transferred to floors with improving hyponatremia, s/p reynolds catheter removal with passed TOV. S/P vEEG. 67 year old female PMHx HTN, HLD, DM2, iron deficiency anemia (s/p iron transfusion yesterday) presenting with generalized weakness, dizziness and lower extremity heaviness admitted to the MICU for hyponatremia. Transferred to floors with improving hyponatremia, s/p reynolds catheter removal with passed TOV. S/P vEEG. Improved Na level, stable for DC home.

## 2022-11-17 NOTE — DISCHARGE NOTE PROVIDER - NSDCMRMEDTOKEN_GEN_ALL_CORE_FT
atenolol 50 mg oral tablet: 1 tab(s) orally once a day  Calcium 600+D Plus Minerals oral tablet, chewable: 1 tab(s) orally 2 times a day  Cozaar 50 mg oral tablet: 1 tab(s) orally once a day  dicyclomine 10 mg oral capsule: 1 cap(s) orally 3 times a day  Drisdol 1.25 mg (50,000 intl units) oral capsule: 1 cap(s) orally once a week  folic acid 1 mg oral tablet: 1 tab(s) orally once a day  Kerendia 10 mg oral tablet: 1 tab(s) orally once a day  loratadine 10 mg oral tablet: 1 tab(s) orally once a day  metFORMIN 500 mg oral tablet: 1 tab(s) orally 2 times a day  Multiple Vitamins oral tablet: 1 tab(s) orally once a day  Peridex 0.12% mucous membrane liquid: 15 milliliter(s) mucous membrane 2 times a day  Physical Therapy:   rOPINIRole 1 mg oral tablet: 1 tab(s) orally once a day (at bedtime)  simvastatin 40 mg oral tablet: 1 tab(s) orally once a day (at bedtime)  Synthroid 25 mcg (0.025 mg) oral tablet: 1 tab(s) orally once a day  Vitamin B-12 1000 mcg oral tablet: 1 tab(s) orally once a day   atenolol 50 mg oral tablet: 1 tab(s) orally once a day  Calcium 600+D Plus Minerals oral tablet, chewable: 1 tab(s) orally 2 times a day  Cozaar 50 mg oral tablet: 1 tab(s) orally once a day  dicyclomine 10 mg oral capsule: 1 cap(s) orally 3 times a day  Drisdol 1.25 mg (50,000 intl units) oral capsule: 1 cap(s) orally once a week  folic acid 1 mg oral tablet: 1 tab(s) orally once a day  loratadine 10 mg oral tablet: 1 tab(s) orally once a day  metFORMIN 500 mg oral tablet: 1 tab(s) orally 2 times a day  Multiple Vitamins oral tablet: 1 tab(s) orally once a day  Peridex 0.12% mucous membrane liquid: 15 milliliter(s) mucous membrane 2 times a day  Physical Therapy:   rOPINIRole 1 mg oral tablet: 1 tab(s) orally once a day (at bedtime)  simvastatin 40 mg oral tablet: 1 tab(s) orally once a day (at bedtime)  Synthroid 25 mcg (0.025 mg) oral tablet: 1 tab(s) orally once a day  Vitamin B-12 1000 mcg oral tablet: 1 tab(s) orally once a day   atenolol 50 mg oral tablet: 1 tab(s) orally once a day  Calcium 600+D Plus Minerals oral tablet, chewable: 1 tab(s) orally 2 times a day  Drisdol 1.25 mg (50,000 intl units) oral capsule: 1 cap(s) orally once a week  folic acid 1 mg oral tablet: 1 tab(s) orally once a day  loratadine 10 mg oral tablet: 1 tab(s) orally once a day  metFORMIN 500 mg oral tablet: 1 tab(s) orally 2 times a day  Multiple Vitamins oral tablet: 1 tab(s) orally once a day  Peridex 0.12% mucous membrane liquid: 15 milliliter(s) mucous membrane 2 times a day  Physical Therapy:   simvastatin 40 mg oral tablet: 1 tab(s) orally once a day (at bedtime)  sodium chloride 1 g oral tablet: 1 tab(s) orally 3 times a day  Synthroid 25 mcg (0.025 mg) oral tablet: 1 tab(s) orally once a day  Vitamin B-12 1000 mcg oral tablet: 1 tab(s) orally once a day

## 2022-11-17 NOTE — DISCHARGE NOTE PROVIDER - NSDCCPCAREPLAN_GEN_ALL_CORE_FT
PRINCIPAL DISCHARGE DIAGNOSIS  Diagnosis: Hyponatremia  Assessment and Plan of Treatment: You presented to the hospital with a history of a diarrheal illness, with inability to urinate, confusion, muscle cramps, and what sounded like a witnessed seizure based on history provided by your family. You were found to have a low blood concentration of an electrolyte called sodium. You were admitted to the ICU for management of this low level and received fluids to help bring your level up. You also had a catheter placed to drain your urine. Once that catheter was removed you were able to urinate without issue. You were seen by the kidney specialists who made recommendations on helping get your sodium level back up towards normal. Your level responded, you started to feel better and were able to leave the ICU. We limited the amount of fluids you took in and gave you salt tablets by mouth, which also helped your sodium level. On discharge you will continue taking salt tablets (1 gram three times a day by mouth and limiting your fluid intake to 1-1.5 liters a day. We held some of your home medications and you will continue to not take losartan and kerendia when you leave. You should follow up with your primary care doctor and the kidney doctors within 1-2 weeks of leaving the hospital to get your blood tested again. When you follow up with the kidney doctors the decision can be made on what medications to restart. If you start to feel confused, have muscle cramps again, or have another episode of passing out you should immediately come back to the hospital.      SECONDARY DISCHARGE DIAGNOSES  Diagnosis: Restless leg syndrome  Assessment and Plan of Treatment:     Diagnosis: Acute kidney injury superimposed on CKD  Assessment and Plan of Treatment:      PRINCIPAL DISCHARGE DIAGNOSIS  Diagnosis: Hyponatremia  Assessment and Plan of Treatment: You presented to the hospital with a history of a diarrheal illness, with inability to urinate, confusion, muscle cramps, and what sounded like a witnessed seizure based on history provided by your family. You were found to have a low blood concentration of an electrolyte called sodium. You were admitted to the ICU for management of this low level and received fluids to help bring your level up. You also had a catheter placed to drain your urine. Once that catheter was removed you were able to urinate without issue. You were seen by the kidney specialists who made recommendations on helping get your sodium level back up towards normal. Your level responded, you started to feel better and were able to leave the ICU. We limited the amount of fluids you took in and gave you salt tablets by mouth, which also helped your sodium level. On discharge you will continue taking salt tablets (1 gram three times a day by mouth and limiting your fluid intake to 1-1.5 liters a day. We held some of your home medications and you will continue to not take losartan and kerendia when you leave. You should follow up with your primary care doctor and the kidney doctors within 1-2 weeks of leaving the hospital to get your blood tested again. When you follow up with the kidney doctors the decision can be made on what medications to restart. If you start to feel confused, have muscle cramps again, or have another episode of passing out you should immediately come back to the hospital.      SECONDARY DISCHARGE DIAGNOSES  Diagnosis: Restless leg syndrome  Assessment and Plan of Treatment: You endorsed a feeling of your right leg feeling like it needed to be continuously moved, something we would call restless leg syndrome. Usually the most common cause of this is iron defiency, which you have a history of. We checked your levels and your levels were normal. The best treatment for this will be continuing to follow with your primary care doctor to get your iron levels checked and to be assessed for future need for iron infusion.    Diagnosis: Acute kidney injury superimposed on CKD  Assessment and Plan of Treatment: When you came in you were not urinating and we found you to be retaining urine in your bladder. This caused your kidney function to decrease. After we had the catheter in and you were draining urine we saw your function get better. After the catheter was removed you continued to be able to urinate without issue. We did a sonogram of your kidney and bladder and it showed you may have what we call chronic kidney disease, which is a decrease in your kidney function at baseline. We recommend you following up with the kidney doctors when you leave the hospital.     PRINCIPAL DISCHARGE DIAGNOSIS  Diagnosis: Hyponatremia  Assessment and Plan of Treatment: You presented to the hospital with a history of a diarrheal illness, with inability to urinate, confusion, muscle cramps, and what sounded like a witnessed seizure based on history provided by your family. You were found to have a low blood concentration of an electrolyte called sodium. You were admitted to the ICU for management of this low level and received fluids to help bring your level up. You also had a catheter placed to drain your urine. Once that catheter was removed you were able to urinate without issue. You were seen by the kidney specialists who made recommendations on helping get your sodium level back up towards normal. Your level responded, you started to feel better and were able to leave the ICU. We limited the amount of fluids you took in and gave you salt tablets by mouth, which also helped your sodium level. On discharge you will continue taking salt tablets (1 gram three times a day by mouth and limiting your fluid intake to 1-1.5 liters a day. We held some of your home medications and you will continue to not take losartan and kerendia when you leave. You should follow up with your primary care doctor within 1-2 weeks of leaving the hospital to get your blood tested again. When you follow up with your primary care doctor he can set you up with a kidney doctor so the decision can be made on what medications to restart. If you start to feel confused, have muscle cramps again, or have another episode of passing out you should immediately come back to the hospital.      SECONDARY DISCHARGE DIAGNOSES  Diagnosis: Restless leg syndrome  Assessment and Plan of Treatment: You endorsed a feeling of your right leg feeling like it needed to be continuously moved, something we would call restless leg syndrome. Usually the most common cause of this is iron defiency, which you have a history of. We checked your levels and your levels were normal. The best treatment for this will be continuing to follow with your primary care doctor to get your iron levels checked and to be assessed for future need for iron infusion.    Diagnosis: Acute kidney injury superimposed on CKD  Assessment and Plan of Treatment: When you came in you were not urinating and we found you to be retaining urine in your bladder. This caused your kidney function to decrease. After we had the catheter in and you were draining urine we saw your function get better. After the catheter was removed you continued to be able to urinate without issue. We did a sonogram of your kidney and bladder and it showed you may have what we call chronic kidney disease, which is a decrease in your kidney function at baseline. We recommend you following up with your primary care doctor who will set you up with the kidney doctors when you leave the hospital.

## 2022-11-17 NOTE — DISCHARGE NOTE PROVIDER - NSFOLLOWUPCLINICS_GEN_ALL_ED_FT
[facility]  Nephrology  [address] [place] [address], [address] [place] [address]  Phone: [phone]  Fax:   Follow Up Time: 1 week

## 2022-11-17 NOTE — PROGRESS NOTE ADULT - PROBLEM SELECTOR PLAN 7
- History of iron deficiency, could possibly explain occurrence  - F/U iron studies  - Ordered for 1mg ropinirole at bedtime - History of iron deficiency, could possibly explain occurrence  - Iron studies normal  - Ordered for 1mg ropinirole at bedtime, will not go home on it  - Will have her follow up on iron studies while outpatient

## 2022-11-17 NOTE — DISCHARGE NOTE NURSING/CASE MANAGEMENT/SOCIAL WORK - PATIENT PORTAL LINK FT
You can access the FollowMyHealth Patient Portal offered by [facility] by registering at the following website: [email or web address]. By joining [facility]’s FollowMyHealth portal, you will also be able to view your health information using other applications (apps) compatible with our system.

## 2022-11-17 NOTE — DISCHARGE NOTE PROVIDER - PROVIDER TOKENS
PROVIDER:[TOKEN:[[id number]],FOLLOWUP:[1 week],ESTABLISHEDPATIENT:[T]] PROVIDER:[TOKEN:[[id number]],SCHEDULEDAPPT:[11/22/2022],SCHEDULEDAPPTTIME:[11:00 AM],ESTABLISHEDPATIENT:[T]]

## 2022-11-17 NOTE — PROGRESS NOTE ADULT - PROBLEM SELECTOR PLAN 8
- DVT: Heparin 5000U subq q8hr  - Diet: CC diet, renal, halal, 1L fluid restriction - DVT: Heparin 5000U subq q8hr  - Diet: CC diet, renal, halal, 1L fluid restriction  - Dispo: [place] home

## 2022-11-17 NOTE — PROGRESS NOTE ADULT - PROBLEM SELECTOR PLAN 2
Pt admitted with hyperkalemia in the setting of CKD, Losartan, Finerinone, hyperglycemia and likely urine retention. Serum potassium was elevated at 7.4 on 11/11. Pt received medical management. Continue to hold Losartan and Finerinone. Serum potassium WNL (4.6) today. Low K diet. Monitor serum potassium

## 2022-11-17 NOTE — PROGRESS NOTE ADULT - PROBLEM SELECTOR PLAN 3
Pt. with history of CKD. As per review of previous outpatient labs, Scr was elevated at 1.2 on 5/5/22 and was 1.21 on 8/18/22. [facility] [place] Scr was 1.53 on 11/11. Scr improved to 0.98 on 11/13/22. Scr is elevated/stable at  1.11 today. Pt currently non oliguric, UOP ~ 1L in last 24 hrs. Kidney sonogram showing increased echogenicity suggestive of chronic medical disease but no hydronephrosis . Pt. with MAGUE on CKD in setting of recent diarrhea and urine retention. Monitor labs and urine output. Avoid nephrotoxins. Dose medications as per eGFR.

## 2022-11-17 NOTE — PROGRESS NOTE ADULT - PROBLEM SELECTOR PLAN 2
- Patient prior to hospital admission noted to become unresponsive with associated arm and eye twitching lasting for 1-2 minutes with incontinence of bowels  - vEEG without signs of epileptiform activity per report  - Likely cause of seizure patient's hyponatremia - Patient prior to hospital admission noted to become unresponsive with associated arm and eye twitching lasting for 1-2 minutes with incontinence of bowels  - vEEG without signs of epileptiform activity per report  - Likely cause of seizure was patient's hyponatremia

## 2022-11-17 NOTE — PROGRESS NOTE ADULT - SUBJECTIVE AND OBJECTIVE BOX
Medicine Progress Note    William Osorio MD  PGY1 Internal Medicine  Available on TEAMS  [facility]: [phone], [facility]: [id number]    Patient is a 67y old  Female who presents with a chief complaint of Dizziness/hyponatremia (16 Nov 2022 07:35)      SUBJECTIVE / OVERNIGHT EVENTS: This AM, patient seen and examined at bedside.      MEDICATIONS  (STANDING):  atenolol  Tablet 50 milliGRAM(s) Oral daily  chlorhexidine 2% Cloths 1 Application(s) Topical <User Schedule>  dextrose 5%. 1000 milliLiter(s) (100 mL/Hr) IV Continuous <Continuous>  dextrose 5%. 1000 milliLiter(s) (50 mL/Hr) IV Continuous <Continuous>  dextrose 50% Injectable 25 Gram(s) IV Push once  dextrose 50% Injectable 12.5 Gram(s) IV Push once  dextrose 50% Injectable 25 Gram(s) IV Push once  folic acid 1 milliGRAM(s) Oral daily  glucagon  Injectable 1 milliGRAM(s) IntraMuscular once  heparin   Injectable 5000 Unit(s) SubCutaneous every 8 hours  insulin lispro (ADMELOG) corrective regimen sliding scale   SubCutaneous three times a day before meals  insulin lispro (ADMELOG) corrective regimen sliding scale   SubCutaneous at bedtime  levothyroxine 25 MICROGram(s) Oral daily  melatonin 9 milliGRAM(s) Oral at bedtime  multivitamin 1 Tablet(s) Oral daily  mupirocin 2% Ointment 1 Application(s) Both Nostrils two times a day  rOPINIRole 1 milliGRAM(s) Oral at bedtime  simvastatin 40 milliGRAM(s) Oral at bedtime  sodium chloride 1 Gram(s) Oral three times a day    MEDICATIONS  (PRN):  dextrose Oral Gel 15 Gram(s) Oral once PRN Blood Glucose LESS THAN 70 milliGRAM(s)/deciliter    CAPILLARY BLOOD GLUCOSE      POCT Blood Glucose.: 291 mg/dL (16 Nov 2022 21:39)  POCT Blood Glucose.: 198 mg/dL (16 Nov 2022 17:27)  POCT Blood Glucose.: 205 mg/dL (16 Nov 2022 12:18)  POCT Blood Glucose.: 172 mg/dL (16 Nov 2022 08:45)    I&O's Summary      PHYSICAL EXAM:  Vital Signs Last 24 Hrs  T(C): 36.4 (17 Nov 2022 05:13), Max: 36.9 (16 Nov 2022 21:51)  T(F): 97.6 (17 Nov 2022 05:13), Max: 98.5 (16 Nov 2022 21:51)  HR: 77 (17 Nov 2022 05:13) (57 - 77)  BP: 139/67 (17 Nov 2022 05:13) (125/47 - 149/65)  BP(mean): --  RR: 16 (17 Nov 2022 05:13) (16 - 18)  SpO2: 100% (17 Nov 2022 05:13) (100% - 100%)    Parameters below as of 17 Nov 2022 05:13  Patient On (Oxygen Delivery Method): room air      CONSTITUTIONAL: NAD, well-developed, well-groomed  HEENT: Moist oral mucosa, no pharyngeal injection or exudates  RESPIRATORY: Normal respiratory effort; lungs are clear to auscultation bilaterally  CARDIOVASCULAR: Regular rate and rhythm, normal S1 and S2, no murmur/rub/gallop, no lower extremity edema, peripheral pulses are 2+ bilaterally  ABDOMEN: Soft, nondistended, nontender to palpation, normoactive bowel sounds, no rebound/guarding  PSYCH: A+O to person, place, and time  NEUROLOGY: Facial expression appears symmetric, no gross sensory deficits appreciated, moving all extremities spontaneously  SKIN: No rashes; no palpable lesions    LABS:                        10.3   10.55 )-----------( 272      ( 16 Nov 2022 07:01 )             30.4     11-16    128<L>  |  96<L>  |  28<H>  ----------------------------<  197<H>  5.0   |  18<L>  |  1.20    Ca    8.7      16 Nov 2022 17:52  Phos  3.6     11-16  Mg     2.00     11-16                  SARS-CoV-2: NotDetec (11 Nov 2022 23:38)      RADIOLOGY & ADDITIONAL TESTS:  Imaging from Last 24 Hours: Medicine Progress Note    William Osorio MD  PGY1 Internal Medicine  Available on TEAMS  [facility]: [phone], [facility]: [id number]    Patient is a 67y old  Female who presents with a chief complaint of Dizziness/hyponatremia (16 Nov 2022 07:35)      SUBJECTIVE / OVERNIGHT EVENTS: Sodium of 128 overnight. This AM, patient seen and examined at bedside. Daughter at bedside providing translation (patient refused services). Patient without acute complaint. Voiding freely, no chest pain, no shortness of breath. No abdominal pain. Tolerating PO diet.      MEDICATIONS  (STANDING):  atenolol  Tablet 50 milliGRAM(s) Oral daily  chlorhexidine 2% Cloths 1 Application(s) Topical <User Schedule>  dextrose 5%. 1000 milliLiter(s) (100 mL/Hr) IV Continuous <Continuous>  dextrose 5%. 1000 milliLiter(s) (50 mL/Hr) IV Continuous <Continuous>  dextrose 50% Injectable 25 Gram(s) IV Push once  dextrose 50% Injectable 12.5 Gram(s) IV Push once  dextrose 50% Injectable 25 Gram(s) IV Push once  folic acid 1 milliGRAM(s) Oral daily  glucagon  Injectable 1 milliGRAM(s) IntraMuscular once  heparin   Injectable 5000 Unit(s) SubCutaneous every 8 hours  insulin lispro (ADMELOG) corrective regimen sliding scale   SubCutaneous three times a day before meals  insulin lispro (ADMELOG) corrective regimen sliding scale   SubCutaneous at bedtime  levothyroxine 25 MICROGram(s) Oral daily  melatonin 9 milliGRAM(s) Oral at bedtime  multivitamin 1 Tablet(s) Oral daily  mupirocin 2% Ointment 1 Application(s) Both Nostrils two times a day  rOPINIRole 1 milliGRAM(s) Oral at bedtime  simvastatin 40 milliGRAM(s) Oral at bedtime  sodium chloride 1 Gram(s) Oral three times a day    MEDICATIONS  (PRN):  dextrose Oral Gel 15 Gram(s) Oral once PRN Blood Glucose LESS THAN 70 milliGRAM(s)/deciliter    CAPILLARY BLOOD GLUCOSE      POCT Blood Glucose.: 291 mg/dL (16 Nov 2022 21:39)  POCT Blood Glucose.: 198 mg/dL (16 Nov 2022 17:27)  POCT Blood Glucose.: 205 mg/dL (16 Nov 2022 12:18)  POCT Blood Glucose.: 172 mg/dL (16 Nov 2022 08:45)    I&O's Summary      PHYSICAL EXAM:  Vital Signs Last 24 Hrs  T(C): 36.4 (17 Nov 2022 05:13), Max: 36.9 (16 Nov 2022 21:51)  T(F): 97.6 (17 Nov 2022 05:13), Max: 98.5 (16 Nov 2022 21:51)  HR: 77 (17 Nov 2022 05:13) (57 - 77)  BP: 139/67 (17 Nov 2022 05:13) (125/47 - 149/65)  RR: 16 (17 Nov 2022 05:13) (16 - 18)  SpO2: 100% (17 Nov 2022 05:13) (100% - 100%)    Parameters below as of 17 Nov 2022 05:13  Patient On (Oxygen Delivery Method): room air      CONSTITUTIONAL: NAD  HEENT: Moist oral mucosa, no pharyngeal injection or exudates  RESPIRATORY: Normal respiratory effort, lungs are clear to auscultation bilaterally  CARDIOVASCULAR: Regular rate and rhythm, normal S1 and S2, no murmur/rub/gallop, no lower extremity edema, peripheral pulses are 2+ bilaterally  ABDOMEN: Soft, nondistended, nontender to palpation, normoactive bowel sounds, no rebound/guarding  PSYCH: A+O to person, place, and time  NEUROLOGY: Facial expression appears symmetric, no gross sensory deficits appreciated, moving all extremities spontaneously  SKIN: No rashes; no palpable lesions    LABS:                        10.3   10.55 )-----------( 272      ( 16 Nov 2022 07:01 )             30.4     11-16    128<L>  |  96<L>  |  28<H>  ----------------------------<  197<H>  5.0   |  18<L>  |  1.20    Ca    8.7      16 Nov 2022 17:52  Phos  3.6     11-16  Mg     2.00     11-16                  SARS-CoV-2: NotDetec (11 Nov 2022 23:38)      RADIOLOGY & ADDITIONAL TESTS:  Imaging from Last 24 Hours: None

## 2022-11-17 NOTE — PROGRESS NOTE ADULT - PROVIDER SPECIALTY LIST ADULT
MICU
Internal Medicine
MICU
MICU
Nephrology
Internal Medicine
Internal Medicine

## 2022-11-17 NOTE — PROGRESS NOTE ADULT - PROBLEM SELECTOR PLAN 1
- Na this AM of 126, last serum osm of 281 on 11/14, urine osm of 651 on 11/15 with urine sodium of 113 on 11/15  - Starting 1g NaCl tabs TID, reassessing BMPs q12hr  - Fluid restrict to <1L, avoid hypotonic fluids, avoid greater than 6-8 increase in Na over 24 hour period  - CT A/P w/ IV contrast and CT head non-con without acute pathology, thought to be related to her diarrhea and her urinary retention  - Nephrology continuing to follow - Na this AM of 132  - Starting 1g NaCl tabs TID, will go home on it, will F/U with PCP for repeat BMP  - Fluid restrict to <1L, avoid hypotonic fluids, avoid greater than 6-8 increase in Na over 24 hour period  - CT A/P w/ IV contrast and CT head non-con without acute pathology, thought to be related to her diarrhea and her urinary retention  - Nephrology continuing to follow

## 2022-11-17 NOTE — PROGRESS NOTE ADULT - NSPROGADDITIONALINFOA_GEN_ALL_CORE
MAGUE and Hyperkalemia resolving   hyponatremia more stable over the past 24-48 hours.   continue  Salt tabs as outlined above upon dc   continue with fluid restriction to 1-1.5 liter  Assessment and plan as outlined above. Monitor labs and urine output. Avoid any potential nephrotoxins. Dose medications as per eGFR.  Further detailed plan of management and recommendation as outlined above by the renal fellow.  plan discussed with pt and daughter at bedside  Upon discharge please make appointment with Nephrology clinic. For scheduling please email Nephrology at [email or web address]

## 2022-11-17 NOTE — PROGRESS NOTE ADULT - PROBLEM SELECTOR PLAN 1
Pt admitted with symptomatic hypo-osmolar euvolemic hyponatremia in the setting of diarrhea and urine retention. Pt. received IV HTS 3% on 11/11. SNa improved to 116 on AM labs done on 11/12/22. Pt. received desmopressin by primary medical/ICU team for high urine output (after Lao catheter placement). Pt. with history of hyponatremia in the past (2015). SNa however WNL on outpatient labs done in May and August 2022. SOsm was low at 267, UOsm was 209, and Tyler inappropriately elevated at 54. Pt with increased UOP after Lao catheter placement, suggesting of urine retention. Pt received D5W on 11/12/22 with concerns for rapid correction. SNa was low at 118 on 11/13/22. Received  2% HTS at 30 cc/hr x 4 hours. Sna was low/stable at 120 in AM on 11/14/22. Repeat SNa improved to 127 in PM on 11/14/22. Pt received DDAVP 2 mcg IV on 11/14/22.   Sna is stable at 132 today (11/17/22).   continue salt tabs 1 gm PO TID. . Avoid hypotonic fluids. Fluid restriction < 1L.

## 2022-11-17 NOTE — DISCHARGE NOTE PROVIDER - CARE PROVIDER_API CALL
Blaise Rodríguez  INTERNAL MEDICINE  [id number] [place] [address]  Phone: [phone]  Fax: [phone]  Established Patient  Follow Up Time: 1 week   Blaise Rodríguez  INTERNAL MEDICINE  [id number] [place] [address]  Phone: [phone]  Fax: [phone]  Established Patient  Scheduled Appointment: 11/22/2022 11:00 AM

## 2023-02-02 PROBLEM — Z00.00 ENCOUNTER FOR PREVENTIVE HEALTH EXAMINATION: Status: ACTIVE | Noted: 2023-02-02

## 2023-02-08 ENCOUNTER — TRANSCRIPTION ENCOUNTER (OUTPATIENT)
Age: 68
End: 2023-02-08

## 2023-02-08 ENCOUNTER — APPOINTMENT (OUTPATIENT)
Dept: GASTROENTEROLOGY | Facility: CLINIC | Age: 68
End: 2023-02-08
Payer: COMMERCIAL

## 2023-02-08 VITALS
WEIGHT: 119 LBS | TEMPERATURE: 97 F | RESPIRATION RATE: 14 BRPM | SYSTOLIC BLOOD PRESSURE: 126 MMHG | DIASTOLIC BLOOD PRESSURE: 74 MMHG | OXYGEN SATURATION: 99 % | HEART RATE: 68 BPM | HEIGHT: 60 IN | BODY MASS INDEX: 23.36 KG/M2

## 2023-02-08 DIAGNOSIS — Z86.2 PERSONAL HISTORY OF DISEASES OF THE BLOOD AND BLOOD-FORMING ORGANS AND CERTAIN DISORDERS INVOLVING THE IMMUNE MECHANISM: ICD-10-CM

## 2023-02-08 PROCEDURE — 99072 ADDL SUPL MATRL&STAF TM PHE: CPT

## 2023-02-08 PROCEDURE — 99204 OFFICE O/P NEW MOD 45 MIN: CPT

## 2023-02-08 RX ORDER — POLYETHYLENE GLYCOL-3350 AND ELECTROLYTES 236; 6.74; 5.86; 2.97; 22.74 G/274.31G; G/274.31G; G/274.31G; G/274.31G; G/274.31G
236 POWDER, FOR SOLUTION ORAL
Qty: 4000 | Refills: 0 | Status: ACTIVE | COMMUNITY
Start: 2023-02-08 | End: 1900-01-01

## 2023-02-08 NOTE — PHYSICAL EXAM
[Alert] : alert [Normal Voice/Communication] : normal voice/communication [Healthy Appearing] : healthy appearing [No Acute Distress] : no acute distress [Sclera] : the sclera and conjunctiva were normal [Hearing Threshold Finger Rub Not [place]] : hearing was normal [Normal Lips/Gums] : the lips and gums were normal [Oropharynx] : the oropharynx was normal [Normal Appearance] : the appearance of the neck was normal [No Neck Mass] : no neck mass was observed [No Respiratory Distress] : no respiratory distress [No Acc Muscle Use] : no accessory muscle use [Respiration, Rhythm And Depth] : normal respiratory rhythm and effort [Auscultation Breath Sounds / Voice Sounds] : lungs were clear to auscultation bilaterally [Heart Rate And Rhythm] : heart rate was normal and rhythm regular [Normal S1, S2] : normal S1 and S2 [Murmurs] : no murmurs [Bowel Sounds] : normal bowel sounds [Abdomen Tenderness] : non-tender [No Masses] : no abdominal mass palpated [Abdomen Soft] : soft [] : no hepatosplenomegaly [Oriented To Time, Place, And Person] : oriented to person, place, and time

## 2023-02-08 NOTE — REVIEW OF SYSTEMS
[Recent Weight Loss (___ Lbs)] : recent [unfilled] ~Ulb weight loss [Abdominal Pain] : abdominal pain [Diarrhea] : diarrhea [Negative] : Heme/Lymph [As Noted in HPI] : as noted in HPI

## 2023-02-08 NOTE — HISTORY OF PRESENT ILLNESS
[FreeTextEntry1] : 68 yo female, with iron def anemia. She was hospitalized at [facility] Nov for hyponatremia and anemia, with hb 9.7, Na 114, and potassium 7.2, felt to be due to diuretics and diarrhea. She was to MICU and [facility]. She is Seen in accompaniment with her dtr. Lost 9 lbs since Nov. Has had chronic loose bms, occ gerd. She doesn't recall having had a colonoscopy.Originally from [facility].\par \par Reviewed hospital course records for this consult ( labs, notes, Imaging) [de-identified] : 11/2022 gallstones

## 2023-02-08 NOTE — ASSESSMENT
[FreeTextEntry1] : 68 yo female, with iron def anemia. She was hospitalized at [facility] Nov for hyponatremia and anemia, with hb 9.7, Na 114, and potassium 7.2, felt to be due to diuretics and diarrhea. She was to [facility] and [facility]. She is Seen in accompaniment with her dtr. Lost 9 lbs since Nov. Has had chronic loose bms, occ gerd. She doesn't recall having had a colonoscopy.Originally from [facility].\par \par Reviewed hospital course records for this consult ( labs, notes, Imaging)\par \par IMP:\par 1. iron def anemia\par 2 Colon cancer screening\par 3. GERD\par 4. Hx of hyponatremia, hyperkalemia..\par \par PLAN:\par \par 1. She  was advised to undergo endoscopy to which she agreed. The procedure will be performed in [facility] with the assistance of an anesthesiologist. The procedure was explained in detail and she understood the risks of the procedure not limited  to infection, bleeding, perforation, risk of anesthesia and risk of IV site problems,emergency surgery, missed lesions  or non-diagnosis of stomach or esophageal  cancer.He/She]  was advised that she could not drive home alone, if the patient chooses to receive sedation. Further diagnostic and treatment recommendations will be based upon the procedure and any biopsies, if they are taken.\par 2. She was advised to undergo colonoscopy to which she  agreed. The procedure will be performed in [facility] with the assistance of an anesthesiologist. The procedure was explained in detail and she understood the risks of the procedure not limited  to infection, bleeding, perforation, risk of anesthesia and risk of IV site problems,emergency surgery, missed lesions  or non-diagnosis of colon cancer. She  was advised that she could not drive home alone, if the patient chooses to receive sedation. Further diagnostic and treatment recommendations will be based upon the procedure and any biopsies, if they are taken.\par

## 2023-03-07 NOTE — PATIENT PROFILE ADULT - NSTRANSFERBELONGINGSRESP_GEN_A_NUR
Patient is requesting a medication refill, OARRS complete. Patient is 11 days out from surgery of Lt ring finger DIP joint fusion. Patient was last seen on 3/6 and patients next appointment is 4/3. Patient was last given Percocet [id number] PRN on 2/24. yes

## 2023-04-04 ENCOUNTER — APPOINTMENT (OUTPATIENT)
Dept: GASTROENTEROLOGY | Facility: AMBULATORY SURGERY CENTER | Age: 68
End: 2023-04-04

## 2023-06-02 ENCOUNTER — APPOINTMENT (OUTPATIENT)
Dept: GASTROENTEROLOGY | Facility: CLINIC | Age: 68
End: 2023-06-02

## 2023-06-16 NOTE — PROGRESS NOTE ADULT - PROBLEM SELECTOR PLAN 2
Pt admitted with hyperkalemia in the setting of CKD, Losartan, Finerinone, hyperglycemia and likely urine retention. Serum potassium elevated at 7.4 on 11/11. Pt received medical management. Continue to hold Losartan and Fineronone. Serum potassium WNL (4.7) today. Low K diet. Monitor serum potassium. Pt admitted with hyperkalemia in the setting of CKD, Losartan, Fineronone, hyperglycemia and likely urine retention. Serum potassium elevated at 7.4 on 11/11. Pt received medical management. Continue to hold Losartan and Fineronone. Serum potassium WNL (4.7) today. Low K diet. Monitor serum potassium. Additional Notes: PBX done with a 3mm punch and 4-0 prolene Render Risk Assessment In Note?: no Detail Level: Detailed

## 2023-06-29 ENCOUNTER — APPOINTMENT (OUTPATIENT)
Dept: GASTROENTEROLOGY | Facility: AMBULATORY SURGERY CENTER | Age: 68
End: 2023-06-29

## 2023-08-03 ENCOUNTER — APPOINTMENT (OUTPATIENT)
Dept: GASTROENTEROLOGY | Facility: CLINIC | Age: 68
End: 2023-08-03
Payer: COMMERCIAL

## 2023-08-03 VITALS
HEART RATE: 66 BPM | HEIGHT: 60 IN | WEIGHT: 114 LBS | OXYGEN SATURATION: 99 % | RESPIRATION RATE: 16 BRPM | DIASTOLIC BLOOD PRESSURE: 60 MMHG | TEMPERATURE: 97.8 F | BODY MASS INDEX: 22.38 KG/M2 | SYSTOLIC BLOOD PRESSURE: 110 MMHG

## 2023-08-03 DIAGNOSIS — Z12.11 ENCOUNTER FOR SCREENING FOR MALIGNANT NEOPLASM OF COLON: ICD-10-CM

## 2023-08-03 DIAGNOSIS — Z78.9 OTHER SPECIFIED HEALTH STATUS: ICD-10-CM

## 2023-08-03 PROCEDURE — 99214 OFFICE O/P EST MOD 30 MIN: CPT

## 2023-08-03 RX ORDER — POLYETHYLENE GLYCOL-3350 AND ELECTROLYTES WITH FLAVOR PACK 240; 5.84; 2.98; 6.72; 22.72 G/278.26G; G/278.26G; G/278.26G; G/278.26G; G/278.26G
240 POWDER, FOR SOLUTION ORAL
Qty: 1 | Refills: 0 | Status: ACTIVE | COMMUNITY
Start: 2023-08-03 | End: 1900-01-01

## 2023-08-03 NOTE — REASON FOR VISIT
[Follow-up] : a follow-up of an existing diagnosis [FreeTextEntry1] : colon cancer screening, iron deficiency

## 2023-08-03 NOTE — ASSESSMENT
[FreeTextEntry1] : 68-year-old female history of iron deficiency anemia and due for colorectal cancer screening.  There is no dyspepsia or GERD.  She is lost 3 pounds since beginning the year when she was hospitalized for hyponatremia and diarrhea.  There is no family history of colorectal cancer.  With her daughter as an  the patient denies any significant abdominal discomfort  IMP: 1. colon cancer screening 2. Iron deficiency anemia 3. Weight loss  PLAN: 1. She  was advised to undergo endoscopy to which she agreed. The procedure will be performed in [facility] with the assistance of an anesthesiologist. The procedure was explained in detail and she understood the risks of the procedure not limited  to infection, bleeding, perforation, risk of anesthesia and risk of IV site problems,emergency surgery, missed lesions  or non-diagnosis of stomach or esophageal  cancer.He/She]  was advised that she could not drive home alone, if the patient chooses to receive sedation. Further diagnostic and treatment recommendations will be based upon the procedure and any biopsies, if they are taken. 2. She was advised to undergo colonoscopy to which she  agreed. The procedure will be performed [facility] with the assistance of an anesthesiologist. The procedure was explained in detail and she understood the risks of the procedure not limited  to infection, bleeding, perforation, risk of anesthesia and risk of IV site problems,emergency surgery, missed lesions  or non-diagnosis of colon cancer. She  was advised that she could not drive home alone, if the patient chooses to receive sedation. Further diagnostic and treatment recommendations will be based upon the procedure and any biopsies, if they are taken.

## 2023-08-03 NOTE — HISTORY OF PRESENT ILLNESS
[FreeTextEntry1] : 68-year-old female history of iron deficiency anemia and due for colorectal cancer screening.  There is no dyspepsia or GERD.  She is lost 3 pounds since beginning the year when she was hospitalized for hyponatremia and diarrhea.  There is no family history of colorectal cancer.  With her daughter as an  the patient denies any significant abdominal discomfort

## 2023-08-03 NOTE — PHYSICAL EXAM
[Alert] : alert [Normal Voice/Communication] : normal voice/communication [Healthy Appearing] : healthy appearing [No Acute Distress] : no acute distress [Sclera] : the sclera and conjunctiva were normal [Hearing Threshold Finger Rub Not [place]] : hearing was normal [Normal Lips/Gums] : the lips and gums were normal [Normal Appearance] : the appearance of the neck was normal [Oropharynx] : the oropharynx was normal [No Neck Mass] : no neck mass was observed [No Respiratory Distress] : no respiratory distress [No Acc Muscle Use] : no accessory muscle use [Respiration, Rhythm And Depth] : normal respiratory rhythm and effort [Auscultation Breath Sounds / Voice Sounds] : lungs were clear to auscultation bilaterally [Heart Rate And Rhythm] : heart rate was normal and rhythm regular [Normal S1, S2] : normal S1 and S2 [Murmurs] : no murmurs [Bowel Sounds] : normal bowel sounds [Abdomen Tenderness] : non-tender [No Masses] : no abdominal mass palpated [Abdomen Soft] : soft [] : no hepatosplenomegaly [Oriented To Time, Place, And Person] : oriented to person, place, and time

## 2023-08-18 NOTE — PROGRESS NOTE ADULT - SUBJECTIVE AND OBJECTIVE BOX
[facility] DIVISION OF KIDNEY DISEASES AND HYPERTENSION -- FOLLOW UP NOTE  FAITH Fellow pager # [phone]  Nephrology office # [phone]  Available on [employer] teams--> Eduardo Byrd  -----------------------------------------------------------------------------  Chief Complaint:/subjective:    feels better       24 hour events:            ALLERGIES & MEDICATIONS  --------------------------------------------------------------------------------  Allergies    No Known Allergies    Intolerances      Standing Inpatient Medications  atenolol  Tablet 50 milliGRAM(s) Oral daily  chlorhexidine 2% Cloths 1 Application(s) Topical <User Schedule>  dextrose 5%. 1000 milliLiter(s) IV Continuous <Continuous>  dextrose 5%. 1000 milliLiter(s) IV Continuous <Continuous>  dextrose 50% Injectable 25 Gram(s) IV Push once  dextrose 50% Injectable 12.5 Gram(s) IV Push once  dextrose 50% Injectable 25 Gram(s) IV Push once  folic acid 1 milliGRAM(s) Oral daily  glucagon  Injectable 1 milliGRAM(s) IntraMuscular once  heparin   Injectable 5000 Unit(s) SubCutaneous every 8 hours  insulin lispro (ADMELOG) corrective regimen sliding scale   SubCutaneous three times a day before meals  insulin lispro (ADMELOG) corrective regimen sliding scale   SubCutaneous at bedtime  levothyroxine 25 MICROGram(s) Oral daily  melatonin 9 milliGRAM(s) Oral at bedtime  multivitamin 1 Tablet(s) Oral daily  mupirocin 2% Ointment 1 Application(s) Both Nostrils two times a day  rOPINIRole 1 milliGRAM(s) Oral at bedtime  simvastatin 40 milliGRAM(s) Oral at bedtime  sodium chloride 1 Gram(s) Oral three times a day    PRN Inpatient Medications  dextrose Oral Gel 15 Gram(s) Oral once PRN        VITALS/PHYSICAL EXAM  --------------------------------------------------------------------------------  T(C): 36.4 (11-17-22 @ 05:13), Max: 36.9 (11-16-22 @ 21:51)  HR: 77 (11-17-22 @ 05:13) (57 - 77)  BP: 139/67 (11-17-22 @ 05:13) (125/47 - 149/65)  RR: 16 (11-17-22 @ 05:13) (16 - 18)  SpO2: 100% (11-17-22 @ 05:13) (100% - 100%)  Wt(kg): --        Physical Exam:  	Gen NAD  	HEENT: no JVD  	Pulm: CTABL  	CV: S1S2,  	Abd: Soft,   	Ext:   - edema B/L LE   	Neuro: Awake and alert  	Skin: Warm and dry               LABS/STUDIES  --------------------------------------------------------------------------------              10.5   10.79 >-----------<  273      [11-17-22 @ 07:15]              31.5     Hemoglobin: 10.5 g/dL (11-17-22 @ 07:15)  Hemoglobin: 10.3 g/dL (11-16-22 @ 07:01)    Platelet Count - Automated: 273 K/uL (11-17-22 @ 07:15)  Platelet Count - Automated: 272 K/uL (11-16-22 @ 07:01)    132  |  99  |  28  ----------------------------<  192      [11-17-22 @ 07:15]  4.6   |  21  |  1.14        Ca     9.3     [11-17-22 @ 07:15]      Mg     2.00     [11-17-22 @ 07:15]      Phos  4.3     [11-17-22 @ 07:15]          Serum Osmolality 294      [11-16-22 @ 17:52]    Creatinine, Serum: 1.14 mg/dL (11-17-22 @ 07:15)  Creatinine, Serum: 1.20 mg/dL (11-16-22 @ 17:52)  Creatinine, Serum: 1.11 mg/dL (11-16-22 @ 07:01)  Creatinine, Serum: 1.16 mg/dL (11-15-22 @ 21:28)  Creatinine, Serum: 1.01 mg/dL (11-15-22 @ 12:15)  Creatinine, Serum: 1.06 mg/dL (11-15-22 @ 06:30)  Creatinine, Serum: 1.11 mg/dL (11-14-22 @ 23:00)  Creatinine, Serum: 1.15 mg/dL (11-14-22 @ 20:22)  Creatinine, Serum: 1.10 mg/dL (11-14-22 @ 13:35)  Creatinine, Serum: 1.09 mg/dL (11-14-22 @ 10:02)  Creatinine, Serum: 1.11 mg/dL (11-14-22 @ 05:19)  Creatinine, Serum: 1.02 mg/dL (11-13-22 @ 23:20)  Creatinine, Serum: 0.98 mg/dL (11-13-22 @ 18:00)  Creatinine, Serum: 1.06 mg/dL (11-13-22 @ 10:40)  Creatinine, Serum: 1.08 mg/dL (11-13-22 @ 05:30)  Creatinine, Serum: 1.06 mg/dL (11-13-22 @ 02:15)  Creatinine, Serum: 1.07 mg/dL (11-12-22 @ 22:15)  Creatinine, Serum: 1.12 mg/dL (11-12-22 @ 18:26)  Creatinine, Serum: 1.20 mg/dL (11-12-22 @ 14:15)  Creatinine, Serum: 1.31 mg/dL (11-12-22 @ 10:35)  Creatinine, Serum: 1.43 mg/dL (11-12-22 @ 06:20)  Creatinine, Serum: 1.45 mg/dL (11-12-22 @ 02:40)  Creatinine, Serum: 1.44 mg/dL (11-11-22 @ 22:31)  Creatinine, Serum: 1.42 mg/dL (11-11-22 @ 22:31)  Creatinine, Serum: 1.53 mg/dL (11-11-22 @ 20:30)    SODIUM TREND:  Sodium 132 [11-17 @ 07:15]  Sodium 128 [11-16 @ 17:52]  Sodium 126 [11-16 @ 07:01]  Sodium 126 [11-15 @ 21:28]  Sodium 126 [11-15 @ 12:15]  Sodium 125 [11-15 @ 06:30]  Sodium 124 [11-14 @ 23:00]  Sodium 124 [11-14 @ 20:22]  Sodium 127 [11-14 @ 13:35]  Sodium 125 [11-14 @ 10:02]        SCr 1.14 [11-17 @ 07:15]  SCr 1.20 [11-16 @ 17:52]  SCr 1.11 [11-16 @ 07:01]  SCr 1.16 [11-15 @ 21:28]  SCr 1.01 [11-15 @ 12:15]    Urinalysis - [11-14-22 @ 06:04]      Color Light Red / Appearance Slightly Turbid / SG 1.007 / pH 6.5      Gluc Negative / Ketone Negative  / Bili Negative / Urobili <2 mg/dL       Blood Large / Protein 30 mg/dL / Leuk Est Small / Nitrite Negative       / WBC 9 / Hyaline 1 / Gran  / Sq Epi  / Non Sq Epi 3 / Bacteria Negative    Urine Creatinine 11      [11-12-22 @ 02:45]  Urine Sodium 113      [11-15-22 @ 10:33]  Urine Urea Nitrogen 163.8      [11-12-22 @ 02:45]  Urine Potassium 8.8      [11-11-22 @ 23:40]  Urine Chloride 56      [11-11-22 @ 23:40]  Urine Osmolality 651      [11-15-22 @ 10:33]    Iron 68, TIBC 321, %sat 21      [11-17-22 @ 07:15]  TSH 13.13      [11-12-22 @ 02:40]  Lipid: chol --, , HDL --, LDL --      [11-13-22 @ 23:20]    HCV 0.06, Nonreact      [11-12-22 @ 02:40]       Cosentyx Pregnancy And Lactation Text: This medication is Pregnancy Category B and is considered safe during pregnancy. It is unknown if this medication is excreted in breast milk.

## 2023-08-31 NOTE — PRE-ANESTHESIA EVALUATION ADULT - NSANTHASARD_GEN_ALL_CORE
09/16/20 1544   Vent Information   PEEP/CPAP 8   CBG obtained results reported to Dr Thao Suárez. Peep increased to 8. 2

## 2023-08-31 NOTE — PRE-ANESTHESIA EVALUATION ADULT - NSANTHOSAYNRD_GEN_A_CORE
No. EMA screening performed.  STOP BANG Legend: 0-2 = LOW Risk; 3-4 = INTERMEDIATE Risk; 5-8 = HIGH Risk

## 2023-09-01 ENCOUNTER — RESULT REVIEW (OUTPATIENT)
Age: 68
End: 2023-09-01

## 2023-09-01 ENCOUNTER — NON-APPOINTMENT (OUTPATIENT)
Age: 68
End: 2023-09-01

## 2023-09-01 ENCOUNTER — TRANSCRIPTION ENCOUNTER (OUTPATIENT)
Age: 68
End: 2023-09-01

## 2023-09-01 ENCOUNTER — APPOINTMENT (OUTPATIENT)
Dept: GASTROENTEROLOGY | Facility: HOSPITAL | Age: 68
End: 2023-09-01
Payer: COMMERCIAL

## 2023-09-01 ENCOUNTER — OUTPATIENT (OUTPATIENT)
Dept: OUTPATIENT SERVICES | Facility: HOSPITAL | Age: 68
LOS: 1 days | End: 2023-09-01
Payer: MEDICAID

## 2023-09-01 VITALS
HEART RATE: 50 BPM | WEIGHT: 113.98 LBS | RESPIRATION RATE: 18 BRPM | OXYGEN SATURATION: 100 % | SYSTOLIC BLOOD PRESSURE: 179 MMHG | HEIGHT: 60 IN | TEMPERATURE: 97 F | DIASTOLIC BLOOD PRESSURE: 77 MMHG

## 2023-09-01 VITALS
HEART RATE: 55 BPM | RESPIRATION RATE: 18 BRPM | DIASTOLIC BLOOD PRESSURE: 70 MMHG | OXYGEN SATURATION: 98 % | SYSTOLIC BLOOD PRESSURE: 138 MMHG

## 2023-09-01 DIAGNOSIS — Z12.11 ENCOUNTER FOR SCREENING FOR MALIGNANT NEOPLASM OF COLON: ICD-10-CM

## 2023-09-01 DIAGNOSIS — Z86.2 PERSONAL HISTORY OF DISEASES OF THE BLOOD AND BLOOD-FORMING ORGANS AND CERTAIN DISORDERS INVOLVING THE IMMUNE MECHANISM: ICD-10-CM

## 2023-09-01 LAB — GLUCOSE BLDC GLUCOMTR-MCNC: 125 MG/DL — HIGH (ref 70–99)

## 2023-09-01 PROCEDURE — 88341 IMHCHEM/IMCYTCHM EA ADD ANTB: CPT

## 2023-09-01 PROCEDURE — 88341 IMHCHEM/IMCYTCHM EA ADD ANTB: CPT | Mod: 26

## 2023-09-01 PROCEDURE — 45385 COLONOSCOPY W/LESION REMOVAL: CPT | Mod: 33

## 2023-09-01 PROCEDURE — 88342 IMHCHEM/IMCYTCHM 1ST ANTB: CPT | Mod: 26

## 2023-09-01 PROCEDURE — 43239 EGD BIOPSY SINGLE/MULTIPLE: CPT

## 2023-09-01 PROCEDURE — 45378 DIAGNOSTIC COLONOSCOPY: CPT

## 2023-09-01 PROCEDURE — 82962 GLUCOSE BLOOD TEST: CPT

## 2023-09-01 PROCEDURE — 88305 TISSUE EXAM BY PATHOLOGIST: CPT

## 2023-09-01 PROCEDURE — 88305 TISSUE EXAM BY PATHOLOGIST: CPT | Mod: 26

## 2023-09-01 RX ORDER — CHLORHEXIDINE GLUCONATE 213 G/1000ML
15 SOLUTION TOPICAL
Qty: 0 | Refills: 0 | DISCHARGE

## 2023-09-01 RX ORDER — LORATADINE 10 MG/1
1 TABLET ORAL
Qty: 0 | Refills: 0 | DISCHARGE

## 2023-09-01 RX ORDER — PREGABALIN 225 MG/1
1 CAPSULE ORAL
Qty: 0 | Refills: 0 | DISCHARGE

## 2023-09-01 RX ORDER — SIMVASTATIN 20 MG/1
1 TABLET, FILM COATED ORAL
Qty: 0 | Refills: 0 | DISCHARGE

## 2023-09-01 RX ORDER — ATENOLOL 25 MG/1
1 TABLET ORAL
Qty: 0 | Refills: 0 | DISCHARGE

## 2023-09-01 RX ORDER — METFORMIN HYDROCHLORIDE 850 MG/1
1 TABLET ORAL
Qty: 0 | Refills: 0 | DISCHARGE

## 2023-09-01 RX ORDER — ERGOCALCIFEROL 1.25 MG/1
1 CAPSULE ORAL
Qty: 0 | Refills: 0 | DISCHARGE

## 2023-09-01 RX ORDER — FOLIC ACID 0.8 MG
1 TABLET ORAL
Qty: 0 | Refills: 0 | DISCHARGE

## 2023-09-01 RX ORDER — MULTIVIT-MIN/FERROUS GLUCONATE 9 MG/15 ML
1 LIQUID (ML) ORAL
Qty: 0 | Refills: 0 | DISCHARGE

## 2023-09-01 RX ORDER — LEVOTHYROXINE SODIUM 125 MCG
1 TABLET ORAL
Qty: 0 | Refills: 0 | DISCHARGE

## 2023-09-01 NOTE — ASU DISCHARGE PLAN (ADULT/PEDIATRIC) - NS MD DC FALL RISK RISK
For information on Fall & Injury Prevention, visit: [email or web address] OR  [email or web address] OR  https://www.cdc.gov/steadi/patient.html

## 2023-09-01 NOTE — ASU DISCHARGE PLAN (ADULT/PEDIATRIC) - CARE PROVIDER_API CALL
Mehrdad Grier  Gastroenterology  [address]  [place] [address]  Phone: [phone]  Fax: [phone]  Follow Up Time: 1 month

## 2023-09-01 NOTE — ASU PATIENT PROFILE, ADULT - FALL HARM RISK - UNIVERSAL INTERVENTIONS
49
Bed in lowest position, wheels locked, appropriate side rails in place/Call bell, personal items and telephone in reach/Instruct patient to call for assistance before getting out of bed or chair/Non-slip footwear when patient is out of bed/[place] to call system/Physically safe environment - no spills, clutter or unnecessary equipment/Purposeful Proactive Rounding/Room/bathroom lighting operational, light cord in reach

## 2023-09-07 PROBLEM — E78.5 HYPERLIPIDEMIA, UNSPECIFIED: Chronic | Status: ACTIVE | Noted: 2023-09-01

## 2023-09-07 PROBLEM — E11.9 TYPE 2 DIABETES MELLITUS WITHOUT COMPLICATIONS: Chronic | Status: ACTIVE | Noted: 2023-09-01

## 2023-09-07 PROBLEM — I10 ESSENTIAL (PRIMARY) HYPERTENSION: Chronic | Status: ACTIVE | Noted: 2023-09-01

## 2023-09-07 PROBLEM — E03.9 HYPOTHYROIDISM, UNSPECIFIED: Chronic | Status: ACTIVE | Noted: 2023-09-01

## 2023-09-08 LAB — SURGICAL PATHOLOGY STUDY: SIGNIFICANT CHANGE UP

## 2023-09-11 ENCOUNTER — TRANSCRIPTION ENCOUNTER (OUTPATIENT)
Age: 68
End: 2023-09-11

## 2023-09-11 RX ORDER — OMEPRAZOLE 40 MG/1
40 CAPSULE, DELAYED RELEASE ORAL
Qty: 14 | Refills: 0 | Status: ACTIVE | COMMUNITY
Start: 2023-09-11 | End: 1900-01-01

## 2023-09-11 RX ORDER — CLARITHROMYCIN 500 MG/1
500 TABLET, FILM COATED ORAL TWICE DAILY
Qty: 28 | Refills: 0 | Status: ACTIVE | COMMUNITY
Start: 2023-09-11 | End: 1900-01-01

## 2023-09-11 RX ORDER — AMOXICILLIN 500 MG/1
500 TABLET, FILM COATED ORAL
Qty: 56 | Refills: 0 | Status: ACTIVE | COMMUNITY
Start: 2023-09-11 | End: 1900-01-01

## 2023-10-13 ENCOUNTER — APPOINTMENT (OUTPATIENT)
Dept: GASTROENTEROLOGY | Facility: CLINIC | Age: 68
End: 2023-10-13
Payer: COMMERCIAL

## 2023-10-13 VITALS
WEIGHT: 113 LBS | OXYGEN SATURATION: 99 % | HEIGHT: 60 IN | HEART RATE: 62 BPM | SYSTOLIC BLOOD PRESSURE: 128 MMHG | BODY MASS INDEX: 22.19 KG/M2 | TEMPERATURE: 97 F | RESPIRATION RATE: 14 BRPM | DIASTOLIC BLOOD PRESSURE: 70 MMHG

## 2023-10-13 DIAGNOSIS — R63.4 ABNORMAL WEIGHT LOSS: ICD-10-CM

## 2023-10-13 DIAGNOSIS — K29.70 GASTRITIS, UNSPECIFIED, W/OUT BLEEDING: ICD-10-CM

## 2023-10-13 DIAGNOSIS — D50.9 IRON DEFICIENCY ANEMIA, UNSPECIFIED: ICD-10-CM

## 2023-10-13 DIAGNOSIS — R19.8 OTHER SPECIFIED SYMPTOMS AND SIGNS INVOLVING THE DIGESTIVE SYSTEM AND ABDOMEN: ICD-10-CM

## 2023-10-13 DIAGNOSIS — B96.81 GASTRITIS, UNSPECIFIED, W/OUT BLEEDING: ICD-10-CM

## 2023-10-13 PROCEDURE — 83014 H PYLORI DRUG ADMIN: CPT

## 2023-10-13 PROCEDURE — 99214 OFFICE O/P EST MOD 30 MIN: CPT | Mod: 25

## 2023-10-14 ENCOUNTER — TRANSCRIPTION ENCOUNTER (OUTPATIENT)
Age: 68
End: 2023-10-14

## 2023-10-14 LAB — UREA BREATH TEST QL: NEGATIVE

## 2024-03-15 NOTE — PATIENT PROFILE ADULT - IS PATIENT POST-MENOPAUSAL?
Continuity of Care Form    Patient Name: Debbie Cotter   :  1996  MRN:  [id number]    Admit date:  3/14/2024  Discharge date:  ***    Code Status Order: Prior   Advance Directives:     Admitting Physician:  No admitting provider for patient encounter.  PCP: Kyara Sanderson DO    Discharging Nurse: ***  Discharging Hospital Unit/Room#:   Discharging Unit Phone Number: ***    Emergency Contact:   Extended Emergency Contact Information  Primary Emergency Contact: Georgie Cotter  Address: [address] [place] [address]  Home Phone: [phone]  Relation: Parent    Past Surgical History:  Past Surgical History:   Procedure Laterality Date     SECTION N/A 10/24/2022     SECTION performed by Qing Bond MD at [facility] L&D OR       Immunization History:   Immunization History   Administered Date(s) Administered    TDaP, ADACEL (age 10y-64y), BOOSTRIX (age 10y+), IM, 0.5mL 11/15/2017       Active Problems:  Patient Active Problem List   Diagnosis Code    Immunization due Z23    Term pregnancy Z34.90     delivery delivered O82    39 weeks gestation of pregnancy Z3A.39    Footling breech presentation O32.8XX0       Isolation/Infection:   Isolation            No Isolation          Patient Infection Status       None to display            Nurse Assessment:  Last Vital Signs: /65   Pulse 100   Temp 98.1 °F (36.7 °C) (Oral)   Resp 18   Ht 1.524 m (5')   Wt 52.2 kg (115 lb)   SpO2 99%   BMI 22.46 kg/m²     Last documented pain score (0-10 scale): Pain Level: 5  Last Weight:   Wt Readings from Last 1 Encounters:   24 52.2 kg (115 lb)     Mental Status:  {IP PT MENTAL STATUS:56984}    IV Access:  { WILL IV ACCESS:248324972}    Nursing Mobility/ADLs:  Walking   {CHP DME ADLs:440779719}  Transfer  {CHP DME ADLs:215801901}  Bathing  {CHP DME ADLs:245473552}  Dressing  {CHP DME ADLs:227852849}  Toileting  {CHP DME  yes

## 2024-07-23 NOTE — DISCHARGE NOTE PROVIDER - NSDCHHATTENDCERT_GEN_ALL_CORE
How Severe Is It?: mild Is This A New Presentation, Or A Follow-Up?: Sweating Sweating Severity Scale: 3- The sweating is barely tolerable and frequently interferes with daily activities My signature below certifies that the above stated patient is homebound and upon completion of the Face-To-Face encounter, has the need for intermittent skilled nursing, physical therapy and/or speech or occupational therapy services in their home for their current diagnosis as outlined in their initial plan of care. These services will continue to be monitored by myself or another physician.

## 2024-08-15 NOTE — PROGRESS NOTE ADULT - ASSESSMENT
ASSESSMENT  67y female PMHx HTN, HLD, DM2, iron deficiency anemia (s/p iron transfusion yesterday) presenting with generalized weakness, dizziness and lower extremity heaviness admitted to the MICU for electrolyte disturbances (Na 112, K 7.4, Cl 83).     PLAN  =====Neurologic=====  Patient is AOx3 at baseline    #Seizure?  Unsure if seizure activity, patient had witnessed arm and eyelid twitching with unresponsiveness for 1-2 minutes. Potentially 2/2 to electrolyte abnormalities  - No EEG needed at this time  - Neuro checks q4h  - Correct electrolytes    =====Pulmonary=====  Patient breathing comfortably on room air. No active issues. Reported SOB resolved, will monitor.     =====Cardiovascular=====  Patient does not currently require vasopressors and is normotensive. No active issues    =====GI=====  #GI Prophylaxis  - Protonix 40mg IV QD    #Diet  - Advance as tolerated    #Diarrhea  Largely resolved  - Monitor  - Consider stool studies if worsening of diarrhea    =====Renal/=====  #Electrolytes  - Maintain K>4, Phos>3, Mag>2, iCal>1    #Hyponatremia (Na 112, 11/12)  Likely 2/2 decreased PO intact, volume loss from diarrhea and worsening SIADH?  - Hypertonic saline (150 mL) given in ED   - Do not correct Na too fast  - BMP q4h  - Lao for urine output, strict monitor I/Os   - Nephrology consulted, pending recs  - Will sent TSH, cortisol  - Will send urine osmolality and urine lytes    #Hyperkalemia (K 7.4 11/12)  Likely contributed by MAGUE and CKD. Possible side effect of new med started 9/2022 Kerendia. No EKG changes.   - In ED: Insulin, calcium gluconate, albuterol and furosemide given (K improved to 6.4)  - Nephrology consulted, pending recs  - In AM, obtain patient records for baseline kidney function from PCP  - Hold Kerendia and losartan   - BMP q4h     #MAGUE/CKD  Presented with BUN 42 and Cr 1.53, BUN/Cr ratio 27. Likely 2/2 decreased PO intake and fluid loss from diarrhea.  - Will obtain records in AM for baseline BUN and Cr    =====Endocrine=====  #DM2  - While NPO, FSBG and KARISHMA q6h  - Will obtain A1C    #Hypothyroidism  - C/w current dose of synthroid  - Will obtain TSH and T4      =====Infectious Disease=====  Patient is afebrile. Patient has leukocytosis, unlikely from infectious etiology. Likely 2/2 inflammation and recent diarrhea.     =====Heme/Onc=====  #DVT Prophylaxis  - Heparin 5000 subq     #Iron deficiency anemia  - Obtain outpatient records in AM from PCP    =====Ethics=====  FULL CODE  Speaks Czech  Addended by: HAZEL RALPH on: 8/15/2024 08:59 AM     Modules accepted: Orders     ASSESSMENT  67y female PMHx HTN, HLD, DM2, iron deficiency anemia (s/p iron transfusion yesterday) presenting with generalized weakness, dizziness and lower extremity heaviness admitted to the MICU for electrolyte disturbances (Na 112, K 7.4, Cl 83).     PLAN  =====Neurologic=====  Patient is AOx3 at baseline    #Seizure?  Unsure if seizure activity, patient had witnessed arm and eyelid twitching with unresponsiveness for 1-2 minutes. Potentially 2/2 to electrolyte abnormalities  - No EEG needed at this time  - Neuro checks q4h  - Correct electrolytes    =====Pulmonary=====  Patient breathing comfortably on room air. No active issues. Reported SOB resolved, will monitor.     =====Cardiovascular=====  Patient does not currently require vasopressors and is normotensive. No active issues    =====GI=====  #GI Prophylaxis  - Protonix 40mg IV QD    #Diet  - Advance as tolerated    #Diarrhea  Largely resolved  - Monitor  - Consider stool studies if worsening of diarrhea    =====Renal/=====  #Electrolytes  - Maintain K>4, Phos>3, Mag>2, iCal>1    #Hyponatremia (Na 112, 11/12)  Likely 2/2 decreased PO intact, volume loss from diarrhea and worsening SIADH?  - Hypertonic saline (150 mL) given in ED   - Do not correct Na too fast, goal Na 119 today,   -C/w D5 to prevent overcorrection  - BMP q4h  - Lao for urine output, strict monitor I/Os   - Nephrology consulted, pending recs  - Will sent TSH, cortisol  - Will send urine osmolality and urine lytes    #Hyperkalemia (K 7.4 11/12)  Likely contributed by MAGUE and CKD. Possible side effect of new med started 9/2022 Kerendia. No EKG changes.   - In ED: Insulin, calcium gluconate, albuterol and furosemide given (K improved to 6.4)  - Nephrology consulted, pending recs  - In AM, obtain patient records for baseline kidney function from PCP  - Hold Kerendia and losartan   - BMP q4h     #MAGUE/CKD  Presented with BUN 42 and Cr 1.53, BUN/Cr ratio 27. Likely 2/2 decreased PO intake and fluid loss from diarrhea.  - Will obtain records in AM for baseline BUN and Cr    =====Endocrine=====  #DM2  - While NPO, FSBG and KARISHMA q6h  - Will obtain A1C    #Hypothyroidism  - C/w current dose of synthroid  - Will obtain TSH and T4      =====Infectious Disease=====  Patient is afebrile. Patient has leukocytosis, unlikely from infectious etiology. Likely 2/2 inflammation and recent diarrhea.     =====Heme/Onc=====  #DVT Prophylaxis  - Heparin 5000 subq     #Iron deficiency anemia  - Obtain outpatient records in AM from PCP    =====Ethics=====  FULL CODE  Speaks Telugu

## 2024-09-23 NOTE — PHYSICAL THERAPY INITIAL EVALUATION ADULT - WEIGHT-BEARING RESTRICTIONS: STAND/SIT, REHAB EVAL
full weight-bearing [FreeTextEntry1] : Spirometry demonstrates airway obstruction with positive bronchodilator response NIOX 10

## 2025-03-14 ENCOUNTER — APPOINTMENT (OUTPATIENT)
Dept: GASTROENTEROLOGY | Facility: CLINIC | Age: 70
End: 2025-03-14

## 2025-08-19 ENCOUNTER — NON-APPOINTMENT (OUTPATIENT)
Age: 70
End: 2025-08-19

## 2025-08-20 ENCOUNTER — APPOINTMENT (OUTPATIENT)
Dept: GASTROENTEROLOGY | Facility: CLINIC | Age: 70
End: 2025-08-20
Payer: COMMERCIAL

## 2025-08-20 VITALS
BODY MASS INDEX: 20.81 KG/M2 | HEART RATE: 55 BPM | SYSTOLIC BLOOD PRESSURE: 116 MMHG | WEIGHT: 106 LBS | OXYGEN SATURATION: 98 % | RESPIRATION RATE: 14 BRPM | HEIGHT: 60 IN | DIASTOLIC BLOOD PRESSURE: 60 MMHG

## 2025-08-20 DIAGNOSIS — D50.9 IRON DEFICIENCY ANEMIA, UNSPECIFIED: ICD-10-CM

## 2025-08-20 DIAGNOSIS — K29.70 GASTRITIS, UNSPECIFIED, W/OUT BLEEDING: ICD-10-CM

## 2025-08-20 DIAGNOSIS — R19.8 OTHER SPECIFIED SYMPTOMS AND SIGNS INVOLVING THE DIGESTIVE SYSTEM AND ABDOMEN: ICD-10-CM

## 2025-08-20 DIAGNOSIS — B96.81 GASTRITIS, UNSPECIFIED, W/OUT BLEEDING: ICD-10-CM

## 2025-08-20 DIAGNOSIS — R63.4 ABNORMAL WEIGHT LOSS: ICD-10-CM

## 2025-08-20 PROCEDURE — 99214 OFFICE O/P EST MOD 30 MIN: CPT

## 2025-08-20 RX ORDER — ATORVASTATIN CALCIUM 80 MG/1
TABLET, FILM COATED ORAL
Refills: 0 | Status: ACTIVE | COMMUNITY

## 2025-08-20 RX ORDER — ESOMEPRAZOLE MAGNESIUM 40 MG/1
40 CAPSULE, DELAYED RELEASE ORAL
Qty: 90 | Refills: 1 | Status: ACTIVE | COMMUNITY
Start: 2025-08-20 | End: 1900-01-01

## 2025-08-20 RX ORDER — EMPAGLIFLOZIN 25 MG/1
TABLET, FILM COATED ORAL
Refills: 0 | Status: ACTIVE | COMMUNITY

## 2025-08-20 RX ORDER — LEVOTHYROXINE SODIUM 137 UG/1
TABLET ORAL
Refills: 0 | Status: ACTIVE | COMMUNITY

## 2025-08-21 ENCOUNTER — TRANSCRIPTION ENCOUNTER (OUTPATIENT)
Age: 70
End: 2025-08-21

## 2025-08-21 LAB — UREA BREATH TEST QL: NEGATIVE

## 2025-09-11 ENCOUNTER — APPOINTMENT (OUTPATIENT)
Dept: GASTROENTEROLOGY | Facility: CLINIC | Age: 70
End: 2025-09-11
Payer: COMMERCIAL

## 2025-09-11 PROCEDURE — 91110 GI TRC IMG INTRAL ESOPH-ILE: CPT

## 2025-09-14 ENCOUNTER — EMERGENCY (EMERGENCY)
Facility: HOSPITAL | Age: 70
LOS: 1 days | End: 2025-09-14
Attending: EMERGENCY MEDICINE | Admitting: EMERGENCY MEDICINE
Payer: MEDICAID

## 2025-09-14 VITALS
RESPIRATION RATE: 18 BRPM | HEART RATE: 64 BPM | TEMPERATURE: 98 F | DIASTOLIC BLOOD PRESSURE: 80 MMHG | SYSTOLIC BLOOD PRESSURE: 147 MMHG | OXYGEN SATURATION: 98 %

## 2025-09-14 LAB
ALBUMIN SERPL ELPH-MCNC: 4.1 G/DL — SIGNIFICANT CHANGE UP (ref 3.3–5)
ALP SERPL-CCNC: 84 U/L — SIGNIFICANT CHANGE UP (ref 40–120)
ALT FLD-CCNC: 51 U/L — HIGH (ref 4–33)
ANION GAP SERPL CALC-SCNC: 14 MMOL/L — SIGNIFICANT CHANGE UP (ref 7–14)
APTT BLD: 35.3 SEC — SIGNIFICANT CHANGE UP (ref 26.1–36.8)
AST SERPL-CCNC: 43 U/L — HIGH (ref 4–32)
BASOPHILS # BLD AUTO: 0.05 K/UL — SIGNIFICANT CHANGE UP (ref 0–0.2)
BASOPHILS NFR BLD AUTO: 0.5 % — SIGNIFICANT CHANGE UP (ref 0–2)
BILIRUB SERPL-MCNC: 0.5 MG/DL — SIGNIFICANT CHANGE UP (ref 0.2–1.2)
BUN SERPL-MCNC: 28 MG/DL — HIGH (ref 7–23)
CALCIUM SERPL-MCNC: 9.2 MG/DL — SIGNIFICANT CHANGE UP (ref 8.4–10.5)
CHLORIDE SERPL-SCNC: 102 MMOL/L — SIGNIFICANT CHANGE UP (ref 98–107)
CO2 SERPL-SCNC: 20 MMOL/L — LOW (ref 22–31)
CREAT SERPL-MCNC: 1.25 MG/DL — SIGNIFICANT CHANGE UP (ref 0.5–1.3)
EGFR: 46 ML/MIN/1.73M2 — LOW
EGFR: 46 ML/MIN/1.73M2 — LOW
EOSINOPHIL # BLD AUTO: 0.04 K/UL — SIGNIFICANT CHANGE UP (ref 0–0.5)
EOSINOPHIL NFR BLD AUTO: 0.4 % — SIGNIFICANT CHANGE UP (ref 0–6)
GLUCOSE SERPL-MCNC: 158 MG/DL — HIGH (ref 70–99)
HCT VFR BLD CALC: 38.9 % — SIGNIFICANT CHANGE UP (ref 34.5–45)
HGB BLD-MCNC: 13.1 G/DL — SIGNIFICANT CHANGE UP (ref 11.5–15.5)
IMM GRANULOCYTES # BLD AUTO: 0.05 K/UL — SIGNIFICANT CHANGE UP (ref 0–0.07)
IMM GRANULOCYTES NFR BLD AUTO: 0.5 % — SIGNIFICANT CHANGE UP (ref 0–0.9)
INR BLD: 0.94 RATIO — SIGNIFICANT CHANGE UP (ref 0.85–1.16)
LYMPHOCYTES # BLD AUTO: 2.42 K/UL — SIGNIFICANT CHANGE UP (ref 1–3.3)
LYMPHOCYTES NFR BLD AUTO: 22.2 % — SIGNIFICANT CHANGE UP (ref 13–44)
MCHC RBC-ENTMCNC: 29 PG — SIGNIFICANT CHANGE UP (ref 27–34)
MCHC RBC-ENTMCNC: 33.7 G/DL — SIGNIFICANT CHANGE UP (ref 32–36)
MCV RBC AUTO: 86.3 FL — SIGNIFICANT CHANGE UP (ref 80–100)
MONOCYTES # BLD AUTO: 0.81 K/UL — SIGNIFICANT CHANGE UP (ref 0–0.9)
MONOCYTES NFR BLD AUTO: 7.4 % — SIGNIFICANT CHANGE UP (ref 2–14)
NEUTROPHILS # BLD AUTO: 7.51 K/UL — HIGH (ref 1.8–7.4)
NEUTROPHILS NFR BLD AUTO: 69 % — SIGNIFICANT CHANGE UP (ref 43–77)
NRBC # BLD AUTO: 0 K/UL — SIGNIFICANT CHANGE UP (ref 0–0)
NRBC # FLD: 0 K/UL — SIGNIFICANT CHANGE UP (ref 0–0)
NRBC BLD AUTO-RTO: 0 /100 WBCS — SIGNIFICANT CHANGE UP (ref 0–0)
PLATELET # BLD AUTO: 145 K/UL — LOW (ref 150–400)
PMV BLD: 12.5 FL — SIGNIFICANT CHANGE UP (ref 7–13)
POTASSIUM SERPL-MCNC: 4.6 MMOL/L — SIGNIFICANT CHANGE UP (ref 3.5–5.3)
POTASSIUM SERPL-SCNC: 4.6 MMOL/L — SIGNIFICANT CHANGE UP (ref 3.5–5.3)
PROT SERPL-MCNC: 7.4 G/DL — SIGNIFICANT CHANGE UP (ref 6–8.3)
PROTHROM AB SERPL-ACNC: 10.9 SEC — SIGNIFICANT CHANGE UP (ref 9.9–13.4)
RBC # BLD: 4.51 M/UL — SIGNIFICANT CHANGE UP (ref 3.8–5.2)
RBC # FLD: 14.1 % — SIGNIFICANT CHANGE UP (ref 10.3–14.5)
SODIUM SERPL-SCNC: 136 MMOL/L — SIGNIFICANT CHANGE UP (ref 135–145)
TROPONIN T, HIGH SENSITIVITY RESULT: <6 NG/L — SIGNIFICANT CHANGE UP
WBC # BLD: 10.88 K/UL — HIGH (ref 3.8–10.5)
WBC # FLD AUTO: 10.88 K/UL — HIGH (ref 3.8–10.5)

## 2025-09-14 PROCEDURE — 93010 ELECTROCARDIOGRAM REPORT: CPT

## 2025-09-14 PROCEDURE — 72131 CT LUMBAR SPINE W/O DYE: CPT | Mod: 26

## 2025-09-14 PROCEDURE — 99284 EMERGENCY DEPT VISIT MOD MDM: CPT

## 2025-09-14 PROCEDURE — 72125 CT NECK SPINE W/O DYE: CPT | Mod: 26

## 2025-09-14 PROCEDURE — 72128 CT CHEST SPINE W/O DYE: CPT | Mod: 26

## 2025-09-14 PROCEDURE — 70450 CT HEAD/BRAIN W/O DYE: CPT | Mod: 26

## 2025-09-14 RX ORDER — ACETAMINOPHEN 500 MG/5ML
1000 LIQUID (ML) ORAL ONCE
Refills: 0 | Status: COMPLETED | OUTPATIENT
Start: 2025-09-14 | End: 2025-09-15

## 2025-09-15 VITALS
DIASTOLIC BLOOD PRESSURE: 78 MMHG | OXYGEN SATURATION: 98 % | SYSTOLIC BLOOD PRESSURE: 140 MMHG | TEMPERATURE: 98 F | RESPIRATION RATE: 17 BRPM | HEART RATE: 60 BPM

## 2025-09-15 RX ADMIN — Medication 400 MILLIGRAM(S): at 00:45

## (undated) DEVICE — BIOPSY FORCEP RADIAL JAW 4 STANDARD WITH NEEDLE

## (undated) DEVICE — FOLEY HOLDER STATLOCK 2 WAY ADULT

## (undated) DEVICE — CATH IV SAFE BC 20G X 1.16" (PINK)

## (undated) DEVICE — TUBING IV SET GRAVITY 3Y 100" MACRO

## (undated) DEVICE — TUBING SUCTION 20FT

## (undated) DEVICE — SYR ALLIANCE II INFLATION 60ML

## (undated) DEVICE — SYR LUER LOK 50CC

## (undated) DEVICE — PACK IV START WITH CHG

## (undated) DEVICE — BITE BLOCK ADULT 20 X 27MM (GREEN)

## (undated) DEVICE — BALLOON US ENDO

## (undated) DEVICE — FORCEP RADIAL JAW 4 JUMBO 2.8MM 3.2MM 240CM ORANGE DISP

## (undated) DEVICE — BRUSH COLONOSCOPY CYTOLOGY

## (undated) DEVICE — TUBING SUCTION CONN 6FT STERILE

## (undated) DEVICE — SUCTION YANKAUER NO CONTROL VENT

## (undated) DEVICE — CATH IV SAFE BC 22G X 1" (BLUE)

## (undated) DEVICE — SENSOR O2 FINGER ADULT

## (undated) DEVICE — IRRIGATOR BIO SHIELD

## (undated) DEVICE — ELCTR GROUNDING PAD ADULT COVIDIEN

## (undated) DEVICE — POLY TRAP ETRAP

## (undated) DEVICE — SOL INJ NS 0.9% 500ML 2 PORT

## (undated) DEVICE — CLAMP BX HOT RAD JAW 3